# Patient Record
Sex: FEMALE | Race: WHITE | NOT HISPANIC OR LATINO | Employment: FULL TIME | URBAN - METROPOLITAN AREA
[De-identification: names, ages, dates, MRNs, and addresses within clinical notes are randomized per-mention and may not be internally consistent; named-entity substitution may affect disease eponyms.]

---

## 2017-05-23 ENCOUNTER — TELEPHONE (OUTPATIENT)
Dept: BARIATRICS | Facility: CLINIC | Age: 37
End: 2017-05-23

## 2017-05-23 NOTE — TELEPHONE ENCOUNTER
----- Message from Mago George sent at 5/22/2017 12:37 PM CDT -----  Contact: self   Patient wants to speak with a nurse regarding her insurance over lap band please call back at [phone]

## 2018-02-01 RX ORDER — DESOGESTREL AND ETHINYL ESTRADIOL 0.15-0.03
KIT ORAL
Refills: 8 | COMMUNITY
Start: 2018-01-15 | End: 2018-05-29

## 2018-02-01 RX ORDER — PHENTERMINE HYDROCHLORIDE 37.5 MG/1
CAPSULE ORAL
Refills: 0 | COMMUNITY
Start: 2018-01-16 | End: 2018-04-22

## 2018-02-07 RX ORDER — PHENTERMINE HYDROCHLORIDE 37.5 MG/1
37.5 CAPSULE ORAL EVERY MORNING
Qty: 30 CAPSULE | Refills: 0 | OUTPATIENT
Start: 2018-02-07 | End: 2019-02-07

## 2018-02-07 NOTE — TELEPHONE ENCOUNTER
----- Message from Kaylin Richards sent at 2/1/2018  8:41 AM CST -----  Contact: Mirlande Pike needs a refill of phentermine capsules sent to [facility] on [facility]. She is also requesting the results of her sleep study and the info on where to  the equipment.   Pt can be contacted at [phone]

## 2018-03-13 ENCOUNTER — TELEPHONE (OUTPATIENT)
Dept: INTERNAL MEDICINE | Facility: CLINIC | Age: 38
End: 2018-03-13

## 2018-04-22 ENCOUNTER — HOSPITAL ENCOUNTER (EMERGENCY)
Facility: HOSPITAL | Age: 38
Discharge: HOME | End: 2018-04-22
Attending: EMERGENCY MEDICINE
Payer: COMMERCIAL

## 2018-04-22 VITALS
HEIGHT: 63 IN | TEMPERATURE: 98 F | SYSTOLIC BLOOD PRESSURE: 144 MMHG | DIASTOLIC BLOOD PRESSURE: 85 MMHG | OXYGEN SATURATION: 91 % | WEIGHT: 293 LBS | HEART RATE: 71 BPM | BODY MASS INDEX: 51.91 KG/M2 | RESPIRATION RATE: 20 BRPM

## 2018-04-22 DIAGNOSIS — R51.9 HEADACHE: ICD-10-CM

## 2018-04-22 DIAGNOSIS — I10 ACCELERATED HYPERTENSION: Primary | ICD-10-CM

## 2018-04-22 LAB
ANION GAP SERPL CALC-SCNC: 9 MMOL/L
B-HCG UR QL: NEGATIVE
BASOPHILS # BLD AUTO: 0 K/UL
BASOPHILS NFR BLD: 0.1 %
BUN SERPL-MCNC: 10 MG/DL
CALCIUM SERPL-MCNC: 10 MG/DL
CHLORIDE SERPL-SCNC: 103 MMOL/L
CO2 SERPL-SCNC: 26 MMOL/L
CREAT SERPL-MCNC: 0.9 MG/DL
CTP QC/QA: YES
DIFFERENTIAL METHOD: ABNORMAL
EOSINOPHIL # BLD AUTO: 0.2 K/UL
EOSINOPHIL NFR BLD: 1.6 %
ERYTHROCYTE [DISTWIDTH] IN BLOOD BY AUTOMATED COUNT: 18.6 %
EST. GFR  (AFRICAN AMERICAN): >60 ML/MIN/1.73 M^2
EST. GFR  (NON AFRICAN AMERICAN): >60 ML/MIN/1.73 M^2
GLUCOSE SERPL-MCNC: 103 MG/DL
HCT VFR BLD AUTO: 36.3 %
HGB BLD-MCNC: 11.3 G/DL
LYMPHOCYTES # BLD AUTO: 2.7 K/UL
LYMPHOCYTES NFR BLD: 25 %
MCH RBC QN AUTO: 22.6 PG
MCHC RBC AUTO-ENTMCNC: 31.2 G/DL
MCV RBC AUTO: 72 FL
MONOCYTES # BLD AUTO: 0.7 K/UL
MONOCYTES NFR BLD: 6.7 %
NEUTROPHILS # BLD AUTO: 7.3 K/UL
NEUTROPHILS NFR BLD: 66.6 %
PLATELET # BLD AUTO: 366 K/UL
PMV BLD AUTO: 9.5 FL
POTASSIUM SERPL-SCNC: 4.1 MMOL/L
RBC # BLD AUTO: 5.02 M/UL
SODIUM SERPL-SCNC: 138 MMOL/L
WBC # BLD AUTO: 10.9 K/UL

## 2018-04-22 PROCEDURE — 85025 COMPLETE CBC W/AUTO DIFF WBC: CPT

## 2018-04-22 PROCEDURE — 63600175 PHARM REV CODE 636 W HCPCS: Performed by: EMERGENCY MEDICINE

## 2018-04-22 PROCEDURE — 36415 COLL VENOUS BLD VENIPUNCTURE: CPT

## 2018-04-22 PROCEDURE — 80048 BASIC METABOLIC PNL TOTAL CA: CPT

## 2018-04-22 PROCEDURE — 96374 THER/PROPH/DIAG INJ IV PUSH: CPT

## 2018-04-22 PROCEDURE — 96375 TX/PRO/DX INJ NEW DRUG ADDON: CPT

## 2018-04-22 PROCEDURE — 81025 URINE PREGNANCY TEST: CPT | Performed by: EMERGENCY MEDICINE

## 2018-04-22 PROCEDURE — 99284 EMERGENCY DEPT VISIT MOD MDM: CPT | Mod: 25

## 2018-04-22 PROCEDURE — 25000003 PHARM REV CODE 250: Performed by: EMERGENCY MEDICINE

## 2018-04-22 RX ORDER — KETOROLAC TROMETHAMINE 30 MG/ML
30 INJECTION, SOLUTION INTRAMUSCULAR; INTRAVENOUS
Status: COMPLETED | OUTPATIENT
Start: 2018-04-22 | End: 2018-04-22

## 2018-04-22 RX ORDER — ATENOLOL 25 MG/1
25 TABLET ORAL DAILY
Qty: 30 TABLET | Refills: 0 | Status: SHIPPED | OUTPATIENT
Start: 2018-04-22 | End: 2019-03-25

## 2018-04-22 RX ORDER — METOCLOPRAMIDE HYDROCHLORIDE 5 MG/ML
10 INJECTION INTRAMUSCULAR; INTRAVENOUS
Status: COMPLETED | OUTPATIENT
Start: 2018-04-22 | End: 2018-04-22

## 2018-04-22 RX ORDER — BUTALBITAL, ACETAMINOPHEN AND CAFFEINE 50; 325; 40 MG/1; MG/1; MG/1
1 TABLET ORAL EVERY 4 HOURS PRN
Qty: 15 TABLET | Refills: 0 | Status: SHIPPED | OUTPATIENT
Start: 2018-04-22 | End: 2018-05-22

## 2018-04-22 RX ORDER — LABETALOL HYDROCHLORIDE 5 MG/ML
20 INJECTION, SOLUTION INTRAVENOUS
Status: COMPLETED | OUTPATIENT
Start: 2018-04-22 | End: 2018-04-22

## 2018-04-22 RX ADMIN — KETOROLAC TROMETHAMINE 30 MG: 30 INJECTION, SOLUTION INTRAMUSCULAR at 06:04

## 2018-04-22 RX ADMIN — LABETALOL HYDROCHLORIDE 20 MG: 5 INJECTION, SOLUTION INTRAVENOUS at 06:04

## 2018-04-22 RX ADMIN — METOCLOPRAMIDE 10 MG: 5 INJECTION, SOLUTION INTRAMUSCULAR; INTRAVENOUS at 06:04

## 2018-04-22 NOTE — ED PROVIDER NOTES
Encounter Date: 2018    SCRIBE #1 NOTE: [place], am scribing for, and in the presence of, Dr. Saldaña.       History     Chief Complaint   Patient presents with    Headache     pt referred to ED from urgent care with headache since friday and HTN    Hypertension       2018 5:55 PM     Chief complaint: Headache      Mirlande Boogie is a 37 y.o. female with a past medical history of hypothyroidism, HTN, and anemia presenting to the ED with a gradual onset of an acute headache beginning 2 days ago. The pt reported current were similar to sinus congestion. She stated she initially went to urgent care for symptoms and was advised to come to ED for evaluation of kidney complications. Associated symptoms of nausea, photophobia, and eye pain. The pt denied fever, vomiting, and neck stiffness. The pt reported she has previously taken diet pills but has discontinued 1 month ago. The pt denied history of smoking.        The history is provided by the patient.     Review of patient's allergies indicates:   Allergen Reactions    Anesthesia s/i-40 [propofol]     Codeine      Past Medical History:   Diagnosis Date    Anemia     Hypothyroidism      Past Surgical History:   Procedure Laterality Date    BREAST SURGERY       SECTION       Family History   Problem Relation Age of Onset    Pulmonary fibrosis Mother      Social History   Substance Use Topics    Smoking status: Never Smoker    Smokeless tobacco: Never Used    Alcohol use No     Review of Systems   Constitutional: Negative for activity change, appetite change, chills, fatigue and fever.   Eyes: Positive for photophobia and pain. Negative for visual disturbance.   Respiratory: Negative for apnea and shortness of breath.    Cardiovascular: Negative for chest pain and palpitations.   Gastrointestinal: Positive for nausea. Negative for abdominal distention, abdominal pain and vomiting.   Genitourinary: Negative for difficulty urinating.    Musculoskeletal: Negative for neck stiffness.   Skin: Negative for pallor and rash.   Neurological: Positive for headaches.   Hematological: Does not bruise/bleed easily.   Psychiatric/Behavioral: Negative for agitation.       Physical Exam     Initial Vitals [04/22/18 1625]   BP Pulse Resp Temp SpO2   (!) 175/104 91 (!) 99 98.4 °F (36.9 °C) 98 %      MAP       127.67         Physical Exam    Nursing note and vitals reviewed.  Constitutional: She appears well-developed and well-nourished.   HENT:   Head: Normocephalic and atraumatic.   Eyes: Conjunctivae and EOM are normal. Pupils are equal, round, and reactive to light.   Neck: Normal range of motion. Neck supple.   Cardiovascular: Normal rate, regular rhythm and normal heart sounds. Exam reveals no gallop and no friction rub.    No murmur heard.  Pulmonary/Chest: Effort normal and breath sounds normal. No respiratory distress. She has no wheezes. She has no rhonchi. She has no rales.   Abdominal: Soft. She exhibits no distension. There is no tenderness.   Musculoskeletal: Normal range of motion.   Neurological: She is alert and oriented to person, place, and time. She has normal strength.   Skin: Skin is warm and dry. No erythema.   Psychiatric: She has a normal mood and affect.         ED Course   Procedures  Labs Reviewed - No data to display          Medical Decision Making:   History:   Old Medical Records: I decided to obtain old medical records.  Clinical Tests:   Lab Tests: Ordered and Reviewed  Radiological Study: Ordered and Reviewed  ED Management:  Mirlande Boogie is a 37 y.o. female who presents with  a global headache.  She denies any history of hypertension but is currently hypertensive.  She is given labetalol with resolution of her hypertension and headache.  Headache most likely is secondary to accelerated hypertension.  CT of the head is normal with no evidence of intracranial hemorrhage.    Imaging Results          CT Head Without Contrast (In  process)                         Scribe Attestation:   Scribe #1: I performed the above scribed service and the documentation accurately describes the services I performed. I attest to the accuracy of the note.    I, Dr. Amilcar Saldaña III, personally performed the services described in this documentation. All medical record entries made by the scribe were at my direction and in my presence.  I have reviewed the chart and agree that the record reflects my personal performance and is accurate and complete. Amilcar Saldaña III, MD.  7:32 PM 04/22/2018           Clinical Impression:   The primary encounter diagnosis was Accelerated hypertension. A diagnosis of Headache was also pertinent to this visit.                           Amilcar Saldaña III, MD  04/22/18 1933

## 2018-05-06 PROBLEM — F32.9 DEPRESSION, REACTIVE: Status: ACTIVE | Noted: 2018-05-06

## 2018-05-06 PROBLEM — E78.2 MIXED HYPERLIPIDEMIA: Status: ACTIVE | Noted: 2018-05-06

## 2018-05-06 PROBLEM — E66.01 MORBID OBESITY: Status: ACTIVE | Noted: 2018-05-06

## 2018-05-24 ENCOUNTER — TELEPHONE (OUTPATIENT)
Dept: INTERNAL MEDICINE | Facility: CLINIC | Age: 38
End: 2018-05-24

## 2018-05-24 NOTE — TELEPHONE ENCOUNTER
----- Message from Kaylin Richards sent at 5/24/2018  1:38 PM CDT -----  Contact: Mirlande Pike is trying to find out about a cpap machine. Please call her

## 2018-05-24 NOTE — TELEPHONE ENCOUNTER
SLEEP HOME STUDY  ASKING ABOUT A C-PAP IT IS IN MEDIA  ON 1/3/18 HAS AN APPT. WITH YOLANDA TO GO OVER SLEEP STUDY

## 2018-05-29 ENCOUNTER — OFFICE VISIT (OUTPATIENT)
Dept: INTERNAL MEDICINE | Facility: CLINIC | Age: 38
End: 2018-05-29
Payer: COMMERCIAL

## 2018-05-29 VITALS
WEIGHT: 293 LBS | BODY MASS INDEX: 51.91 KG/M2 | HEART RATE: 111 BPM | HEIGHT: 63 IN | RESPIRATION RATE: 19 BRPM | DIASTOLIC BLOOD PRESSURE: 96 MMHG | TEMPERATURE: 98 F | OXYGEN SATURATION: 99 % | SYSTOLIC BLOOD PRESSURE: 136 MMHG

## 2018-05-29 DIAGNOSIS — G47.33 OSA (OBSTRUCTIVE SLEEP APNEA): Primary | ICD-10-CM

## 2018-05-29 DIAGNOSIS — Z23 NEED FOR TDAP VACCINATION: ICD-10-CM

## 2018-05-29 DIAGNOSIS — E66.01 MORBID OBESITY: ICD-10-CM

## 2018-05-29 DIAGNOSIS — R03.0 ELEVATED BP WITHOUT DIAGNOSIS OF HYPERTENSION: ICD-10-CM

## 2018-05-29 PROCEDURE — 3080F DIAST BP >= 90 MM HG: CPT | Mod: ,,, | Performed by: NURSE PRACTITIONER

## 2018-05-29 PROCEDURE — 3075F SYST BP GE 130 - 139MM HG: CPT | Mod: ,,, | Performed by: NURSE PRACTITIONER

## 2018-05-29 PROCEDURE — 3008F BODY MASS INDEX DOCD: CPT | Mod: ,,, | Performed by: NURSE PRACTITIONER

## 2018-05-29 PROCEDURE — 99214 OFFICE O/P EST MOD 30 MIN: CPT | Mod: ,,, | Performed by: NURSE PRACTITIONER

## 2018-05-29 RX ORDER — AMLODIPINE BESYLATE 5 MG/1
5 TABLET ORAL DAILY
Qty: 30 TABLET | Refills: 5 | Status: SHIPPED | OUTPATIENT
Start: 2018-05-29 | End: 2019-04-10 | Stop reason: SDUPTHER

## 2018-05-29 RX ORDER — PHENTERMINE HYDROCHLORIDE 37.5 MG/1
37.5 CAPSULE ORAL EVERY MORNING
Qty: 30 CAPSULE | Refills: 0 | Status: CANCELLED | OUTPATIENT
Start: 2018-05-29 | End: 2018-06-28

## 2018-05-29 NOTE — PROGRESS NOTES
"    SUBJECTIVE:      Patient ID: Mirlande Boogie is a 37 y.o. female.    Chief Complaint: Follow-up (adipex 37.5 capsule was working well, has regained weight); Apnea (review sleep study); and Hypertension (has been prescribed atenolol 25, taking about 1xweek when BP elevated)    Last visit 11/2017, no labs on file since 4/2016, has had trouble making it in to see us, [facility] ED for uncontrolled HTN 4/22, states she was still feeling bad in May when she no-showed here    States since being off adipex has put "all the weight I lost back on", states she was down to 315 last year, feels her periods have been affecting her weight & BP, notices HA when her BP is elevated, admits to not taking atenolol regularly as it makes her sleepy, just started med after 4/22 ED visit, denies HO HTN, thinks she retains a lot more water than an average person during her periods, was told to take OTC diuretic for "swelling" which has helped some, BP elevated today - states she ate [employer] sandwich for lunch & was running late    C/o not hearing back re: sleep study, needs order for CPAP, diagnosed with ASAEL in January, wants to get her machine as she thinks not sleeping has contributed to her weight gain, discussed outstanding health maintenance & needed labs, talked about proper diet & exercise for weight loss         Hypertension   This is a new problem. The current episode started more than 1 month ago. The problem has been gradually improving since onset. Associated symptoms include headaches. Pertinent negatives include no anxiety, blurred vision, chest pain, malaise/fatigue, neck pain, orthopnea, palpitations, peripheral edema, PND, shortness of breath or sweats. There are no associated agents to hypertension. Risk factors for coronary artery disease include obesity, stress and sedentary lifestyle. Past treatments include beta blockers. The current treatment provides mild improvement. Compliance problems include exercise, " diet and medication side effects.  Identifiable causes of hypertension include sleep apnea.       Past Surgical History:   Procedure Laterality Date    BREAST SURGERY       SECTION      WISDOM TOOTH EXTRACTION      all 4 removed     Family History   Problem Relation Age of Onset    Pulmonary fibrosis Mother     Early death Father 64      Social History     Social History    Marital status: Single     Spouse name: N/A    Number of children: N/A    Years of education: N/A     Social History Main Topics    Smoking status: Never Smoker    Smokeless tobacco: Never Used    Alcohol use No    Drug use: No    Sexual activity: No     Other Topics Concern    None     Social History Narrative    None     Current Outpatient Prescriptions   Medication Sig Dispense Refill    atenolol (TENORMIN) 25 MG tablet Take 1 tablet (25 mg total) by mouth once daily. 30 tablet 0    pamabrom 50 mg Tab Take 1 tablet by mouth daily as needed.      amLODIPine (NORVASC) 5 MG tablet Take 1 tablet (5 mg total) by mouth once daily. 30 tablet 5     No current facility-administered medications for this visit.      Review of patient's allergies indicates:   Allergen Reactions    Anesthesia s/i-40 [propofol]     Codeine       Past Medical History:   Diagnosis Date    Anemia     Back pain     CHO intolerance     Excessive daytime sleepiness     Hypothyroidism     Hypothyroidism     Ventral hernia      Past Surgical History:   Procedure Laterality Date    BREAST SURGERY       SECTION      WISDOM TOOTH EXTRACTION      all 4 removed       Review of Systems   Constitutional: Negative for activity change, appetite change, fatigue, malaise/fatigue and unexpected weight change.   HENT: Negative for congestion, ear pain, hearing loss, postnasal drip, sinus pain, sinus pressure, sneezing and sore throat.    Eyes: Negative for blurred vision, photophobia and pain.   Respiratory: Negative for cough, chest tightness,  "shortness of breath and wheezing.    Cardiovascular: Negative for chest pain, palpitations, orthopnea, leg swelling and PND.   Gastrointestinal: Positive for abdominal distention ("bloating") and nausea. Negative for abdominal pain, blood in stool, constipation, diarrhea and vomiting.   Endocrine: Negative for cold intolerance, heat intolerance, polydipsia and polyuria.   Genitourinary: Negative for difficulty urinating, dysuria, flank pain, frequency, hematuria, pelvic pain and urgency.   Musculoskeletal: Negative for arthralgias, back pain, joint swelling, myalgias and neck pain.   Skin: Negative for pallor and rash.   Allergic/Immunologic: Negative for environmental allergies and food allergies.   Neurological: Positive for headaches. Negative for dizziness, weakness, light-headedness and numbness.   Hematological: Does not bruise/bleed easily.   Psychiatric/Behavioral: Negative for agitation, confusion, decreased concentration and sleep disturbance. The patient is not nervous/anxious.       OBJECTIVE:      Vitals:    05/29/18 1359 05/29/18 1407 05/29/18 1457   BP: (!) 146/98 (!) 140/96 (!) 136/96   Pulse: (!) 111     Resp: 19     Temp: 98.2 °F (36.8 °C)     SpO2: 99%     Weight: (!) 160.8 kg (354 lb 9.6 oz)     Height: 5' 3" (1.6 m)       Physical Exam   Constitutional: She is oriented to person, place, and time. She appears well-developed and well-nourished. No distress.   Morbidly obese   HENT:   Head: Normocephalic and atraumatic.   Right Ear: Hearing normal.   Left Ear: Hearing normal.   Nose: Nose normal. No rhinorrhea.   Mouth/Throat: Mucous membranes are normal.   Eyes: Conjunctivae and lids are normal. Pupils are equal, round, and reactive to light. Right eye exhibits no discharge. Left eye exhibits no discharge. Right conjunctiva is not injected. Left conjunctiva is not injected. Right pupil is round and reactive. Left pupil is round and reactive. Pupils are equal.   Neck: Trachea normal and normal " range of motion. Neck supple. No JVD present. No tracheal deviation present. No thyromegaly present.   Cardiovascular: Normal rate, regular rhythm, normal heart sounds and intact distal pulses.  Exam reveals no gallop and no friction rub.    No murmur heard.  Pulses:       Radial pulses are 2+ on the right side, and 2+ on the left side.   Pulmonary/Chest: Effort normal and breath sounds normal. No stridor. No respiratory distress. She has no decreased breath sounds. She has no wheezes. She has no rhonchi. She has no rales.   Abdominal: Soft. Bowel sounds are normal. She exhibits no distension. There is no tenderness. There is no rigidity and no guarding.   Musculoskeletal: Normal range of motion. She exhibits no edema.   Lymphadenopathy:     She has no cervical adenopathy.   Neurological: She is alert and oriented to person, place, and time. She has normal strength. She displays no atrophy. She displays a negative Romberg sign. Coordination and gait normal.   Skin: Skin is warm and dry. Capillary refill takes less than 2 seconds. No lesion and no rash noted. No cyanosis. No pallor.   Psychiatric: She has a normal mood and affect. Her speech is normal and behavior is normal. Judgment and thought content normal. Cognition and memory are normal. She is attentive.   Nursing note and vitals reviewed.     Assessment:       1. ASAEL (obstructive sleep apnea)    2. Elevated BP without diagnosis of hypertension    3. Morbid obesity    4. Need for Tdap vaccination        Plan:       ASAEL (obstructive sleep apnea)  -     CPAP titration (Must have diagnosis of ASAEL from previous sleep study.); Future    Elevated BP without diagnosis of hypertension  -     amLODIPine (NORVASC) 5 MG tablet; Take 1 tablet (5 mg total) by mouth once daily.  Dispense: 30 tablet; Refill: 5  -     Lipid panel; Future; Expected date: 05/29/2018    Morbid obesity   -The patient is asked to make an attempt to improve diet and exercise patterns to aid in  medical management of this problem.  -     phentermine 37.5 MG capsule; Take 1 capsule (37.5 mg total) by mouth every morning.  Dispense: 30 capsule; Refill: 0 (prescribed by Dr. Rainey)    Need for Tdap vaccination  -     DIPH,PERTUSS,ACEL,,TET VAC,PF, ADULT (ADACEL) 2 Lf-(2.5-5-3-5 mcg)-5Lf/0.5 mL Syrg; Inject 0.5 mLs into the muscle once.  Dispense: 0.5 mL; Refill: 0      Follow-up in about 4 weeks (around 6/26/2018) for weight & BP check.      5/31/2018 VICKIE Bruno, FNP-C

## 2018-05-29 NOTE — PATIENT INSTRUCTIONS
"  Understanding Body Mass Index (BMI)  Body mass index (BMI) is a method of screening for a weight category using the ratio of your height to your weight. The BMI is a measure of overweight that is corrected for height. Knowing your BMI is a way to tell if you are at a healthy weight. The higher your BMI, the greater your risk for weight-related health problems.  What BMI means  · BMI below 18.5: Underweight  · BMI 18.5 to 24.9: Healthy weight or "ideal body weight"   · BMI 25 to 29.9: Overweight  · BMI 30 and over: Obese  · BMI 40 and over: Severe obesity   Online BMI Calculators  Find your BMI with an online BMI calculator tool, such as these from the CDC:  · BMI calculator for adults  · BMI calculator for children and teens   Using the BMI chart  To figure out your BMI, find your height and weight (or the numbers closest to them) on the table below. Follow each column of numbers to where your height and weight meet on the table. That is your BMI.    Date Last Reviewed: 7/1/2016 © 2000-2017 [employer]. [address] [place] [address]. All rights reserved. This information is not intended as a substitute for professional medical care. Always follow your healthcare professional's instructions.      ++++Almased weight loss powder - can use as meal replacement breakfast & lunch (follow directions on can). Garcinia cambogia 95% pure 1 capsule in the AM. Mother's vinegar ([employer] [place]) 1 tsp in 8 oz warm water every AM.++++    "

## 2018-05-31 ENCOUNTER — TELEPHONE (OUTPATIENT)
Dept: INTERNAL MEDICINE | Facility: CLINIC | Age: 38
End: 2018-05-31

## 2018-05-31 DIAGNOSIS — R63.5 WEIGHT GAIN: Primary | ICD-10-CM

## 2018-05-31 NOTE — TELEPHONE ENCOUNTER
Patient stated she is waiting for a doctor to sign off on her phentemine 37.5mg I told the patient is would send to the pcp and let her know.

## 2018-06-04 DIAGNOSIS — E66.01 MORBID OBESITY: Primary | ICD-10-CM

## 2018-06-04 RX ORDER — PHENTERMINE HYDROCHLORIDE 37.5 MG/1
37.5 CAPSULE ORAL EVERY MORNING
Qty: 30 CAPSULE | Refills: 0 | Status: SHIPPED | OUTPATIENT
Start: 2018-06-04 | End: 2018-07-04

## 2018-06-04 RX ORDER — PHENTERMINE HYDROCHLORIDE 37.5 MG/1
37.5 CAPSULE ORAL EVERY MORNING
Qty: 30 CAPSULE | Refills: 0 | Status: CANCELLED | OUTPATIENT
Start: 2018-06-04 | End: 2018-07-04

## 2019-03-25 ENCOUNTER — OFFICE VISIT (OUTPATIENT)
Dept: FAMILY MEDICINE | Facility: CLINIC | Age: 39
End: 2019-03-25
Payer: COMMERCIAL

## 2019-03-25 VITALS
WEIGHT: 293 LBS | OXYGEN SATURATION: 98 % | BODY MASS INDEX: 51.91 KG/M2 | SYSTOLIC BLOOD PRESSURE: 138 MMHG | HEART RATE: 92 BPM | DIASTOLIC BLOOD PRESSURE: 98 MMHG | TEMPERATURE: 99 F | HEIGHT: 63 IN

## 2019-03-25 DIAGNOSIS — G47.33 OBSTRUCTIVE SLEEP APNEA: ICD-10-CM

## 2019-03-25 DIAGNOSIS — E66.01 MORBID OBESITY: ICD-10-CM

## 2019-03-25 DIAGNOSIS — R03.0 ELEVATED BP WITHOUT DIAGNOSIS OF HYPERTENSION: Primary | ICD-10-CM

## 2019-03-25 DIAGNOSIS — E78.2 MIXED HYPERLIPIDEMIA: ICD-10-CM

## 2019-03-25 PROCEDURE — 99214 PR OFFICE/OUTPT VISIT, EST, LEVL IV, 30-39 MIN: ICD-10-PCS | Mod: ,,, | Performed by: NURSE PRACTITIONER

## 2019-03-25 PROCEDURE — 3075F SYST BP GE 130 - 139MM HG: CPT | Mod: ,,, | Performed by: NURSE PRACTITIONER

## 2019-03-25 PROCEDURE — 3080F DIAST BP >= 90 MM HG: CPT | Mod: ,,, | Performed by: NURSE PRACTITIONER

## 2019-03-25 PROCEDURE — 3008F BODY MASS INDEX DOCD: CPT | Mod: ,,, | Performed by: NURSE PRACTITIONER

## 2019-03-25 PROCEDURE — 3075F PR MOST RECENT SYSTOLIC BLOOD PRESS GE 130-139MM HG: ICD-10-PCS | Mod: ,,, | Performed by: NURSE PRACTITIONER

## 2019-03-25 PROCEDURE — 99214 OFFICE O/P EST MOD 30 MIN: CPT | Mod: ,,, | Performed by: NURSE PRACTITIONER

## 2019-03-25 PROCEDURE — 3080F PR MOST RECENT DIASTOLIC BLOOD PRESSURE >= 90 MM HG: ICD-10-PCS | Mod: ,,, | Performed by: NURSE PRACTITIONER

## 2019-03-25 PROCEDURE — 3008F PR BODY MASS INDEX (BMI) DOCUMENTED: ICD-10-PCS | Mod: ,,, | Performed by: NURSE PRACTITIONER

## 2019-03-25 RX ORDER — PHENTERMINE HYDROCHLORIDE 37.5 MG/1
37.5 CAPSULE ORAL DAILY
Refills: 0 | COMMUNITY
Start: 2018-12-21 | End: 2019-04-10 | Stop reason: SDUPTHER

## 2019-03-25 NOTE — PATIENT INSTRUCTIONS
4 Steps for Eating Healthier  Changing the way you eat can improve your health. It can lower your cholesterol and blood pressure, and help you stay at a healthy weight. Your diet doesnt have to be bland and boring to be healthy. Just watch your calories and follow these steps:    1. Eat fewer unhealthy fats  · Choose more fish and lean meats instead of fatty cuts of meat.  · Skip butter and lard, and use less margarine.  · Pass on foods that have palm, coconut, or hydrogenated oils.  · Eat fewer high-fat dairy foods like cheese, ice cream, and whole milk.  · Get a heart-healthy cookbook and try some low-fat recipes.  2. Go light on salt  · Keep the saltshaker off the table.  · Limit high-salt ingredients, such as soy sauce, bouillon, and garlic salt.  · Instead of adding salt when cooking, season your food with herbs and flavorings. Try lemon, garlic, and onion.  · Limit convenience foods, such as boxed or canned foods and restaurant food.  · Read food labels and choose lower-sodium options.  3. Limit sugar  · Pause before you add sugars to pancakes, cereal, coffee, or tea. This includes white and brown table sugar, syrup, honey, and molasses. Cut your usual amount by half.  · Use non-sugar sweeteners. Stevia, aspartame, and sucralose can satisfy a sweet tooth without adding calories.  · Swap out sugar-filled soda and other drinks. Buy sugar-free or low-calorie beverages. Remember water is always the best choice.  · Read labels and choose foods with less added sugar. Keep in mind that dairy foods and foods with fruit will have some natural sugar.  · Cut the sugar in recipes by 1/3 to 1/2. Boost the flavor with extracts like almond, vanilla, or orange. Or add spices such as cinnamon or nutmeg.  4. Eat more fiber  · Eat fresh fruits and vegetables every day.  · Boost your diet with whole grains. Go for oats, whole-grain rice, and bran.  · Add beans and lentils to your meals.  · Drink more water to match your fiber  increase. This is to help prevent constipation.  Date Last Reviewed: 5/11/2015  © 6856-9387 [employer]. [address] [place] [address], [place] [address]. All rights reserved. This information is not intended as a substitute for professional medical care. Always follow your healthcare professional's instructions.

## 2019-03-25 NOTE — PROGRESS NOTES
"    SUBJECTIVE:      Patient ID: Mirlande Boogie is a 38 y.o. female.    Chief Complaint: No chief complaint on file.    Last seen in this office 2018 - didn't have labs done from that visit, here for phentermine refills, states she is seeing Dr. Nathan (bariatric surgery), was told she would have difficulty getting insurance to approve lap band or balloon as she doesn't have any "diagnosed" health issues, doesn't want to be cut on for sleeve or bypass, asking for pictures to be taken of her abd area for insurance approval, told her that request should be discussed with surgeon, she also states she had EGD last Thursday, states her ASAEL is caused by her tongue relaxing to the back of the throat, was unable to afford CPAP machine but is interested in getting it now although she states she is not happy about having a mask on her face  Hypertension   This is a chronic problem. The current episode started more than 1 month ago. The problem is unchanged. Pertinent negatives include no chest pain, neck pain, palpitations or shortness of breath. Agents associated with hypertension include amphetamines. Risk factors for coronary artery disease include dyslipidemia, obesity and sedentary lifestyle. Past treatments include calcium channel blockers. Compliance problems include diet and exercise.  Identifiable causes of hypertension include sleep apnea.       Past Surgical History:   Procedure Laterality Date    BREAST SURGERY       SECTION      WISDOM TOOTH EXTRACTION      all 4 removed     Family History   Problem Relation Age of Onset    Pulmonary fibrosis Mother     Early death Father 64      Social History     Socioeconomic History    Marital status:      Spouse name: None    Number of children: None    Years of education: None    Highest education level: None   Occupational History    None   Social Needs    Financial resource strain: None    Food insecurity:     Worry: None     Inability: None "    Transportation needs:     Medical: None     Non-medical: None   Tobacco Use    Smoking status: Never Smoker    Smokeless tobacco: Never Used   Substance and Sexual Activity    Alcohol use: No    Drug use: No    Sexual activity: Never   Lifestyle    Physical activity:     Days per week: None     Minutes per session: None    Stress: None   Relationships    Social connections:     Talks on phone: None     Gets together: None     Attends [employer] service: None     Active member of club or organization: None     Attends meetings of clubs or organizations: None     Relationship status: None    Intimate partner violence:     Fear of current or ex partner: None     Emotionally abused: None     Physically abused: None     Forced sexual activity: None   Other Topics Concern    None   Social History Narrative    None     Current Outpatient Medications   Medication Sig Dispense Refill    amLODIPine (NORVASC) 5 MG tablet Take 1 tablet (5 mg total) by mouth once daily. 30 tablet 5    phentermine 37.5 MG capsule Take 37.5 mg by mouth once daily.  0     No current facility-administered medications for this visit.      Review of patient's allergies indicates:   Allergen Reactions    Anesthesia s/i-40 [propofol]     Codeine       Past Medical History:   Diagnosis Date    Anemia     Back pain     CHO intolerance     Excessive daytime sleepiness     Hypothyroidism     Hypothyroidism     Obstructive sleep apnea 2019    Ventral hernia      Past Surgical History:   Procedure Laterality Date    BREAST SURGERY       SECTION      WISDOM TOOTH EXTRACTION      all 4 removed       Review of Systems   Constitutional: Negative for activity change, appetite change, fatigue and unexpected weight change.   HENT: Negative for congestion, ear pain, hearing loss, postnasal drip, sinus pressure, sinus pain, sneezing and sore throat.    Eyes: Negative for photophobia and pain.   Respiratory: Negative for cough,  "chest tightness, shortness of breath and wheezing.    Cardiovascular: Negative for chest pain, palpitations and leg swelling.   Gastrointestinal: Negative for abdominal distention, abdominal pain, blood in stool, constipation, diarrhea, nausea and vomiting.   Endocrine: Negative for cold intolerance, heat intolerance, polydipsia and polyuria.   Genitourinary: Negative for difficulty urinating, dysuria, flank pain, frequency, hematuria, pelvic pain and urgency.   Musculoskeletal: Negative for arthralgias, back pain, joint swelling, myalgias and neck pain.   Skin: Negative for pallor.   Allergic/Immunologic: Negative for environmental allergies and food allergies.   Neurological: Negative for dizziness, weakness, light-headedness and numbness.   Hematological: Does not bruise/bleed easily.   Psychiatric/Behavioral: Negative for agitation, confusion, decreased concentration and sleep disturbance. The patient is not nervous/anxious.       OBJECTIVE:      Vitals:    03/25/19 0822 03/25/19 0828   BP: (!) 140/104 (!) 138/98   Pulse: 92    Temp: 98.6 °F (37 °C)    SpO2: 98%    Weight: (!) 163.4 kg (360 lb 3.2 oz)    Height: 5' 3" (1.6 m)      Physical Exam   Constitutional: She is oriented to person, place, and time. She appears well-developed and well-nourished. No distress.   Morbidly obese   HENT:   Head: Normocephalic and atraumatic.   Right Ear: Hearing normal.   Left Ear: Hearing normal.   Nose: Nose normal. No rhinorrhea.   Mouth/Throat: Mucous membranes are normal.   Eyes: Pupils are equal, round, and reactive to light. Conjunctivae and lids are normal. Right eye exhibits no discharge. Left eye exhibits no discharge. Right conjunctiva is not injected. Left conjunctiva is not injected. Right pupil is round and reactive. Left pupil is round and reactive. Pupils are equal.   Neck: Trachea normal and normal range of motion. Neck supple. No JVD present. No tracheal deviation present. No thyromegaly present. "   Cardiovascular: Normal rate, regular rhythm, normal heart sounds and intact distal pulses. Exam reveals no gallop and no friction rub.   No murmur heard.  Pulses:       Radial pulses are 2+ on the right side, and 2+ on the left side.   Pulmonary/Chest: Effort normal and breath sounds normal. No stridor. No respiratory distress. She has no decreased breath sounds. She has no wheezes. She has no rhonchi. She has no rales.   Abdominal: Soft. Bowel sounds are normal. She exhibits no distension. There is no tenderness. There is no rigidity and no guarding.   Musculoskeletal: Normal range of motion. She exhibits no edema.   Lymphadenopathy:     She has no cervical adenopathy.   Neurological: She is alert and oriented to person, place, and time. She has normal strength. She displays no atrophy. She displays a negative Romberg sign. Coordination and gait normal.   Skin: Skin is warm and dry. Capillary refill takes less than 2 seconds. No lesion and no rash noted. No cyanosis. No pallor.   Psychiatric: She has a normal mood and affect. Her speech is normal and behavior is normal. Judgment and thought content normal. Cognition and memory are normal. She is attentive.   Nursing note and vitals reviewed.     Assessment:       1. Elevated BP without diagnosis of hypertension    2. Mixed hyperlipidemia    3. Morbid obesity    4. Obstructive sleep apnea        Plan:       Mixed hyperlipidemia  -     Lipid panel; Future; Expected date: 03/25/2019    Elevated BP without diagnosis of hypertension  -     CBC auto differential; Future; Expected date: 03/25/2019  -     Comprehensive metabolic panel; Future; Expected date: 03/25/2019    Morbid obesity   - The patient is asked to make an attempt to improve diet and exercise patterns to aid in medical management of this problem.    Obstructive sleep apnea   - interested in getting a CPAP now        Follow up in about 2 weeks (around 4/8/2019) for lab review & weight loss refills with   Redd.      3/25/2019 VICKIE Bruno, FNP-C

## 2019-03-29 ENCOUNTER — TELEPHONE (OUTPATIENT)
Dept: FAMILY MEDICINE | Facility: CLINIC | Age: 39
End: 2019-03-29

## 2019-03-29 DIAGNOSIS — G47.33 OBSTRUCTIVE SLEEP APNEA: Primary | ICD-10-CM

## 2019-03-29 NOTE — TELEPHONE ENCOUNTER
Leia with home sleep study delivery: pt ready to do sleep study, current order over a year old. Needs new order & progress note.

## 2019-04-01 NOTE — TELEPHONE ENCOUNTER
Paperwork given to CHANDA Wynn LPN for [facility]. Will be faxed with last progress note & demographics page. Please notify patient of same.

## 2019-04-04 LAB
ALBUMIN SERPL-MCNC: 3.7 G/DL (ref 3.6–5.1)
ALBUMIN/GLOB SERPL: 1 (CALC) (ref 1–2.5)
ALP SERPL-CCNC: 88 U/L (ref 33–115)
ALT SERPL-CCNC: 9 U/L (ref 6–29)
AST SERPL-CCNC: 16 U/L (ref 10–30)
BASOPHILS # BLD AUTO: 74 CELLS/UL (ref 0–200)
BASOPHILS NFR BLD AUTO: 0.7 %
BILIRUB SERPL-MCNC: 0.5 MG/DL (ref 0.2–1.2)
BUN SERPL-MCNC: 11 MG/DL (ref 7–25)
BUN/CREAT SERPL: NORMAL (CALC) (ref 6–22)
CALCIUM SERPL-MCNC: 9 MG/DL (ref 8.6–10.2)
CHLORIDE SERPL-SCNC: 102 MMOL/L (ref 98–110)
CHOLEST SERPL-MCNC: 198 MG/DL
CHOLEST/HDLC SERPL: 4.7 (CALC)
CO2 SERPL-SCNC: 30 MMOL/L (ref 20–32)
CREAT SERPL-MCNC: 0.76 MG/DL (ref 0.5–1.1)
EOSINOPHIL # BLD AUTO: 223 CELLS/UL (ref 15–500)
EOSINOPHIL NFR BLD AUTO: 2.1 %
ERYTHROCYTE [DISTWIDTH] IN BLOOD BY AUTOMATED COUNT: 20.3 % (ref 11–15)
GFRSERPLBLD MDRD-ARVRAT: 100 ML/MIN/1.73M2
GLOBULIN SER CALC-MCNC: 3.6 G/DL (CALC) (ref 1.9–3.7)
GLUCOSE SERPL-MCNC: 93 MG/DL (ref 65–99)
HCT VFR BLD AUTO: 35.2 % (ref 35–45)
HDLC SERPL-MCNC: 42 MG/DL
HGB BLD-MCNC: 10.1 G/DL (ref 11.7–15.5)
LDLC SERPL CALC-MCNC: 132 MG/DL (CALC)
LYMPHOCYTES # BLD AUTO: 3678 CELLS/UL (ref 850–3900)
LYMPHOCYTES NFR BLD AUTO: 34.7 %
MCH RBC QN AUTO: 20 PG (ref 27–33)
MCHC RBC AUTO-ENTMCNC: 28.7 G/DL (ref 32–36)
MCV RBC AUTO: 69.8 FL (ref 80–100)
MONOCYTES # BLD AUTO: 763 CELLS/UL (ref 200–950)
MONOCYTES NFR BLD AUTO: 7.2 %
MORPHOLOGY BLD-IMP: NORMAL
NEUTROPHILS # BLD AUTO: 5862 CELLS/UL (ref 1500–7800)
NEUTROPHILS NFR BLD AUTO: 55.3 %
NONHDLC SERPL-MCNC: 156 MG/DL (CALC)
PLATELET # BLD AUTO: 379 THOUSAND/UL (ref 140–400)
PMV BLD REES-ECKER: 10.5 FL (ref 7.5–12.5)
POTASSIUM SERPL-SCNC: 4.9 MMOL/L (ref 3.5–5.3)
PROT SERPL-MCNC: 7.3 G/DL (ref 6.1–8.1)
RBC # BLD AUTO: 5.04 MILLION/UL (ref 3.8–5.1)
SODIUM SERPL-SCNC: 138 MMOL/L (ref 135–146)
TRIGL SERPL-MCNC: 129 MG/DL
WBC # BLD AUTO: 10.6 THOUSAND/UL (ref 3.8–10.8)

## 2019-04-10 ENCOUNTER — OFFICE VISIT (OUTPATIENT)
Dept: FAMILY MEDICINE | Facility: CLINIC | Age: 39
End: 2019-04-10
Payer: COMMERCIAL

## 2019-04-10 VITALS
OXYGEN SATURATION: 98 % | DIASTOLIC BLOOD PRESSURE: 76 MMHG | HEART RATE: 92 BPM | SYSTOLIC BLOOD PRESSURE: 128 MMHG | WEIGHT: 293 LBS | HEIGHT: 63 IN | TEMPERATURE: 99 F | BODY MASS INDEX: 51.91 KG/M2 | RESPIRATION RATE: 16 BRPM

## 2019-04-10 DIAGNOSIS — R03.0 ELEVATED BLOOD-PRESSURE READING, WITHOUT DIAGNOSIS OF HYPERTENSION: ICD-10-CM

## 2019-04-10 DIAGNOSIS — R63.5 WEIGHT GAIN: ICD-10-CM

## 2019-04-10 DIAGNOSIS — I10 ESSENTIAL HYPERTENSION: Primary | ICD-10-CM

## 2019-04-10 DIAGNOSIS — E11.9 CONTROLLED TYPE 2 DIABETES MELLITUS WITHOUT COMPLICATION, WITHOUT LONG-TERM CURRENT USE OF INSULIN: ICD-10-CM

## 2019-04-10 DIAGNOSIS — E65 ABDOMINAL PANNUS: ICD-10-CM

## 2019-04-10 DIAGNOSIS — G47.33 OBSTRUCTIVE SLEEP APNEA: ICD-10-CM

## 2019-04-10 LAB — HBA1C MFR BLD: 6.5 %

## 2019-04-10 PROCEDURE — 3078F DIAST BP <80 MM HG: CPT | Mod: ,,, | Performed by: INTERNAL MEDICINE

## 2019-04-10 PROCEDURE — 3078F PR MOST RECENT DIASTOLIC BLOOD PRESSURE < 80 MM HG: ICD-10-PCS | Mod: ,,, | Performed by: INTERNAL MEDICINE

## 2019-04-10 PROCEDURE — 3008F PR BODY MASS INDEX (BMI) DOCUMENTED: ICD-10-PCS | Mod: ,,, | Performed by: INTERNAL MEDICINE

## 2019-04-10 PROCEDURE — 83036 HEMOGLOBIN GLYCOSYLATED A1C: CPT | Mod: QW,,, | Performed by: INTERNAL MEDICINE

## 2019-04-10 PROCEDURE — 83036 POCT HEMOGLOBIN A1C: ICD-10-PCS | Mod: QW,,, | Performed by: INTERNAL MEDICINE

## 2019-04-10 PROCEDURE — 99214 OFFICE O/P EST MOD 30 MIN: CPT | Mod: ,,, | Performed by: INTERNAL MEDICINE

## 2019-04-10 PROCEDURE — 3008F BODY MASS INDEX DOCD: CPT | Mod: ,,, | Performed by: INTERNAL MEDICINE

## 2019-04-10 PROCEDURE — 3044F HG A1C LEVEL LT 7.0%: CPT | Mod: ,,, | Performed by: INTERNAL MEDICINE

## 2019-04-10 PROCEDURE — 3074F SYST BP LT 130 MM HG: CPT | Mod: ,,, | Performed by: INTERNAL MEDICINE

## 2019-04-10 PROCEDURE — 3044F PR MOST RECENT HEMOGLOBIN A1C LEVEL <7.0%: ICD-10-PCS | Mod: ,,, | Performed by: INTERNAL MEDICINE

## 2019-04-10 PROCEDURE — 99214 PR OFFICE/OUTPT VISIT, EST, LEVL IV, 30-39 MIN: ICD-10-PCS | Mod: ,,, | Performed by: INTERNAL MEDICINE

## 2019-04-10 PROCEDURE — 3074F PR MOST RECENT SYSTOLIC BLOOD PRESSURE < 130 MM HG: ICD-10-PCS | Mod: ,,, | Performed by: INTERNAL MEDICINE

## 2019-04-10 RX ORDER — PHENTERMINE HYDROCHLORIDE 37.5 MG/1
37.5 CAPSULE ORAL DAILY
Qty: 30 CAPSULE | Refills: 0 | Status: SHIPPED | OUTPATIENT
Start: 2019-04-10 | End: 2019-06-27 | Stop reason: SDUPTHER

## 2019-04-10 RX ORDER — TOPIRAMATE 25 MG/1
25 TABLET ORAL 2 TIMES DAILY
Qty: 120 TABLET | Refills: 2 | Status: SHIPPED | OUTPATIENT
Start: 2019-04-10 | End: 2019-04-24 | Stop reason: SDUPTHER

## 2019-04-10 RX ORDER — AMLODIPINE BESYLATE 5 MG/1
5 TABLET ORAL DAILY
Qty: 30 TABLET | Refills: 5 | Status: SHIPPED | OUTPATIENT
Start: 2019-04-10 | End: 2020-07-15

## 2019-04-10 NOTE — PROGRESS NOTES
"  SUBJECTIVE:    Patient ID: Mirlande Boogie is a 38 y.o. female.    Chief Complaint: Results (labs) and Follow-up    HPI     Patient comes in with history of    HTN-controlled except with phentermine    Reactive depression-resolved-- all were situational-now in her own place-she had ASAEL but has to do it again because of the length of time passed-    Mixed hyperlipidemia    Morbid obesity-has not taken phentermine in a while and has gained 6 pounds    Type 2 diabetes without long-term current use of insulin-insulin resistance-  A1C 6.5    Patient is seeing nurse at weight loss center-    Last 3 sets of Vitals    Vitals - 1 value per visit 2018 3/25/2019 4/10/2019   SYSTOLIC 136 138 128   DIASTOLIC 96 98 76   PULSE 111 92 92   TEMPERATURE 98.2 98.6 98.9   RESPIRATIONS 19 - 16   SPO2 99 98 98   Weight (lb) 354.6 360.2 368   Weight (kg) 160.846 163.386 166.924   HEIGHT 5' 3" 5' 3" 5' 3"   BODY MASS INDEX 62.81 63.81 65.19   VISIT REPORT - - -   Pain Score  0 0 2       Office Visit on 04/10/2019   Component Date Value Ref Range Status    Hemoglobin A1C 04/10/2019 6.5  % Final       Past Medical History:   Diagnosis Date    Anemia     Back pain     CHO intolerance     Elevated BP without diagnosis of hypertension 2018    Excessive daytime sleepiness     Hypothyroidism     Hypothyroidism     Obstructive sleep apnea 2019    Ventral hernia      Past Surgical History:   Procedure Laterality Date    BREAST SURGERY       SECTION      WISDOM TOOTH EXTRACTION      all 4 removed     Family History   Problem Relation Age of Onset    Pulmonary fibrosis Mother     Early death Father 64       Marital Status:   Alcohol History:  reports that she does not drink alcohol.  Tobacco History:  reports that she has never smoked. She has never used smokeless tobacco.  Drug History:  reports that she does not use drugs.    Review of patient's allergies indicates:   Allergen Reactions    Anesthesia " s/i-40 [propofol]     Codeine        Current Outpatient Medications:     amLODIPine (NORVASC) 5 MG tablet, Take 1 tablet (5 mg total) by mouth once daily., Disp: 30 tablet, Rfl: 5    phentermine 37.5 MG capsule, Take 1 capsule (37.5 mg total) by mouth once daily., Disp: 30 capsule, Rfl: 0    topiramate (TOPAMAX) 25 MG tablet, Take 1 tablet (25 mg total) by mouth 2 (two) times daily. One twice a day for two week then 2 twice a day, Disp: 120 tablet, Rfl: 2    Review of Systems   Constitutional: Negative for appetite change, chills, diaphoresis, fatigue (post RTI), fever and unexpected weight change.   HENT: Negative for congestion, ear pain, hearing loss, nosebleeds, postnasal drip, sinus pressure, sinus pain, sneezing, sore throat, tinnitus, trouble swallowing and voice change.    Eyes: Negative for photophobia, pain, itching and visual disturbance.   Respiratory: Negative for apnea, cough, chest tightness, shortness of breath, wheezing and stridor.    Cardiovascular: Negative for chest pain, palpitations and leg swelling.   Gastrointestinal: Negative for abdominal distention, abdominal pain, blood in stool, constipation, diarrhea, nausea and vomiting.   Endocrine: Negative for cold intolerance, heat intolerance, polydipsia and polyuria.   Genitourinary: Negative for difficulty urinating, dyspareunia, dysuria, flank pain, frequency (takes diuretic), hematuria, menstrual problem, pelvic pain, urgency, vaginal discharge and vaginal pain.   Musculoskeletal: Negative for arthralgias (knees), back pain, joint swelling, myalgias, neck pain and neck stiffness.   Skin: Negative for pallor.        Pannus and under same feels irritation   Allergic/Immunologic: Negative for environmental allergies and food allergies.   Neurological: Negative for dizziness, tremors, speech difficulty, weakness, light-headedness and numbness.   Hematological: Does not bruise/bleed easily.   Psychiatric/Behavioral: Negative for agitation,  "confusion, decreased concentration, sleep disturbance and suicidal ideas. The patient is not nervous/anxious.           Objective:      Vitals:    04/10/19 1540   BP: 128/76   Pulse: 92   Resp: 16   Temp: 98.9 °F (37.2 °C)   SpO2: 98%   Weight: (!) 166.9 kg (368 lb)   Height: 5' 3" (1.6 m)     Physical Exam   Constitutional: She is oriented to person, place, and time. She appears well-developed and well-nourished. She is cooperative. No distress.   Severely increased BMI   HENT:   Head: Atraumatic.   Right Ear: Tympanic membrane normal.   Left Ear: Tympanic membrane normal.   Mouth/Throat: Uvula is midline and mucous membranes are normal.   Eyes: Pupils are equal, round, and reactive to light. Conjunctivae and EOM are normal. Right eye exhibits no discharge. Left eye exhibits no discharge. No scleral icterus. Right pupil is round and reactive. Left pupil is round and reactive.   Neck: Trachea normal and normal range of motion. Neck supple. No JVD present. Carotid bruit is not present. No thyromegaly present.   Cardiovascular: Normal rate, regular rhythm and intact distal pulses. Exam reveals no gallop and no friction rub.   No murmur heard.  Pulmonary/Chest: Effort normal and breath sounds normal. No respiratory distress. She has no wheezes. She has no rales.   Abdominal: Soft. Bowel sounds are normal. She exhibits no distension and no mass. There is no tenderness. There is no guarding. No hernia.   extreme obesity of the abdomen with lower abdominal pannus and an area of lower abdomen in the midline- second degree triangle shaped lesion about 1.5 inches witout any bleeding   Musculoskeletal: Normal range of motion. She exhibits no edema.   Neurological: She is alert and oriented to person, place, and time. She has normal strength.   Skin: Skin is warm and dry. No lesion and no rash noted. No cyanosis. Nails show no clubbing.   See abdomen   Psychiatric: She has a normal mood and affect. Her speech is normal and " behavior is normal. Judgment and thought content normal.   Nursing note and vitals reviewed.    Protective Sensation (w/ 10 gram monofilament):  Right: Intact  Left: Intact    Visual Inspection:  Normal -  Bilateral    Pedal Pulses:   Right: Present  Left: Present    Posterior tibialis:   Right:Present  Left: Present      Assessment:       1. Elevated BP without diagnosis of hypertension    2. Controlled type 2 diabetes mellitus without complication, without long-term current use of insulin    3. Obstructive sleep apnea    4. Elevated blood-pressure reading, without diagnosis of hypertension    5. Weight gain    6. BMI 60.0-69.9, adult         Plan:       Elevated BP without diagnosis of hypertension  -     amLODIPine (NORVASC) 5 MG tablet; Take 1 tablet (5 mg total) by mouth once daily.  Dispense: 30 tablet; Refill: 5    Controlled type 2 diabetes mellitus without complication, without long-term current use of insulin  -     Hemoglobin A1C, POCT  -     POCT microalbumin    Obstructive sleep apnea        -     Repeat sleep study    Elevated blood-pressure reading, without diagnosis of hypertension    Weight gain  -     phentermine 37.5 MG capsule; Take 1 capsule (37.5 mg total) by mouth once daily.  Dispense: 30 capsule; Refill: 0  -     topiramate (TOPAMAX) 25 MG tablet; Take 1 tablet (25 mg total) by mouth 2 (two) times daily. One twice a day for two week then 2 twice a day  Dispense: 120 tablet; Refill: 2    BMI 60.0-69.9, adult      No follow-ups on file.        4/10/2019 Radha Rainey M.D.

## 2019-04-12 ENCOUNTER — TELEPHONE (OUTPATIENT)
Dept: FAMILY MEDICINE | Facility: CLINIC | Age: 39
End: 2019-04-12

## 2019-04-12 NOTE — TELEPHONE ENCOUNTER
The following medication needs a prior authorization:     Medication Name: torpiamate    Dosage: 25 mg    Frequency: daily     Directions for use: BID  Diagnosis:     Is the request for a reauthorization? No     Is the patient currently stable on therapy? New therapy     Please list all therapeutic alternatives previously used with start/end dates and outcome:    Phentermine 37.5 mg 12/21/2018- 3/25/2019 no weight loss

## 2019-04-18 ENCOUNTER — TELEPHONE (OUTPATIENT)
Dept: FAMILY MEDICINE | Facility: CLINIC | Age: 39
End: 2019-04-18

## 2019-04-18 NOTE — TELEPHONE ENCOUNTER
----- Message from Rebecca Dyer LPN sent at 4/18/2019 12:34 PM CDT -----  Regarding: PA      ----- Message -----  From: Monica Suarez  Sent: 4/12/2019  11:51 AM  To: Redd Greco    PRIOR AUTHORIZATION, FORM, 4/12/19

## 2019-04-24 DIAGNOSIS — R63.5 WEIGHT GAIN: ICD-10-CM

## 2019-04-24 NOTE — TELEPHONE ENCOUNTER
Please resend Order for Topramax. Diagnosis given was for Weight gain, it is for Migraine Headaches.

## 2019-04-25 RX ORDER — TOPIRAMATE 25 MG/1
25 TABLET ORAL 2 TIMES DAILY
Qty: 120 TABLET | Refills: 2 | Status: SHIPPED | OUTPATIENT
Start: 2019-04-25 | End: 2019-06-27

## 2019-05-01 ENCOUNTER — TELEPHONE (OUTPATIENT)
Dept: FAMILY MEDICINE | Facility: CLINIC | Age: 39
End: 2019-05-01

## 2019-05-01 DIAGNOSIS — E78.2 MIXED HYPERLIPIDEMIA: ICD-10-CM

## 2019-05-01 DIAGNOSIS — D64.9 ANEMIA, UNSPECIFIED TYPE: Primary | ICD-10-CM

## 2019-05-01 RX ORDER — ATORVASTATIN CALCIUM 10 MG/1
10 TABLET, FILM COATED ORAL DAILY
Qty: 90 TABLET | Refills: 0 | Status: SHIPPED | OUTPATIENT
Start: 2019-05-01 | End: 2019-06-27 | Stop reason: SDUPTHER

## 2019-05-01 NOTE — TELEPHONE ENCOUNTER
----- Message from Radha Rainey MD sent at 4/28/2019  3:43 PM CDT -----  Patient needs to be on lipitor for elevated LDL and her diabetes and we need to recheck her lipids and hep function in 6 months-also we need to pend those studies-also, her blood count is a little low so we need to check her iron and iron binding capacity and we will recheck CBC in three months and give iron if indicated  ----- Message -----  From: Sourav Govea MA  Sent: 4/23/2019   8:38 AM  To: Radha Rainey MD

## 2019-06-27 ENCOUNTER — OFFICE VISIT (OUTPATIENT)
Dept: FAMILY MEDICINE | Facility: CLINIC | Age: 39
End: 2019-06-27
Payer: COMMERCIAL

## 2019-06-27 VITALS
RESPIRATION RATE: 16 BRPM | HEART RATE: 96 BPM | WEIGHT: 293 LBS | BODY MASS INDEX: 51.91 KG/M2 | HEIGHT: 63 IN | DIASTOLIC BLOOD PRESSURE: 84 MMHG | TEMPERATURE: 98 F | OXYGEN SATURATION: 98 % | SYSTOLIC BLOOD PRESSURE: 136 MMHG

## 2019-06-27 DIAGNOSIS — D50.9 IRON DEFICIENCY ANEMIA, UNSPECIFIED IRON DEFICIENCY ANEMIA TYPE: ICD-10-CM

## 2019-06-27 DIAGNOSIS — R63.5 WEIGHT GAIN: ICD-10-CM

## 2019-06-27 DIAGNOSIS — E78.2 MIXED HYPERLIPIDEMIA: ICD-10-CM

## 2019-06-27 DIAGNOSIS — E78.00 PURE HYPERCHOLESTEROLEMIA: Primary | ICD-10-CM

## 2019-06-27 PROCEDURE — 3008F PR BODY MASS INDEX (BMI) DOCUMENTED: ICD-10-PCS | Mod: ,,, | Performed by: INTERNAL MEDICINE

## 2019-06-27 PROCEDURE — 3079F DIAST BP 80-89 MM HG: CPT | Mod: ,,, | Performed by: INTERNAL MEDICINE

## 2019-06-27 PROCEDURE — 3075F PR MOST RECENT SYSTOLIC BLOOD PRESS GE 130-139MM HG: ICD-10-PCS | Mod: ,,, | Performed by: INTERNAL MEDICINE

## 2019-06-27 PROCEDURE — 3079F PR MOST RECENT DIASTOLIC BLOOD PRESSURE 80-89 MM HG: ICD-10-PCS | Mod: ,,, | Performed by: INTERNAL MEDICINE

## 2019-06-27 PROCEDURE — 99213 OFFICE O/P EST LOW 20 MIN: CPT | Mod: ,,, | Performed by: INTERNAL MEDICINE

## 2019-06-27 PROCEDURE — 99213 PR OFFICE/OUTPT VISIT, EST, LEVL III, 20-29 MIN: ICD-10-PCS | Mod: ,,, | Performed by: INTERNAL MEDICINE

## 2019-06-27 PROCEDURE — 3008F BODY MASS INDEX DOCD: CPT | Mod: ,,, | Performed by: INTERNAL MEDICINE

## 2019-06-27 PROCEDURE — 3075F SYST BP GE 130 - 139MM HG: CPT | Mod: ,,, | Performed by: INTERNAL MEDICINE

## 2019-06-27 RX ORDER — TOPIRAMATE 50 MG/1
1 TABLET, FILM COATED ORAL 2 TIMES DAILY
Refills: 2 | COMMUNITY
Start: 2019-04-24 | End: 2020-11-25 | Stop reason: SDUPTHER

## 2019-06-27 RX ORDER — PHENTERMINE HYDROCHLORIDE 37.5 MG/1
37.5 CAPSULE ORAL DAILY
Qty: 30 CAPSULE | Refills: 2 | Status: SHIPPED | OUTPATIENT
Start: 2019-06-27 | End: 2019-12-18

## 2019-06-27 RX ORDER — ATORVASTATIN CALCIUM 10 MG/1
10 TABLET, FILM COATED ORAL DAILY
Qty: 90 TABLET | Refills: 0 | Status: SHIPPED | OUTPATIENT
Start: 2019-06-27 | End: 2020-04-16

## 2019-06-27 NOTE — PROGRESS NOTES
SUBJECTIVE:    Patient ID: Mirlande Boogie is a 39 y.o. female.    Chief Complaint: Hypertension and Follow-up    HPI     In for recheck for HTN and WEIGHT (prior weight 368 pounds)-now she has lost 357-she ran out of phentermine and gained 10 pounds back-she is also being followed by GI surgery team who wants her to have the gastric sleeve    Labs- H/H 10.1/35.2--CHOL 198 with  AND WE RECOMMENDED LIPITOR 10 MG      Office Visit on 04/10/2019   Component Date Value Ref Range Status    Hemoglobin A1C 04/10/2019 6.5  % Final   Office Visit on 03/25/2019   Component Date Value Ref Range Status    WBC 04/03/2019 10.6  3.8 - 10.8 Thousand/uL Final    RBC 04/03/2019 5.04  3.80 - 5.10 Million/uL Final    Hemoglobin 04/03/2019 10.1* 11.7 - 15.5 g/dL Final    Hematocrit 04/03/2019 35.2  35.0 - 45.0 % Final    Mean Corpuscular Volume 04/03/2019 69.8* 80.0 - 100.0 fL Final    Mean Corpuscular Hemoglobin 04/03/2019 20.0* 27.0 - 33.0 pg Final    Mean Corpuscular Hemoglobin Conc 04/03/2019 28.7* 32.0 - 36.0 g/dL Final    RDW 04/03/2019 20.3* 11.0 - 15.0 % Final    Platelets 04/03/2019 379  140 - 400 Thousand/uL Final    MPV 04/03/2019 10.5  7.5 - 12.5 fL Final    Neutrophils Absolute 04/03/2019 5,862  1,500 - 7,800 cells/uL Final    Lymph # 04/03/2019 3,678  850 - 3,900 cells/uL Final    Mono # 04/03/2019 763  200 - 950 cells/uL Final    Eos # 04/03/2019 223  15 - 500 cells/uL Final    Baso # 04/03/2019 74  0 - 200 cells/uL Final    Neutrophils Relative 04/03/2019 55.3  % Final    Lymph% 04/03/2019 34.7  % Final    Mono% 04/03/2019 7.2  % Final    Eosinophil% 04/03/2019 2.1  % Final    Basophil% 04/03/2019 0.7  % Final    Glucose 04/03/2019 93  65 - 99 mg/dL Final    BUN, Bld 04/03/2019 11  7 - 25 mg/dL Final    Creatinine 04/03/2019 0.76  0.50 - 1.10 mg/dL Final    eGFR if non African American 04/03/2019 100  > OR = 60 mL/min/1.73m2 Final    eGFR if African American 04/03/2019 115  > OR = 60  mL/min/1.73m2 Final    BUN/Creatinine Ratio 2019 NOT APPLICABLE  6 - 22 (calc) Final    Sodium 2019 138  135 - 146 mmol/L Final    Potassium 2019 4.9  3.5 - 5.3 mmol/L Final    Chloride 2019 102  98 - 110 mmol/L Final    CO2 2019 30  20 - 32 mmol/L Final    Calcium 2019 9.0  8.6 - 10.2 mg/dL Final    Total Protein 2019 7.3  6.1 - 8.1 g/dL Final    Albumin 2019 3.7  3.6 - 5.1 g/dL Final    Globulin, Total 2019 3.6  1.9 - 3.7 g/dL (calc) Final    Albumin/Globulin Ratio 2019 1.0  1.0 - 2.5 (calc) Final    Total Bilirubin 2019 0.5  0.2 - 1.2 mg/dL Final    Alkaline Phosphatase 2019 88  33 - 115 U/L Final    AST 2019 16  10 - 30 U/L Final    ALT 2019 9  6 - 29 U/L Final    Cholesterol 2019 198  <200 mg/dL Final    HDL 2019 42* >50 mg/dL Final    Triglycerides 2019 129  <150 mg/dL Final    LDL Cholesterol 2019 132* mg/dL (calc) Final    Hdl/Cholesterol Ratio 2019 4.7  <5.0 (calc) Final    Non HDL Chol. (LDL+VLDL) 2019 156* <130 mg/dL (calc) Final    CBC Morphology 2019   NORMAL Final       Past Medical History:   Diagnosis Date    Anemia     Back pain     CHO intolerance     Elevated BP without diagnosis of hypertension 2018    Excessive daytime sleepiness     Hypothyroidism     Hypothyroidism     Obstructive sleep apnea 2019    Ventral hernia      Past Surgical History:   Procedure Laterality Date    BREAST SURGERY       SECTION      WISDOM TOOTH EXTRACTION      all 4 removed     Family History   Problem Relation Age of Onset    Pulmonary fibrosis Mother     Early death Father 64       Marital Status:   Alcohol History:  reports that she does not drink alcohol.  Tobacco History:  reports that she has never smoked. She has never used smokeless tobacco.  Drug History:  reports that she does not use drugs.    Review of patient's allergies  "indicates:   Allergen Reactions    Anesthesia s/i-40 [propofol]     Codeine        Current Outpatient Medications:     amLODIPine (NORVASC) 5 MG tablet, Take 1 tablet (5 mg total) by mouth once daily., Disp: 30 tablet, Rfl: 5    atorvastatin (LIPITOR) 10 MG tablet, Take 1 tablet (10 mg total) by mouth once daily., Disp: 90 tablet, Rfl: 0    topiramate (TOPAMAX) 50 MG tablet, Take 1 tablet by mouth 2 (two) times daily. INCREASE  MG (TWO  50 MG) IN AM AND CONTINUE 50 MG IN PM AND IN ONE MONTH TAKE 100 MG TWICE DAILY, Disp: , Rfl: 2    phentermine 37.5 MG capsule, Take 1 capsule (37.5 mg total) by mouth once daily., Disp: 30 capsule, Rfl: 0    Review of Systems   All other systems reviewed and are negative.         Objective:      Vitals:    06/27/19 1716   BP: 136/84   Pulse: 96   Resp: 16   Temp: 98.4 °F (36.9 °C)   SpO2: 98%   Weight: (!) 162.8 kg (359 lb)   Height: 5' 3" (1.6 m)     Physical Exam   Constitutional: She is oriented to person, place, and time. She appears well-developed and well-nourished. She is cooperative.   Increased BMI   HENT:   Right Ear: Tympanic membrane normal.   Left Ear: Tympanic membrane normal.   Eyes: Conjunctivae, EOM and lids are normal. Right pupil is round and reactive. Left pupil is round and reactive.   Neck: Trachea normal and normal range of motion. Neck supple. No JVD present. Carotid bruit is not present. No thyromegaly present.   Cardiovascular: Normal rate, regular rhythm, S1 normal, S2 normal, normal heart sounds and intact distal pulses. Exam reveals no gallop and no friction rub.   No murmur heard.  Pulmonary/Chest: Breath sounds normal. No respiratory distress. She has no wheezes. She has no rales.   Abdominal: Soft. Bowel sounds are normal. She exhibits no mass. There is no tenderness. There is no rigidity and no guarding.   Large phlegmon lowe abdomen-soft-non tender   Musculoskeletal: She exhibits no edema.   Neurological: She is alert and oriented to " person, place, and time.   Skin: Skin is warm and dry. Capillary refill takes less than 2 seconds. No lesion and no rash noted. Nails show no clubbing.   Psychiatric: She has a normal mood and affect. Her behavior is normal. Judgment and thought content normal.   Nursing note and vitals reviewed.        Assessment:       1. Pure hypercholesterolemia    2. Weight gain    3. Iron deficiency anemia, unspecified iron deficiency anemia type    4. BMI 60.0-69.9, adult         Plan:       Pure hypercholesterolemia        -     LIPITOR 10 MG    Weight gain        -     CONTINUE DIET-INCREASE TOPAMAX  BID          Iron deficiency anemia, unspecified iron deficiency anemia type    BMI 60.0-69.9, adult      Follow up in about 8 weeks (around 8/22/2019).        6/27/2019 Radha Rainey M.D.

## 2019-07-22 ENCOUNTER — PATIENT MESSAGE (OUTPATIENT)
Dept: FAMILY MEDICINE | Facility: CLINIC | Age: 39
End: 2019-07-22

## 2019-08-20 ENCOUNTER — PATIENT MESSAGE (OUTPATIENT)
Dept: FAMILY MEDICINE | Facility: CLINIC | Age: 39
End: 2019-08-20

## 2019-09-30 PROBLEM — E11.9 CONTROLLED TYPE 2 DIABETES MELLITUS WITHOUT COMPLICATION, WITHOUT LONG-TERM CURRENT USE OF INSULIN: Status: ACTIVE | Noted: 2018-05-06

## 2019-10-03 ENCOUNTER — TELEPHONE (OUTPATIENT)
Dept: BARIATRICS | Facility: CLINIC | Age: 39
End: 2019-10-03

## 2019-10-03 NOTE — TELEPHONE ENCOUNTER
Returned call and inquired about what Kimber might have called her for and what she was interested in.  She stated that she had gone on the [facility] site and entered in her contact information for a return call. She is very interested in the [facility]a procedure. She stated that her PCP said she would qualify and that she went to Dr Nathan and he said that she would have to lose 100 pounds for her to get the [facility]bera and that he wanted her to have bariatric surgery.  She is reaching out to us to see if she can have it done here.  Informed patient that the [facility] is usually used on patients with a BMI 30 - 40 but that I would have Kimber confirm and give her a call tomorrow.

## 2019-10-03 NOTE — TELEPHONE ENCOUNTER
----- Message from Opal Curiel sent at 10/3/2019  9:59 AM CDT -----  Contact: self [phone]  Pt states she is retunign a call from Ms. Leyva from yesterday after noon please call back to discuss.

## 2019-12-18 ENCOUNTER — OFFICE VISIT (OUTPATIENT)
Dept: FAMILY MEDICINE | Facility: CLINIC | Age: 39
End: 2019-12-18
Payer: COMMERCIAL

## 2019-12-18 VITALS
SYSTOLIC BLOOD PRESSURE: 128 MMHG | OXYGEN SATURATION: 97 % | WEIGHT: 293 LBS | HEART RATE: 110 BPM | RESPIRATION RATE: 18 BRPM | TEMPERATURE: 100 F | BODY MASS INDEX: 51.91 KG/M2 | DIASTOLIC BLOOD PRESSURE: 90 MMHG | HEIGHT: 63 IN

## 2019-12-18 DIAGNOSIS — J10.1 INFLUENZA B: Primary | ICD-10-CM

## 2019-12-18 DIAGNOSIS — J06.9 URI WITH COUGH AND CONGESTION: ICD-10-CM

## 2019-12-18 DIAGNOSIS — R63.5 WEIGHT GAIN: ICD-10-CM

## 2019-12-18 DIAGNOSIS — J02.9 SORE THROAT: ICD-10-CM

## 2019-12-18 DIAGNOSIS — R59.0 ANTERIOR CERVICAL LYMPHADENOPATHY: ICD-10-CM

## 2019-12-18 PROCEDURE — 3008F PR BODY MASS INDEX (BMI) DOCUMENTED: ICD-10-PCS | Mod: S$GLB,,, | Performed by: NURSE PRACTITIONER

## 2019-12-18 PROCEDURE — 3080F DIAST BP >= 90 MM HG: CPT | Mod: S$GLB,,, | Performed by: NURSE PRACTITIONER

## 2019-12-18 PROCEDURE — 3080F PR MOST RECENT DIASTOLIC BLOOD PRESSURE >= 90 MM HG: ICD-10-PCS | Mod: S$GLB,,, | Performed by: NURSE PRACTITIONER

## 2019-12-18 PROCEDURE — 99213 PR OFFICE/OUTPT VISIT, EST, LEVL III, 20-29 MIN: ICD-10-PCS | Mod: S$GLB,,, | Performed by: NURSE PRACTITIONER

## 2019-12-18 PROCEDURE — 3074F PR MOST RECENT SYSTOLIC BLOOD PRESSURE < 130 MM HG: ICD-10-PCS | Mod: S$GLB,,, | Performed by: NURSE PRACTITIONER

## 2019-12-18 PROCEDURE — 99213 OFFICE O/P EST LOW 20 MIN: CPT | Mod: S$GLB,,, | Performed by: NURSE PRACTITIONER

## 2019-12-18 PROCEDURE — 3074F SYST BP LT 130 MM HG: CPT | Mod: S$GLB,,, | Performed by: NURSE PRACTITIONER

## 2019-12-18 PROCEDURE — 3008F BODY MASS INDEX DOCD: CPT | Mod: S$GLB,,, | Performed by: NURSE PRACTITIONER

## 2019-12-18 RX ORDER — PROMETHAZINE HYDROCHLORIDE AND DEXTROMETHORPHAN HYDROBROMIDE 6.25; 15 MG/5ML; MG/5ML
5 SYRUP ORAL EVERY 6 HOURS PRN
Qty: 100 ML | Refills: 0 | Status: SHIPPED | OUTPATIENT
Start: 2019-12-18 | End: 2019-12-23

## 2019-12-18 RX ORDER — PHENTERMINE HYDROCHLORIDE 37.5 MG/1
37.5 CAPSULE ORAL DAILY
Qty: 30 CAPSULE | Refills: 2 | OUTPATIENT
Start: 2019-12-18

## 2019-12-18 RX ORDER — PHENTERMINE HYDROCHLORIDE 37.5 MG/1
37.5 TABLET ORAL
Qty: 30 TABLET | Refills: 0 | Status: SHIPPED | OUTPATIENT
Start: 2019-12-18 | End: 2020-01-17

## 2019-12-18 RX ORDER — OSELTAMIVIR PHOSPHATE 75 MG/1
75 CAPSULE ORAL 2 TIMES DAILY
Qty: 10 CAPSULE | Refills: 0 | Status: SHIPPED | OUTPATIENT
Start: 2019-12-18 | End: 2019-12-23

## 2019-12-18 RX ORDER — PHENTERMINE HYDROCHLORIDE 37.5 MG/1
37.5 CAPSULE ORAL DAILY
Qty: 30 CAPSULE | Refills: 2 | Status: CANCELLED | OUTPATIENT
Start: 2019-12-18

## 2019-12-18 RX ORDER — GUAIFENESIN 1200 MG/1
1 TABLET, EXTENDED RELEASE ORAL 2 TIMES DAILY
Qty: 14 TABLET | Refills: 0 | Status: SHIPPED | OUTPATIENT
Start: 2019-12-18 | End: 2019-12-25

## 2019-12-18 NOTE — PATIENT INSTRUCTIONS
Self-Care for Sore Throats    Sore throats happen for many reasons, such as colds, allergies, and infections caused by viruses or bacteria. In any case, your throat becomes red and sore. Your goal for self-care is to reduce your discomfort while giving your throat a chance to heal.  Moisten and soothe your throat  Tips include the following:  · Try a sip of water first thing after waking up.  · Keep your throat moist by drinking 6 or more glasses of clear liquids every day.  · Run a cool-air humidifier in your room overnight.  · Avoid cigarette smoke.   · Suck on throat lozenges, cough drops, hard candy, ice chips, or frozen fruit-juice bars. Use the sugar-free versions if your diet or medical condition requires them.  Gargle to ease irritation  Gargling every hour or 2 can ease irritation. Try gargling with 1 of these solutions:  · 1/4 teaspoon of salt in 1/2 cup of warm water  · An over-the-counter anesthetic gargle  Use medicine for more relief  Over-the-counter medicine can reduce sore throat symptoms. Ask your pharmacist if you have questions about which medicine to use:  · Ease pain with anesthetic sprays. Aspirin or an aspirin substitute also helps. Remember, never give aspirin to anyone 18 or younger, or if you are already taking blood thinners.   · For sore throats caused by allergies, try antihistamines to block the allergic reaction.  · Remember: unless a sore throat is caused by a bacterial infection, antibiotics wont help you.  Prevent future sore throats  Prevention tips include the following:  · Stop smoking or reduce contact with secondhand smoke. Smoke irritates the tender throat lining.  · Limit contact with pets and with allergy-causing substances, such as pollen and mold.  · When youre around someone with a sore throat or cold, wash your hands often to keep viruses or bacteria from spreading.  · Dont strain your vocal cords.  Call your healthcare provider  Contact your healthcare provider if  you have:  · A temperature over 101°F (38.3°C)  · White spots on the throat  · Great difficulty swallowing  · Trouble breathing  · A skin rash  · Recent exposure to someone else with strep bacteria  · Severe hoarseness and swollen glands in the neck or jaw   Date Last Reviewed: 8/1/2016  © 4403-7584 [employer]. [address] [place] [address], [place] [address]. All rights reserved. This information is not intended as a substitute for professional medical care. Always follow your healthcare professional's instructions.        Adult Self-Care for Colds  Colds are caused by viruses. They can't be cured with antibiotics. However, you can ease symptoms and support your body's efforts to heal itself.  No matter which symptoms you have, be sure to:  · Drink plenty of fluids (water or clear soup)  · Stop smoking and drinking alcohol  · Get plenty of rest    Understand a fever  · Take your temperature several times a day. If your fever is 100.4°F (38.0°C) for more than a day, call your healthcare provider.  · Relax, lie down. Go to bed if you want. Just get off your feet and rest. Also, drink plenty of fluids to avoid dehydration.  · Take acetaminophen or a nonsteroidal anti-inflammatory agent (NSAID), such as ibuprofen.  Treat a troubled nose kindly  · Breathe steam or heated humidified air to open blocked nasal passages.  a hot shower or use a vaporizer. Be careful not to get burned by the steam.  · Saline nasal sprays and decongestant tablets help open a stuffy nose. Antihistamines can also help, but they can cause side effects such as drowsiness and drying of the eyes, nose, and mouth.  Soothe a sore throat and cough  · Gargle every 2 hours with 1/4 teaspoon of salt dissolved in 1/2 cup of warm water. Suck on throat lozenges and cough drops to moisten your throat.  · Cough medicines are available but it is unclear how well they actually work.  · Take acetaminophen or an NSAID, such as ibuprofen, to ease  throat pain  Ease digestive problems  · Put fluids back into your body. Take frequent sips of clear liquids such as water or broth. Avoid drinks that have a lot of sugar in them, such as juices and sodas. These can make diarrhea worse. Older children and adults can drink sports drinks.  · As your appetite returns, you can resume your normal diet. Ask your healthcare provider if there are any foods you should avoid.  When to seek medical care  When you first notice symptoms, ask your healthcare provider if antiviral medicines are appropriate. Antibiotics should not be taken for colds or flu. Also, call your healthcare provider if you have any of the following symptoms or if you aren't feeling better after 7 days:  · Shortness of breath  · Pain or pressure in the chest or belly (abdomen)  · Worsening symptoms, especially after a period of improvement  · Fever of 100.4°F  (38.0°C) or higher, or fever that doesn't go down with medicine  · Sudden dizziness or confusion  · Severe or continued vomiting  · Signs of dehydration, including extreme thirst, dark urine, infrequent urination, dry mouth  · Spotted, red, or very sore throat   Date Last Reviewed: 12/1/2016  © 4165-0845 [employer]. [address] [place] [address]. All rights reserved. This information is not intended as a substitute for professional medical care. Always follow your healthcare professional's instructions.        The Flu (Influenza)     The virus that causes the flu spreads through the air in droplets when someone who has the flu coughs, sneezes, laughs, or talks.   The flu (influenza) is an infection that affects your respiratory tract. This tract is made up of your mouth, nose, and lungs, and the passages between them. Unlike a cold, the flu can make you very ill. And it can lead to pneumonia, a serious lung infection. The flu can have serious complications and even cause death.  Who is at risk for the flu?  Anyone can get  the flu. But you are more likely to become infected if you:  · Have a weakened immune system  · Work in a healthcare setting where you may be exposed to flu germs  · Live or work with someone who has the flu  · Havent had an annual flu shot  How does the flu spread?  The flu is caused by a virus. The virus spreads through the air in droplets when someone who has the flu coughs, sneezes, laughs, or talks. You can become infected when you inhale these viruses directly. You can also become infected when you touch a surface on which the droplets have landed and then transfer the germs to your eyes, nose, or mouth. Touching used tissues, or sharing utensils, drinking glasses, or a toothbrush from an infected person can expose you to flu viruses, too.  What are the symptoms of the flu?  Flu symptoms tend to come on quickly and may last a few days to a few weeks. They include:  · Fever usually higher than 100.4°F  (38°C) and chills  · Sore throat and headache  · Dry cough  · Runny nose  · Tiredness and weakness  · Muscle aches  Who is at risk for flu complications?  For some people, the flu can be very serious. The risk for complications is greater for:  · Children younger than age 5  · Adults ages 65 and older  · People with a chronic illness such as diabetes or heart, kidney, or lung disease  · People who live in a nursing home or long-term care facility   How is the flu treated?  The flu usually gets better after 7 days or so. In some cases, your healthcare provider may prescribe an antiviral medicine. This may help you get well a little sooner. For the medicine to help, you need to take it as soon as possible (ideally within 48 hours) after your symptoms start. If you develop pneumonia or other serious illness, you may need to stay in the hospital.  Easing flu symptoms  · Drink lots of fluids such as water, juice, and warm soup. A good rule is to drink enough so that you urinate your normal amount.  · Get plenty of  rest.  · Ask your healthcare provider what to take for fever and pain.  · Call your provider if your fever is 100.4°F (38°C) or higher, or you become dizzy, lightheaded, or short of breath.  Taking steps to protect others  · Wash your hands often, especially after coughing or sneezing. Or clean your hands with an alcohol-based hand  containing at least 60% alcohol.  · Cough or sneeze into a tissue. Then throw the tissue away and wash your hands. If you dont have a tissue, cough and sneeze into your elbow.  · Stay home until at least 24 hours after you no longer have a fever or chills. Be sure the fever isnt being hidden by fever-reducing medicine.  · Dont share food, utensils, drinking glasses, or a toothbrush with others.  · Ask your healthcare provider if others in your household should get antiviral medicine to help them avoid infection.  How can the flu be prevented?  · One of the best ways to avoid the flu is to get a flu vaccine each year. The virus that causes the flu changes from year to year. For that reason, healthcare providers recommend getting the flu vaccine each year, as soon as it's available in your area. The vaccine is given as a shot. Your healthcare provider can tell you which vaccine is right for you. A nasal spray is also available but is not recommended for the 6566-8462 flu season. The CDC says this is because the nasal spray did not seem to protect against the flu over the last several flu seasons. In the past, it was meant for people ages 2 to 49.  · Wash your hands often. Frequent handwashing is a proven way to help prevent infection.  · Carry an alcohol-based hand gel containing at least 60% alcohol. Use it when you can't use soap and water. Then wash your hands as soon as you can.  · Avoid touching your eyes, nose, and mouth.  · At home and work, clean phones, computer keyboards, and toys often with disinfectant wipes.  · If possible, avoid close contact with others who have  the flu or symptoms of the flu.  Handwashing tips  Handwashing is one of the best ways to prevent many common infections. If you are caring for or visiting someone with the flu, wash your hands each time you enter and leave the room. Follow these steps:  · Use warm water and plenty of soap. Rub your hands together well.  · Clean the whole hand, including under your nails, between your fingers, and up the wrists.  · Wash for at least 15 seconds.  · Rinse, letting the water run down your fingers, not up your wrists.  · Dry your hands well. Use a paper towel to turn off the faucet and open the door.  Using alcohol-based hand   Alcohol-based hand  are also a good choice. Use them when you can't use soap and water. Follow these steps:  · Squeeze about a tablespoon of gel into the palm of one hand.  · Rub your hands together briskly, cleaning the backs of your hands, the palms, between your fingers, and up the wrists.  · Rub until the gel is gone and your hands are completely dry.  Preventing the flu in healthcare settings  The flu is a special concern for people in hospitals and long-term care facilities. To help prevent the spread of flu, many hospitals and nursing homes take these steps:  · Healthcare providers wash their hands or use an alcohol-based hand  before and after treating each patient.  · People with the flu have private rooms and bathrooms or share a room with someone with the same infection.  · People who are at high risk for the flu but don't have it are encouraged to get the flu and pneumonia vaccines.  · All healthcare workers are encouraged or required to get flu shots.   Date Last Reviewed: 12/1/2016  © 4709-9228 [employer]. [address] [place] [address], [place] [address]. All rights reserved. This information is not intended as a substitute for professional medical care. Always follow your healthcare professional's instructions.

## 2019-12-18 NOTE — PROGRESS NOTES
"    SUBJECTIVE:      Patient ID: Mirlande Boogie is a 39 y.o. female.    Chief Complaint: Cough    Pt of Dr. Rainey presents for sick visit    Cough   This is a new problem. The current episode started in the past 7 days. The problem has been unchanged. The cough is productive of sputum. Associated symptoms include chest pain ("soreness"), ear congestion, a fever, headaches, nasal congestion, postnasal drip, rhinorrhea, a sore throat and shortness of breath. Pertinent negatives include no ear pain, myalgias or wheezing. She has tried rest (tessalon, nyquil, allegra) for the symptoms. The treatment provided mild relief. There is no history of asthma, bronchitis, environmental allergies or pneumonia.       Past Surgical History:   Procedure Laterality Date    BREAST SURGERY       SECTION      WISDOM TOOTH EXTRACTION      all 4 removed     Family History   Problem Relation Age of Onset    Pulmonary fibrosis Mother     Early death Father 64      Social History     Socioeconomic History    Marital status:      Spouse name: Not on file    Number of children: Not on file    Years of education: Not on file    Highest education level: Not on file   Occupational History    Not on file   Social Needs    Financial resource strain: Not on file    Food insecurity:     Worry: Not on file     Inability: Not on file    Transportation needs:     Medical: Not on file     Non-medical: Not on file   Tobacco Use    Smoking status: Never Smoker    Smokeless tobacco: Never Used   Substance and Sexual Activity    Alcohol use: No    Drug use: No    Sexual activity: Never   Lifestyle    Physical activity:     Days per week: Not on file     Minutes per session: Not on file    Stress: Not on file   Relationships    Social connections:     Talks on phone: Not on file     Gets together: Not on file     Attends [employer] service: Not on file     Active member of club or organization: Not on file     Attends " meetings of clubs or organizations: Not on file     Relationship status: Not on file   Other Topics Concern    Not on file   Social History Narrative    Not on file     Current Outpatient Medications   Medication Sig Dispense Refill    amLODIPine (NORVASC) 5 MG tablet Take 1 tablet (5 mg total) by mouth once daily. 30 tablet 5    atorvastatin (LIPITOR) 10 MG tablet Take 1 tablet (10 mg total) by mouth once daily. 90 tablet 0    phentermine 37.5 MG capsule Take 1 capsule (37.5 mg total) by mouth once daily. 30 capsule 2    topiramate (TOPAMAX) 50 MG tablet Take 1 tablet by mouth 2 (two) times daily. INCREASE  MG (TWO  50 MG) IN AM AND CONTINUE 50 MG IN PM AND IN ONE MONTH TAKE 100 MG TWICE DAILY  2    guaiFENesin 1,200 mg Ta12 Take 1 tablet by mouth 2 (two) times daily. for 7 days 14 tablet 0    oseltamivir (TAMIFLU) 75 MG capsule Take 1 capsule (75 mg total) by mouth 2 (two) times daily. for 5 days 10 capsule 0    promethazine-dextromethorphan (PROMETHAZINE-DM) 6.25-15 mg/5 mL Syrp Take 5 mLs by mouth every 6 (six) hours as needed. 100 mL 0     No current facility-administered medications for this visit.      Review of patient's allergies indicates:   Allergen Reactions    Anesthesia s/i-40 [propofol]     Codeine       Past Medical History:   Diagnosis Date    Anemia     Back pain     CHO intolerance     Elevated BP without diagnosis of hypertension 2018    Excessive daytime sleepiness     Hypothyroidism     Hypothyroidism     Obstructive sleep apnea 2019    Ventral hernia      Past Surgical History:   Procedure Laterality Date    BREAST SURGERY       SECTION      WISDOM TOOTH EXTRACTION      all 4 removed       Review of Systems   Constitutional: Positive for fever. Negative for activity change, appetite change, fatigue and unexpected weight change.   HENT: Positive for congestion, postnasal drip, rhinorrhea, sinus pressure, sinus pain and sore throat. Negative for ear  "pain, hearing loss and sneezing.    Eyes: Negative for photophobia and pain.   Respiratory: Positive for cough and shortness of breath. Negative for chest tightness and wheezing.    Cardiovascular: Positive for chest pain ("soreness"). Negative for palpitations and leg swelling.   Gastrointestinal: Negative for abdominal distention, abdominal pain, blood in stool, constipation, diarrhea, nausea and vomiting.   Endocrine: Negative for cold intolerance, heat intolerance, polydipsia and polyuria.   Genitourinary: Negative for difficulty urinating, dysuria, flank pain, frequency, hematuria, pelvic pain and urgency.   Musculoskeletal: Negative for arthralgias, back pain, joint swelling, myalgias and neck pain.   Skin: Negative for pallor.   Allergic/Immunologic: Negative for environmental allergies and food allergies.   Neurological: Positive for headaches. Negative for dizziness, weakness, light-headedness and numbness.   Hematological: Does not bruise/bleed easily.   Psychiatric/Behavioral: Negative for agitation, confusion, decreased concentration and sleep disturbance. The patient is not nervous/anxious.       OBJECTIVE:      Vitals:    12/18/19 0820   BP: (!) 128/90   Pulse: 110   Resp: 18   Temp: 100.1 °F (37.8 °C)   SpO2: 97%   Weight: (!) 156.2 kg (344 lb 6.4 oz)   Height: 5' 3" (1.6 m)     Physical Exam   Constitutional: She is oriented to person, place, and time. She appears well-developed and well-nourished. She has a sickly appearance. No distress.   Morbidly obese - 25# down since June visit   HENT:   Head: Normocephalic and atraumatic.   Right Ear: Hearing normal. A middle ear effusion (serous) is present.   Left Ear: Hearing normal. A middle ear effusion (serous) is present.   Nose: Mucosal edema present. No rhinorrhea.   Mouth/Throat: Uvula is midline and mucous membranes are normal. Posterior oropharyngeal erythema present.   Eyes: Pupils are equal, round, and reactive to light. Conjunctivae and lids are " normal. Right eye exhibits no discharge. Left eye exhibits no discharge. Right conjunctiva is not injected. Left conjunctiva is not injected. Right pupil is round and reactive. Left pupil is round and reactive. Pupils are equal.   Neck: Trachea normal and normal range of motion. Neck supple. No JVD present. No tracheal deviation present. No thyromegaly present.   Cardiovascular: Normal rate, regular rhythm, normal heart sounds and intact distal pulses. Exam reveals no gallop and no friction rub.   No murmur heard.  Pulses:       Radial pulses are 2+ on the right side, and 2+ on the left side.   Pulmonary/Chest: Effort normal and breath sounds normal. No stridor. No respiratory distress. She has no decreased breath sounds. She has no wheezes. She has no rhonchi. She has no rales.   Abdominal: Soft. Bowel sounds are normal. She exhibits no distension. There is no tenderness. There is no rigidity and no guarding.   Musculoskeletal: Normal range of motion. She exhibits no edema.   Lymphadenopathy:        Head (right side): Submandibular adenopathy present.        Head (left side): Submandibular adenopathy present.     She has cervical adenopathy.        Right cervical: Superficial cervical adenopathy present.        Left cervical: Superficial cervical adenopathy present.   Neurological: She is alert and oriented to person, place, and time. She has normal strength. She displays no atrophy. She displays a negative Romberg sign. Coordination and gait normal.   Skin: Skin is warm and dry. Capillary refill takes less than 2 seconds. No lesion and no rash noted. No cyanosis. No pallor.   Psychiatric: She has a normal mood and affect. Her speech is normal and behavior is normal. Judgment and thought content normal. Cognition and memory are normal. She is attentive.   Nursing note and vitals reviewed.     Assessment:       1. Influenza B    2. URI with cough and congestion    3. Anterior cervical lymphadenopathy    4. Sore  throat    5. Weight gain        Plan:       Influenza B  -     oseltamivir (TAMIFLU) 75 MG capsule; Take 1 capsule (75 mg total) by mouth 2 (two) times daily. for 5 days  Dispense: 10 capsule; Refill: 0    URI with cough and congestion  -     POCT Influenza A/B  -     promethazine-dextromethorphan (PROMETHAZINE-DM) 6.25-15 mg/5 mL Syrp; Take 5 mLs by mouth every 6 (six) hours as needed.  Dispense: 100 mL; Refill: 0  -     guaiFENesin 1,200 mg Ta12; Take 1 tablet by mouth 2 (two) times daily. for 7 days  Dispense: 14 tablet; Refill: 0    Anterior cervical lymphadenopathy  -     POCT rapid strep A    Sore throat  -     POCT Influenza A/B  -     POCT rapid strep A    Weight gain        - phentermine refill requested through Dr. Rainey d/t scope of practice limitations        Follow up if symptoms worsen or fail to improve.      12/18/2019 VICKIE Bruno, FNP-C

## 2020-02-04 DIAGNOSIS — E66.01 MORBID OBESITY: Primary | ICD-10-CM

## 2020-02-04 RX ORDER — PHENTERMINE HYDROCHLORIDE 37.5 MG/1
37.5 TABLET ORAL
Qty: 30 TABLET | Refills: 0 | Status: SHIPPED | OUTPATIENT
Start: 2020-02-04 | End: 2020-03-05

## 2020-02-04 RX ORDER — PHENTERMINE HYDROCHLORIDE 37.5 MG/1
37.5 CAPSULE ORAL EVERY MORNING
Qty: 30 CAPSULE | Refills: 0 | Status: SHIPPED | OUTPATIENT
Start: 2020-02-04 | End: 2020-03-05

## 2020-03-28 DIAGNOSIS — E66.01 MORBID OBESITY: ICD-10-CM

## 2020-03-28 RX ORDER — PHENTERMINE HYDROCHLORIDE 37.5 MG/1
CAPSULE ORAL
Qty: 30 CAPSULE | OUTPATIENT
Start: 2020-03-28

## 2020-04-13 ENCOUNTER — TELEPHONE (OUTPATIENT)
Dept: FAMILY MEDICINE | Facility: CLINIC | Age: 40
End: 2020-04-13

## 2020-04-13 DIAGNOSIS — E66.01 MORBID OBESITY: Primary | ICD-10-CM

## 2020-04-13 RX ORDER — PHENTERMINE HYDROCHLORIDE 37.5 MG/1
37.5 CAPSULE ORAL EVERY MORNING
Qty: 30 CAPSULE | Refills: 0 | Status: CANCELLED | OUTPATIENT
Start: 2020-04-13 | End: 2020-05-13

## 2020-04-13 NOTE — TELEPHONE ENCOUNTER
----- Message from Keya Wascom sent at 4/13/2020  9:25 AM CDT -----  Patient stopped by the office stating she needs a refill on her phentermine 37.5 MG capsule and to make sure its CAPSULE and not TABLET form.

## 2020-04-15 RX ORDER — PHENTERMINE HYDROCHLORIDE 37.5 MG/1
37.5 CAPSULE ORAL EVERY MORNING
COMMUNITY
End: 2020-04-15 | Stop reason: SDUPTHER

## 2020-04-16 ENCOUNTER — OFFICE VISIT (OUTPATIENT)
Dept: FAMILY MEDICINE | Facility: CLINIC | Age: 40
End: 2020-04-16
Payer: COMMERCIAL

## 2020-04-16 DIAGNOSIS — E66.01 MORBID OBESITY: ICD-10-CM

## 2020-04-16 DIAGNOSIS — E78.2 MIXED HYPERLIPIDEMIA: ICD-10-CM

## 2020-04-16 DIAGNOSIS — E11.9 CONTROLLED TYPE 2 DIABETES MELLITUS WITHOUT COMPLICATION, WITHOUT LONG-TERM CURRENT USE OF INSULIN: Primary | ICD-10-CM

## 2020-04-16 DIAGNOSIS — D50.9 IRON DEFICIENCY ANEMIA, UNSPECIFIED IRON DEFICIENCY ANEMIA TYPE: ICD-10-CM

## 2020-04-16 DIAGNOSIS — R03.0 ELEVATED BLOOD-PRESSURE READING WITHOUT DIAGNOSIS OF HYPERTENSION: ICD-10-CM

## 2020-04-16 PROCEDURE — 99213 OFFICE O/P EST LOW 20 MIN: CPT | Mod: 95,,, | Performed by: INTERNAL MEDICINE

## 2020-04-16 PROCEDURE — 99213 PR OFFICE/OUTPT VISIT, EST, LEVL III, 20-29 MIN: ICD-10-PCS | Mod: 95,,, | Performed by: INTERNAL MEDICINE

## 2020-04-16 RX ORDER — PHENTERMINE HYDROCHLORIDE 37.5 MG/1
37.5 CAPSULE ORAL EVERY MORNING
Qty: 90 CAPSULE | Refills: 0 | Status: SHIPPED | OUTPATIENT
Start: 2020-04-16 | End: 2021-02-25

## 2020-04-16 NOTE — PROGRESS NOTES
Subjective:        The chief complaint leading to consultation is: WEIGHT-MEDS  The patient location is:  Home  Visit type: Virtual visit with synchronous audio/video or audio only  This was a video visit in lieu of in-person visit due to the coronavirus emergency. Patient acknowledged and consented to the video visit encounter.     HPI     Recheck--weight now 299 from 359--terrific-working with  and dietician-she stopped topamax for a while on cycles--then takes the rest of the time--energy is great    BP-no issues    Acucheks-due for recheck      No visits with results within 6 Month(s) from this visit.   Latest known visit with results is:   Office Visit on 04/10/2019   Component Date Value Ref Range Status    Hemoglobin A1C 04/10/2019 6.5  % Final       Past Surgical History:   Procedure Laterality Date    BREAST SURGERY       SECTION      WISDOM TOOTH EXTRACTION      all 4 removed     Past Medical History:   Diagnosis Date    Anemia     Back pain     CHO intolerance     Elevated BP without diagnosis of hypertension 2018    Excessive daytime sleepiness     Hypothyroidism     Hypothyroidism     Morbid obesity 2018    Obstructive sleep apnea 2019    Ventral hernia      Family History   Problem Relation Age of Onset    Pulmonary fibrosis Mother     Early death Father 64        Social History:   Marital Status:   Alcohol History:  reports that she does not drink alcohol.  Tobacco History:  reports that she has never smoked. She has never used smokeless tobacco.  Drug History:  reports that she does not use drugs.    Review of patient's allergies indicates:   Allergen Reactions    Anesthesia s/i-40 [propofol]     Codeine        Current Outpatient Medications   Medication Sig Dispense Refill    phentermine 37.5 MG capsule Take 37.5 mg by mouth every morning.      amLODIPine (NORVASC) 5 MG tablet Take 1 tablet (5 mg total) by mouth once daily. 30 tablet 5     topiramate (TOPAMAX) 50 MG tablet Take 1 tablet by mouth 2 (two) times daily. INCREASE  MG (TWO  50 MG) IN AM AND CONTINUE 50 MG IN PM AND IN ONE MONTH TAKE 100 MG TWICE DAILY  2     No current facility-administered medications for this visit.        Review of Systems   Constitutional: Negative for chills, fatigue, fever and unexpected weight change.   HENT: Negative for congestion, ear pain, hearing loss, sinus pain and sore throat.    Eyes: Negative for pain and visual disturbance.   Respiratory: Negative for cough, shortness of breath and wheezing.    Cardiovascular: Negative for chest pain, palpitations and leg swelling.   Gastrointestinal: Negative for abdominal pain, blood in stool, constipation, diarrhea, nausea and vomiting.   Endocrine: Negative for cold intolerance and heat intolerance.   Genitourinary: Negative for difficulty urinating, dysuria, frequency, pelvic pain and urgency.   Musculoskeletal: Negative for back pain, joint swelling and neck pain.   Skin: Negative for pallor and rash.   Neurological: Negative for dizziness, tremors, weakness, numbness and headaches.   Hematological: Does not bruise/bleed easily.   Psychiatric/Behavioral: Negative for agitation, sleep disturbance and suicidal ideas.         Objective:      Physical Exam         Assessment:       1. Controlled type 2 diabetes mellitus without complication, without long-term current use of insulin    2. Mixed hyperlipidemia    3. Morbid obesity      Plan:   Controlled type 2 diabetes mellitus without complication, without long-term current use of insulin            -     Await studies     Mixed hyperlipidemia        -     Await studies     Morbid obesity        -     Continue weight loss program      No follow-ups on file.    Total time spent with patient: 12 minutes    Each patient to whom he or she provides medical services by telemedicine is:  (1) informed of the relationship between the physician and patient and the respective  role of any other health care provider with respect to management of the patient; and (2) notified that he or she may decline to receive medical services by telemedicine and may withdraw from such care at any time.    This note was created using [employer] voice recognition software that occasionally misinterprets phrases or words.

## 2020-07-12 ENCOUNTER — HOSPITAL ENCOUNTER (EMERGENCY)
Facility: HOSPITAL | Age: 40
Discharge: HOME | End: 2020-07-12
Attending: EMERGENCY MEDICINE
Payer: COMMERCIAL

## 2020-07-12 VITALS
DIASTOLIC BLOOD PRESSURE: 87 MMHG | TEMPERATURE: 98 F | OXYGEN SATURATION: 99 % | HEART RATE: 98 BPM | RESPIRATION RATE: 18 BRPM | SYSTOLIC BLOOD PRESSURE: 168 MMHG | WEIGHT: 286.63 LBS | BODY MASS INDEX: 50.77 KG/M2

## 2020-07-12 DIAGNOSIS — G43.909 MIGRAINE WITHOUT STATUS MIGRAINOSUS, NOT INTRACTABLE, UNSPECIFIED MIGRAINE TYPE: Primary | ICD-10-CM

## 2020-07-12 LAB
B-HCG UR QL: NEGATIVE
CTP QC/QA: YES

## 2020-07-12 PROCEDURE — 96361 HYDRATE IV INFUSION ADD-ON: CPT

## 2020-07-12 PROCEDURE — 96375 TX/PRO/DX INJ NEW DRUG ADDON: CPT

## 2020-07-12 PROCEDURE — 99284 EMERGENCY DEPT VISIT MOD MDM: CPT | Mod: 25

## 2020-07-12 PROCEDURE — 63600175 PHARM REV CODE 636 W HCPCS: Performed by: EMERGENCY MEDICINE

## 2020-07-12 PROCEDURE — 81025 URINE PREGNANCY TEST: CPT | Performed by: EMERGENCY MEDICINE

## 2020-07-12 PROCEDURE — 25000003 PHARM REV CODE 250: Performed by: EMERGENCY MEDICINE

## 2020-07-12 PROCEDURE — 96374 THER/PROPH/DIAG INJ IV PUSH: CPT

## 2020-07-12 RX ORDER — PROCHLORPERAZINE EDISYLATE 5 MG/ML
10 INJECTION INTRAMUSCULAR; INTRAVENOUS
Status: COMPLETED | OUTPATIENT
Start: 2020-07-12 | End: 2020-07-12

## 2020-07-12 RX ORDER — DIPHENHYDRAMINE HYDROCHLORIDE 50 MG/ML
25 INJECTION INTRAMUSCULAR; INTRAVENOUS
Status: COMPLETED | OUTPATIENT
Start: 2020-07-12 | End: 2020-07-12

## 2020-07-12 RX ORDER — KETOROLAC TROMETHAMINE 30 MG/ML
10 INJECTION, SOLUTION INTRAMUSCULAR; INTRAVENOUS
Status: COMPLETED | OUTPATIENT
Start: 2020-07-12 | End: 2020-07-12

## 2020-07-12 RX ORDER — BUTALBITAL, ACETAMINOPHEN AND CAFFEINE 50; 325; 40 MG/1; MG/1; MG/1
1 TABLET ORAL EVERY 6 HOURS PRN
Qty: 12 TABLET | Refills: 0 | Status: SHIPPED | OUTPATIENT
Start: 2020-07-12 | End: 2020-08-11

## 2020-07-12 RX ADMIN — DIPHENHYDRAMINE HYDROCHLORIDE 25 MG: 50 INJECTION INTRAMUSCULAR; INTRAVENOUS at 03:07

## 2020-07-12 RX ADMIN — KETOROLAC TROMETHAMINE 10 MG: 30 INJECTION, SOLUTION INTRAMUSCULAR at 03:07

## 2020-07-12 RX ADMIN — SODIUM CHLORIDE 1000 ML: 0.9 INJECTION, SOLUTION INTRAVENOUS at 03:07

## 2020-07-12 RX ADMIN — PROCHLORPERAZINE EDISYLATE 10 MG: 5 INJECTION INTRAMUSCULAR; INTRAVENOUS at 03:07

## 2020-07-12 NOTE — ED PROVIDER NOTES
Encounter Date: 2020    SCRIBE #1 NOTE: IEsther, am scribing for, and in the presence of, Dr. Marie.       History     Chief Complaint   Patient presents with    Headache    Emesis       Time seen by provider: 3:14 PM on 2020    Mirlande Boogie is a 40 y.o. female with a PMHx of hypothyroidism, anemia, elevated BP without diagnosis of HTN and obesity who presents to the ED with an onset of HA. Patient states that the HA started approximately 5 hours ago.  She notes that she usually gets migraines around her menstrual period which is due to start in 3 days.  Patient also notes that the pain is exacerbated with movement.  She confirms vomiting with a dry heave.  The patient denies visual disturbances, cough, congestion, fever, numbness or any other symptoms at this time.  No pertinent PSHx.      The history is provided by the patient.     Review of patient's allergies indicates:   Allergen Reactions    Anesthesia s/i-40 [propofol]     Codeine      Past Medical History:   Diagnosis Date    Anemia     Back pain     CHO intolerance     Elevated BP without diagnosis of hypertension 2018    Excessive daytime sleepiness     Hypothyroidism     Hypothyroidism     Morbid obesity 2018    Obstructive sleep apnea 2019    Ventral hernia      Past Surgical History:   Procedure Laterality Date    BREAST SURGERY       SECTION      WISDOM TOOTH EXTRACTION      all 4 removed     Family History   Problem Relation Age of Onset    Pulmonary fibrosis Mother     Early death Father 64     Social History     Tobacco Use    Smoking status: Never Smoker    Smokeless tobacco: Never Used   Substance Use Topics    Alcohol use: No    Drug use: No     Review of Systems   Constitutional: Negative for fever.   HENT: Negative for congestion and sore throat.    Eyes: Negative for visual disturbance.   Respiratory: Negative for cough and shortness of breath.    Cardiovascular: Negative for  chest pain.   Gastrointestinal: Positive for vomiting. Negative for nausea.   Genitourinary: Negative for dysuria.   Musculoskeletal: Negative for back pain.   Skin: Negative for rash.   Neurological: Positive for headaches. Negative for weakness and numbness.   Hematological: Does not bruise/bleed easily.       Physical Exam     Initial Vitals [07/12/20 1437]   BP Pulse Resp Temp SpO2   (!) 188/135 89 18 98.3 °F (36.8 °C) 100 %      MAP       --         Physical Exam    Nursing note and vitals reviewed.  Constitutional: She appears well-developed and well-nourished. She is not diaphoretic. No distress.   Appears uncomfortable.  Photophobic.   HENT:   Head: Normocephalic and atraumatic.   Eyes: EOM are normal. Pupils are equal, round, and reactive to light.   Neck: Normal range of motion. Neck supple.   Cardiovascular: Normal rate, regular rhythm, normal heart sounds and intact distal pulses. Exam reveals no gallop and no friction rub.    No murmur heard.  Pulmonary/Chest: Breath sounds normal. No respiratory distress. She has no wheezes. She has no rhonchi. She has no rales.   Abdominal: Soft. Bowel sounds are normal. There is no abdominal tenderness. There is no rebound and no guarding.   Musculoskeletal: Normal range of motion.   Neurological: She is alert and oriented to person, place, and time.   Skin: Skin is warm and dry.   Psychiatric: She has a normal mood and affect. Her behavior is normal. Judgment and thought content normal.         ED Course   Procedures  Labs Reviewed   POCT URINE PREGNANCY          Imaging Results    None          Medical Decision Making:   History:   Old Medical Records: I decided to obtain old medical records.  Initial Assessment:   40-year-old female presented with a headache.  Differential Diagnosis:   Initial differential diagnosis included but not limited to migraine headache, tension headache, and cluster headache.  Clinical Tests:   Lab Tests: Ordered and Reviewed  ED  Management:  Patient was emergently evaluated in the emergency department, her evaluation was significant for middle-age female with a normal neurologic exam.  The patient was aggressively treated with IV Compazine, IV Toradol, IV Benadryl, and IV fluids.  On repeat assessment, the patient reports her headache is much improved.  The etiology of her headache is likely a non intractable migraine.  She is stable for discharge to home.  She will be discharged home with p.o. Fioricet and she is to follow up with her PCP for further care.            Scribe Attestation:   Scribe #1: I performed the above scribed service and the documentation accurately describes the services I performed. I attest to the accuracy of the note.               I, Dr. Robson Marie, personally performed the services described in this documentation. All medical record entries made by the scribe were at my direction and in my presence.  I have reviewed the chart and agree that the record reflects my personal performance and is accurate and complete. Robson Marie MD.  4:48 PM 07/12/2020             Clinical Impression:       ICD-10-CM ICD-9-CM   1. Migraine without status migrainosus, not intractable, unspecified migraine type  G43.909 346.90         Disposition:   Disposition: Discharged  Condition: Stable     ED Disposition Condition    Discharge Stable        ED Prescriptions     Medication Sig Dispense Start Date End Date Auth. Provider    butalbital-acetaminophen-caffeine -40 mg (FIORICET, ESGIC) -40 mg per tablet Take 1 tablet by mouth every 6 (six) hours as needed for Headaches. 12 tablet 7/12/2020 8/11/2020 Robson Marie MD        Follow-up Information     Follow up With Specialties Details Why Contact Info    Radha Raniey MD Internal Medicine   [address] [facility] [address]  [phone]                                       Robson Marie MD  07/12/20 9548

## 2020-07-12 NOTE — ED NOTES
Patient states that she feels much better than when she arrived.   Upon discharge, patient is AAOx4, no cardiac or respiratory complications. Follow up care and  Medications have been reviewed with patient and has been instructed to return to the ER if needed. Patient verbalized understanding and ambulated to the lobby without difficulty. DENISE ALAS.

## 2020-07-12 NOTE — ED NOTES
"Patient reports that she was sleeping and then she woke up feeling heavy. "But, my headache is gone. How long will I be here." Patient is aware that she should complete her fluids. Patient verbalized understanding.   "

## 2020-07-12 NOTE — ED NOTES
"Mirlande Boogie presents to the ED with c/o headache that feels "Like pressure behind my eyes." Patient reports that her headache started at 10 am this morning. Associated complaints are dry heaving, nausea and photophobia. Patient denies any emesis. Patient reports a history of headaches, but they have "Never been this bad." Bilateral hand  are strong and equal, + sensation to upper and lower extremities, strong and equal plantar and dorsi flexion. Patient is AAOx3. Moves all extremities without difficulty. Patient denies taking any daily BP meds, "I am supposed to take the amlodipine when she takes her adipex. Mucous membranes are pink and moist. Skin is warm, dry and intact. Lungs are clear bilaterally, respirations are regular and unlabored. Denies SOB, cough, congestion or rhinorrhea. BS active x4, no tenderness with palpation, abd is soft and not distended. Denies any appetite or activity change. S1S2, capillary refill is < 2 seconds. Denies dysuria, difficulty urinating, frequency, numbness, tingling or weakness. BISHOP OJEDAS    "

## 2020-07-13 ENCOUNTER — PES CALL (OUTPATIENT)
Dept: ADMINISTRATIVE | Facility: CLINIC | Age: 40
End: 2020-07-13

## 2020-07-14 ENCOUNTER — OFFICE VISIT (OUTPATIENT)
Dept: FAMILY MEDICINE | Facility: CLINIC | Age: 40
End: 2020-07-14
Payer: COMMERCIAL

## 2020-07-14 DIAGNOSIS — I10 POORLY-CONTROLLED HYPERTENSION: Primary | ICD-10-CM

## 2020-07-14 PROCEDURE — 99213 OFFICE O/P EST LOW 20 MIN: CPT | Mod: 95,,, | Performed by: INTERNAL MEDICINE

## 2020-07-14 PROCEDURE — 99213 PR OFFICE/OUTPT VISIT, EST, LEVL III, 20-29 MIN: ICD-10-PCS | Mod: 95,,, | Performed by: INTERNAL MEDICINE

## 2020-07-14 NOTE — PROGRESS NOTES
Subjective:        The chief complaint leading to consultation is: migraine headache  The patient location is:  Home  Visit type: Virtual visit with synchronous audio/video or audio only  This was a video visit in lieu of in-person visit due to the coronavirus emergency. Patient acknowledged and consented to the video visit encounter.     HPI     This patient is a 40-year-old black female who has been seen several times in the clinic and who has had measured high blood pressure, who has insisted that she has no history of any health issues yet her blood pressure has been elevated on several occasions and she recent was placed on Norvasc 5 mg.  She also has a history of migraines and was seen in the emergency room for migraine yesterday and treated for migraine but her blood pressure was 185/135 and absolutely no attention was given to the blood pressure she was discharged with the diagnosis of migraine and no diagnosis of hypertension was given.  Looking back on her records her blood pressure has been listed is elevated blood pressure without history of hypertension.  There is no indication of any laboratory studies that have been done.  She is asking for return to work.     Patient says she gets migraine pre-cycle monthly-    Today she feels better but she feels drowsy-her HA left her last night on butalbutal-she does not have other migraines- only the pre-period migraine-usually advil takes them away-she has no more nausea-yesterday she had some issue with vision only when she turned her head-vision was slightly blurrey and she would close her eyes due to same-she was very dizzy to the point of dry heaves.this headache was much more severe-said she had some mexican food the night before and a alona-    Patient's employment involves therapist at [employer] -    Weight-has been decreasing -she did not weigh in with [employer] this past weekend-the last weight was 324    Admission on 07/12/2020,  Discharged on 2020   Component Date Value Ref Range Status    POC Preg Test, Ur 2020 Negative  Negative Final     Acceptable 2020 Yes   Final       Past Surgical History:   Procedure Laterality Date    BREAST SURGERY       SECTION      WISDOM TOOTH EXTRACTION      all 4 removed     Past Medical History:   Diagnosis Date    Anemia     Back pain     CHO intolerance     Elevated BP without diagnosis of hypertension 2018    Excessive daytime sleepiness     Hypothyroidism     Hypothyroidism     Morbid obesity 2018    Obstructive sleep apnea 2019    Ventral hernia      Family History   Problem Relation Age of Onset    Pulmonary fibrosis Mother     Early death Father 64        Social History:   Marital Status:   Alcohol History:  reports no history of alcohol use.  Tobacco History:  reports that she has never smoked. She has never used smokeless tobacco.  Drug History:  reports no history of drug use.    Review of patient's allergies indicates:   Allergen Reactions    Anesthesia s/i-40 [propofol]     Codeine        Current Outpatient Medications   Medication Sig Dispense Refill    amLODIPine (NORVASC) 5 MG tablet Take 1 tablet (5 mg total) by mouth once daily. 30 tablet 5    butalbital-acetaminophen-caffeine -40 mg (FIORICET, ESGIC) -40 mg per tablet Take 1 tablet by mouth every 6 (six) hours as needed for Headaches. 12 tablet 0    phentermine 37.5 MG capsule Take 1 capsule (37.5 mg total) by mouth every morning. 90 capsule 0    topiramate (TOPAMAX) 50 MG tablet Take 1 tablet by mouth 2 (two) times daily. INCREASE  MG (TWO  50 MG) IN AM AND CONTINUE 50 MG IN PM AND IN ONE MONTH TAKE 100 MG TWICE DAILY  2     No current facility-administered medications for this visit.        Review of Systems   Constitutional: Negative for activity change, appetite change, chills, diaphoresis, fatigue, fever and unexpected weight change.    HENT: Negative for congestion, ear pain, hearing loss, nosebleeds, postnasal drip, rhinorrhea, sinus pressure, sinus pain, sneezing, sore throat, tinnitus, trouble swallowing and voice change.    Eyes: Positive for visual disturbance (HPI). Negative for photophobia, pain, discharge and itching.   Respiratory: Negative for apnea, cough, chest tightness, shortness of breath, wheezing and stridor.    Cardiovascular: Negative for chest pain, palpitations and leg swelling.   Gastrointestinal: Positive for diarrhea (minimal yesterday ? related to IV-one episode only after going home from ER) and vomiting. Negative for abdominal distention, abdominal pain, blood in stool, constipation and nausea.   Endocrine: Negative for cold intolerance, heat intolerance, polydipsia and polyuria.   Genitourinary: Negative for difficulty urinating, dyspareunia, dysuria, flank pain, frequency, hematuria, menstrual problem, pelvic pain, urgency, vaginal discharge and vaginal pain.   Musculoskeletal: Negative for arthralgias, back pain, joint swelling, myalgias, neck pain and neck stiffness.   Skin: Negative for pallor.   Allergic/Immunologic: Negative for environmental allergies and food allergies.   Neurological: Positive for headaches (HPI). Negative for dizziness, tremors, speech difficulty, weakness, light-headedness and numbness.   Hematological: Does not bruise/bleed easily.   Psychiatric/Behavioral: Negative for agitation, confusion, decreased concentration, dysphoric mood, sleep disturbance and suicidal ideas. The patient is not nervous/anxious.          Objective:       Physical Exam         Assessment:       No diagnosis found.  Plan:   There are no diagnoses linked to this encounter.  No follow-ups on file.    Total time spent with patient: 16 minutes    Each patient to whom he or she provides medical services by telemedicine is:  (1) informed of the relationship between the physician and patient and the respective role of any  other health care provider with respect to management of the patient; and (2) notified that he or she may decline to receive medical services by telemedicine and may withdraw from such care at any time.    This note was created using [employer] voice recognition software that occasionally misinterprets phrases or words.

## 2020-07-15 ENCOUNTER — OFFICE VISIT (OUTPATIENT)
Dept: FAMILY MEDICINE | Facility: CLINIC | Age: 40
End: 2020-07-15
Payer: COMMERCIAL

## 2020-07-15 VITALS
DIASTOLIC BLOOD PRESSURE: 98 MMHG | BODY MASS INDEX: 51.91 KG/M2 | SYSTOLIC BLOOD PRESSURE: 154 MMHG | HEIGHT: 63 IN | RESPIRATION RATE: 20 BRPM | TEMPERATURE: 99 F | OXYGEN SATURATION: 97 % | WEIGHT: 293 LBS | HEART RATE: 104 BPM

## 2020-07-15 DIAGNOSIS — I10 POORLY-CONTROLLED HYPERTENSION: Primary | ICD-10-CM

## 2020-07-15 PROCEDURE — 99213 OFFICE O/P EST LOW 20 MIN: CPT | Mod: S$GLB,,, | Performed by: INTERNAL MEDICINE

## 2020-07-15 PROCEDURE — 3008F PR BODY MASS INDEX (BMI) DOCUMENTED: ICD-10-PCS | Mod: S$GLB,,, | Performed by: INTERNAL MEDICINE

## 2020-07-15 PROCEDURE — 3080F PR MOST RECENT DIASTOLIC BLOOD PRESSURE >= 90 MM HG: ICD-10-PCS | Mod: S$GLB,,, | Performed by: INTERNAL MEDICINE

## 2020-07-15 PROCEDURE — 99213 PR OFFICE/OUTPT VISIT, EST, LEVL III, 20-29 MIN: ICD-10-PCS | Mod: S$GLB,,, | Performed by: INTERNAL MEDICINE

## 2020-07-15 PROCEDURE — 3077F SYST BP >= 140 MM HG: CPT | Mod: S$GLB,,, | Performed by: INTERNAL MEDICINE

## 2020-07-15 PROCEDURE — 3080F DIAST BP >= 90 MM HG: CPT | Mod: S$GLB,,, | Performed by: INTERNAL MEDICINE

## 2020-07-15 PROCEDURE — 3077F PR MOST RECENT SYSTOLIC BLOOD PRESSURE >= 140 MM HG: ICD-10-PCS | Mod: S$GLB,,, | Performed by: INTERNAL MEDICINE

## 2020-07-15 PROCEDURE — 3008F BODY MASS INDEX DOCD: CPT | Mod: S$GLB,,, | Performed by: INTERNAL MEDICINE

## 2020-07-15 RX ORDER — DILTIAZEM HYDROCHLORIDE EXTENDED-RELEASE TABLETS 180 MG/1
180 TABLET, EXTENDED RELEASE ORAL DAILY
Qty: 30 TABLET | Refills: 11 | Status: SHIPPED | OUTPATIENT
Start: 2020-07-15 | End: 2020-10-12

## 2020-07-15 NOTE — PROGRESS NOTES
"  SUBJECTIVE:    Patient ID: Mirlande Boogie is a 40 y.o. female.    Chief Complaint: Hypertension and Follow-up    HPIt was seen in the ER for migraine headache with BP noted to be 185/135, and no follow-upof BP was recommended nor was BP rx given on ER discharge. Pt has a hx of cyclical migraine lupe this one was more severe.She had some dizziness and avoided turning her eyes to the side and keeping them open.She denies edema-and she also denies chest pain or palpitations-no shortness of breath except when working out      Last 3 sets of Vitals    Vitals - 1 value per visit 2019 2020 7/15/2020   SYSTOLIC 128 168 154   DIASTOLIC 90 87 98   PULSE 110 98 104   TEMPERATURE 100.1 98.3 99   RESPIRATIONS 18 18 20   SPO2 97 99 97   Weight (lb) 344.4 286.6 349   Weight (kg) 156.219 130 158.305   HEIGHT 5' 3" - 5' 3"   BODY MASS INDEX 61.01 50.77 61.82   VISIT REPORT - - -   Pain Score  10 - 0     Admission on 2020, Discharged on 2020   Component Date Value Ref Range Status    POC Preg Test, Ur 2020 Negative  Negative Final     Acceptable 2020 Yes   Final       Past Medical History:   Diagnosis Date    Anemia     Back pain     CHO intolerance     Elevated BP without diagnosis of hypertension 2018    Excessive daytime sleepiness     Hypothyroidism     Hypothyroidism     Morbid obesity 2018    Obstructive sleep apnea 2019    Ventral hernia      Past Surgical History:   Procedure Laterality Date    BREAST SURGERY       SECTION      WISDOM TOOTH EXTRACTION      all 4 removed     Family History   Problem Relation Age of Onset    Pulmonary fibrosis Mother     Early death Father 64       Marital Status:   Alcohol History:  reports no history of alcohol use.  Tobacco History:  reports that she has never smoked. She has never used smokeless tobacco.  Drug History:  reports no history of drug use.    Review of patient's allergies indicates: "   Allergen Reactions    Anesthesia s/i-40 [propofol]     Codeine        Current Outpatient Medications:     butalbital-acetaminophen-caffeine -40 mg (FIORICET, ESGIC) -40 mg per tablet, Take 1 tablet by mouth every 6 (six) hours as needed for Headaches., Disp: 12 tablet, Rfl: 0    diltiaZEM (CARDIZEM LA) 180 mg 24 hr tablet, Take 1 tablet (180 mg total) by mouth once daily., Disp: 30 tablet, Rfl: 11    phentermine 37.5 MG capsule, Take 1 capsule (37.5 mg total) by mouth every morning. (Patient not taking: Reported on 7/15/2020), Disp: 90 capsule, Rfl: 0    topiramate (TOPAMAX) 50 MG tablet, Take 1 tablet by mouth 2 (two) times daily. INCREASE  MG (TWO  50 MG) IN AM AND CONTINUE 50 MG IN PM AND IN ONE MONTH TAKE 100 MG TWICE DAILY, Disp: , Rfl: 2    Review of Systems   Constitutional: Negative for activity change, appetite change, chills, diaphoresis, fatigue, fever and unexpected weight change.   HENT: Negative for congestion, ear pain, hearing loss, nosebleeds, postnasal drip, rhinorrhea, sinus pressure, sinus pain, sneezing, sore throat, tinnitus, trouble swallowing and voice change.    Eyes: Negative for photophobia, pain, discharge, itching and visual disturbance.   Respiratory: Negative for apnea, cough, chest tightness, shortness of breath, wheezing and stridor.    Cardiovascular: Negative for chest pain, palpitations and leg swelling.   Gastrointestinal: Negative for abdominal distention, abdominal pain, blood in stool, constipation, diarrhea, nausea and vomiting.   Endocrine: Negative for cold intolerance, heat intolerance, polydipsia and polyuria.   Genitourinary: Negative for difficulty urinating, dyspareunia, dysuria, flank pain, frequency, hematuria, menstrual problem, pelvic pain, urgency, vaginal discharge and vaginal pain.   Musculoskeletal: Negative for arthralgias, back pain, joint swelling, myalgias, neck pain and neck stiffness.   Skin: Negative for pallor.  "  Allergic/Immunologic: Negative for environmental allergies and food allergies.   Neurological: Positive for headaches (HPI). Negative for dizziness, tremors, speech difficulty, weakness, light-headedness and numbness.   Hematological: Does not bruise/bleed easily.   Psychiatric/Behavioral: Negative for agitation, confusion, decreased concentration, dysphoric mood, sleep disturbance and suicidal ideas. The patient is not nervous/anxious.           Objective:      Vitals:    07/15/20 1108   BP: (!) 154/98   Pulse: 104   Resp: 20   Temp: 99 °F (37.2 °C)   SpO2: 97%   Weight: (!) 158.3 kg (349 lb)   Height: 5' 3" (1.6 m)     Physical Exam  Vitals signs and nursing note reviewed.   Constitutional:       General: She is not in acute distress.     Appearance: Normal appearance. She is well-developed and normal weight. She is not ill-appearing or diaphoretic.      Comments: Increased BMI   HENT:      Head: Normocephalic and atraumatic.      Right Ear: Tympanic membrane, ear canal and external ear normal. There is no impacted cerumen.      Left Ear: Tympanic membrane, ear canal and external ear normal. There is no impacted cerumen.      Nose: Nose normal.      Mouth/Throat:      Pharynx: Uvula midline.   Eyes:      General: No scleral icterus.        Right eye: No discharge.         Left eye: No discharge.      Extraocular Movements: Extraocular movements intact.      Conjunctiva/sclera: Conjunctivae normal.      Pupils: Pupils are equal, round, and reactive to light.      Right eye: Pupil is round and reactive.      Left eye: Pupil is round and reactive.   Neck:      Musculoskeletal: Normal range of motion and neck supple.      Thyroid: No thyromegaly.      Vascular: No carotid bruit or JVD.      Trachea: Trachea normal.   Cardiovascular:      Rate and Rhythm: Normal rate. Rhythm irregular.      Pulses: Normal pulses.      Heart sounds: Normal heart sounds. No murmur. No friction rub. No gallop.    Pulmonary:      Effort: " Pulmonary effort is normal. No respiratory distress.      Breath sounds: Normal breath sounds. No wheezing or rales.   Abdominal:      General: There is no distension.      Palpations: Abdomen is soft. There is no mass.      Tenderness: There is no abdominal tenderness. There is no right CVA tenderness, left CVA tenderness, guarding or rebound.      Hernia: No hernia is present.   Musculoskeletal: Normal range of motion.         General: No swelling, tenderness, deformity or signs of injury.      Right lower leg: No edema.      Left lower leg: No edema.   Skin:     General: Skin is warm and dry.      Capillary Refill: Capillary refill takes less than 2 seconds.      Coloration: Skin is not pale.      Findings: No bruising, erythema, lesion or rash.      Nails: There is no clubbing.     Neurological:      General: No focal deficit present.      Mental Status: She is alert and oriented to person, place, and time.      Cranial Nerves: No cranial nerve deficit.      Sensory: No sensory deficit.      Motor: No weakness.      Coordination: Coordination normal.      Gait: Gait normal.      Deep Tendon Reflexes: Reflexes normal.   Psychiatric:         Mood and Affect: Mood normal.         Speech: Speech normal.         Behavior: Behavior normal. Behavior is cooperative.         Thought Content: Thought content normal.         Judgment: Judgment normal.           Assessment:       1. Poorly-controlled hypertension    2. BMI 60.0-69.9, adult         Plan:       Poorly-controlled hypertension  -     diltiaZEM (CARDIZEM LA) 180 mg 24 hr tablet; Take 1 tablet (180 mg total) by mouth once daily.  Dispense: 30 tablet; Refill: 11  -     Aldosterone/Renin Activity Ratio; Future; Expected date: 07/15/2020    BMI 60.0-69.9, adult      Follow up in about 4 weeks (around 8/12/2020) for FOLLOW UP MEDICATIONS, FOLLOW-UP STATUS.        7/19/2020 Radha Rainey M.D.

## 2020-07-15 NOTE — LETTER
July 15, 2020      [facility]  [address] [address]  Phone: [phone]  Fax: [phone]       Patient: Mirlande Boogie   YOB: 1980  Date of Visit: 07/15/2020    To Whom It May Concern:    Nataliia Boogie  was at [facility] on 07/15/2020. She may return to work/school on Monday, July 20, 2020 with no restrictions. If you have any questions or concerns, or if I can be of further assistance, please do not hesitate to contact me.    Sincerely,          Radha Rainey MD

## 2020-07-15 NOTE — PATIENT INSTRUCTIONS
The patient is asked to make an attempt to improve diet and exercise patterns to aid in medical management of this problem.  Diltiazem tablets  What is this medicine?  DILTIAZEM (dil NATASHA a zem) is a calcium-channel blocker. It affects the amount of calcium found in your heart and muscle cells. This relaxes your blood vessels, which can reduce the amount of work the heart has to do. This medicine is used to treat chest pain caused by angina.  How should I use this medicine?  Take this medicine by mouth with a glass of water. Follow the directions on the prescription label. Do not cut, crush or chew this medicine. This medicine is usually taken before meals and at bedtime. Take your doses at regular intervals. Do not take your medicine more often then directed. Do not stop taking except on the advice of your doctor or health care professional.  Talk to your pediatrician regarding the use of this medicine in children. Special care may be needed.  What side effects may I notice from receiving this medicine?  Side effects that you should report to your doctor or health care professional as soon as possible:  · allergic reactions like skin rash, itching or hives, swelling of the face, lips, or tongue  · confusion, mental depression  · feeling faint or lightheaded, falls  · pinpoint red spots on the skin  · redness, blistering, peeling or loosening of the skin, including inside the mouth  · slow, irregular heartbeat  · swelling of the ankles, feet  · unusual bleeding or bruising  Side effects that usually do not require medical attention (report to your doctor or health care professional if they continue or are bothersome):  · change in sex drive or performance  · constipation or diarrhea  · flushing of the face  · headache  · nausea, vomiting  · tired or weak  · trouble sleeping  What may interact with this medicine?  Do not take this medicine with any of the  following:  · cisapride  · hawthorn  · pimozide  · ranolazine  · red yeast rice  This medicine may also interact with the following medications:  · buspirone  · carbamazepine  · cimetidine  · cyclosporine  · digoxin  · local anesthetics or general anesthetics  · lovastatin  · medicines for anxiety or difficulty sleeping like midazolam and triazolam  · medicines for high blood pressure or heart problems  · quinidine  · rifampin, rifabutin, or rifapentine  What if I miss a dose?  If you miss a dose, take it as soon as you can. If it is almost time for your next dose, take only that dose. Do not take double or extra doses.  Where should I keep my medicine?  Keep out of the reach of children.  Store at room temperature between 20 and 25 degrees C (68 and 77 degrees F). Protect from light. Keep container tightly closed. Throw away any unused medicine after the expiration date.  What should I tell my health care provider before I take this medicine?  They need to know if you have any of these conditions:  · heart problems, low blood pressure, irregular heartbeat  · liver disease  · previous heart attack  · an unusual or allergic reaction to diltiazem, other medicines, foods, dyes, or preservatives  · pregnant or trying to get pregnant  · breast-feeding  What should I watch for while using this medicine?  Check your blood pressure and pulse rate regularly. Ask your doctor or health care professional what your blood pressure and pulse rate should be and when you should contact him or her.  You may feel dizzy or lightheaded. Do not drive, use machinery, or do anything that needs mental alertness until you know how this medicine affects you. To reduce the risk of dizzy or fainting spells, do not sit or stand up quickly, especially if you are an older patient. Alcohol can make you more dizzy or increase flushing and rapid heartbeats. Avoid alcoholic drinks.  NOTE:This sheet is a summary. It may not cover all possible  information. If you have questions about this medicine, talk to your doctor, pharmacist, or health care provider. Copyright© 2017 [employer]        Discharge Instructions: Taking Calcium Channel Blockers  Your healthcare provider prescribed a medicine called a calcium channel blocker for you. This type of medicine can treat high blood pressure, correct abnormal heart rhythms, and relieve a type of chest pain called angina.     The name of your calcium channel blocker is  _____________________      Home care  · Follow the fact sheet that came with your medicine. It tells you when and how to take your medicine. Ask for a sheet if you didnt get one.  · Take this medicine exactly as directed, even if you feel fine.  · If you miss a dose of this medicine, take it as soon as you remember--unless its almost time for your next dose. In that case, just wait and take your next dose at the normal time. Dont take a double dose. If you aren't sure what to do, call your healthcare provider or your pharmacist.  · Dont drive until you know how you will react to this medicine.  · Tell your healthcare provider about any other medicines or herbal remedies you are using.  · Be sure to give this medicine time to work. It may take several weeks to lower blood pressure.  · Learn to take your own pulse. Keep a record of your results. Ask your provider which pulse rates mean that you need medical attention.  · Dont eat grapefruit or drink grapefruit juice. These may interact with calcium channel blockers.  · Ask your healthcare provider how much exercise and activity is safe.  · See your provider regularly while taking this medicine.  Possible side effects  Tell your healthcare provider if you have any of these side effects. Dont stop taking the medicine unless your doctor tells you to. Mild side effects include:  · Sore, bleeding gums  · Mild headache  · Dizziness or lightheadedness  · Flushing  · Nausea  · Leg swelling  When to  call your healthcare provider  Call your healthcare provider right away if you have any of the following:  · Severe headache  · Slow, weak pulse  · Breathing problems, coughing, or wheezing  · Irregular, fast, or pounding heartbeat  · Skin rash  · Swollen ankles, feet, or lower legs  · Constipation   Date Last Reviewed: 6/1/2016  © 1080-4503 [employer]. [address] [place] [address]. All rights reserved. This information is not intended as a substitute for professional medical care. Always follow your healthcare professional's instructions.        Controlling High Blood Pressure  High blood pressure (hypertension) is often called the silent killer. This is because many people who have it dont know it. High blood pressure is defined as 140/90 mm Hg or higher. Know your blood pressure and remember to check it regularly. Doing so can save your life. Here are some things you can do to help control your blood pressure.    Choose heart-healthy foods  · Select low-salt, low-fat foods. Limit sodium intake to 2,400 mg per day or the amount suggested by your healthcare provider.  · Limit canned, dried, cured, packaged, and fast foods. These can contain a lot of salt.  · Eat 8 to 10 servings of fruits and vegetables every day.  · Choose lean meats, fish, or chicken.  · Eat whole-grain pasta, brown rice, and beans.  · Eat 2 to 3 servings of low-fat or fat-free dairy products.  · Ask your doctor about the DASH eating plan. This plan helps reduce blood pressure.  · When you go to a restaurant, ask that your meal be prepared with no added salt.  Maintain a healthy weight  · Ask your healthcare provider how many calories to eat a day. Then stick to that number.  · Ask your healthcare provider what weight range is healthiest for you. If you are overweight, a weight loss of only 3% to 5% of your body weight can help lower blood pressure. Generally, a good weight loss goal is to lose 10% of your body weight  in a year.  · Limit snacks and sweets.  · Get regular exercise.  Get up and get active  · Choose activities you enjoy. Find ones you can do with friends or family. This includes bicycling, dancing, walking, and jogging.  · Park farther away from building entrances.  · Use stairs instead of the elevator.  · When you can, walk or bike instead of driving.  · [place] leaves, garden, or do household repairs.  · Be active at a moderate to vigorous level of physical activity for at least 40 minutes for a minimum of 3 to 4 days a week.   Manage stress  · Make time to relax and enjoy life. Find time to laugh.  · Communicate your concerns with your loved ones and your healthcare provider.  · Visit with family and friends, and keep up with hobbies.  Limit alcohol and quit smoking  · Men should have no more than 2 drinks per day.  · Women should have no more than 1 drink per day.  · Talk with your healthcare provider about quitting smoking. Smoking significantly increases your risk for heart disease and stroke. Ask your healthcare provider about community smoking cessation programs and other options.  Medicines  If lifestyle changes arent enough, your healthcare provider may prescribe high blood pressure medicine. Take all medicines as prescribed. If you have any questions about your medicines, ask your healthcare provider before stopping or changing them.   Date Last Reviewed: 4/27/2016  © 3672-6684 [employer]. [address] [place] [address], [place] [address]. All rights reserved. This information is not intended as a substitute for professional medical care. Always follow your healthcare professional's instructions.

## 2020-09-28 ENCOUNTER — TELEPHONE (OUTPATIENT)
Dept: FAMILY MEDICINE | Facility: CLINIC | Age: 40
End: 2020-09-28
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
## 2020-10-12 ENCOUNTER — OFFICE VISIT (OUTPATIENT)
Dept: FAMILY MEDICINE | Facility: CLINIC | Age: 40
End: 2020-10-12
Payer: COMMERCIAL

## 2020-10-12 DIAGNOSIS — I10 POORLY-CONTROLLED HYPERTENSION: Primary | ICD-10-CM

## 2020-10-12 DIAGNOSIS — Z12.4 CERVICAL CANCER SCREENING: ICD-10-CM

## 2020-10-12 DIAGNOSIS — Z12.31 ENCOUNTER FOR SCREENING MAMMOGRAM FOR BREAST CANCER: ICD-10-CM

## 2020-10-12 PROBLEM — R03.0 ELEVATED BLOOD-PRESSURE READING, WITHOUT DIAGNOSIS OF HYPERTENSION: Status: RESOLVED | Noted: 2019-04-10 | Resolved: 2020-10-12

## 2020-10-12 PROCEDURE — 99214 OFFICE O/P EST MOD 30 MIN: CPT | Mod: S$GLB,,, | Performed by: INTERNAL MEDICINE

## 2020-10-12 PROCEDURE — 3080F PR MOST RECENT DIASTOLIC BLOOD PRESSURE >= 90 MM HG: ICD-10-PCS | Mod: S$GLB,,, | Performed by: INTERNAL MEDICINE

## 2020-10-12 PROCEDURE — 3077F SYST BP >= 140 MM HG: CPT | Mod: S$GLB,,, | Performed by: INTERNAL MEDICINE

## 2020-10-12 PROCEDURE — 3077F PR MOST RECENT SYSTOLIC BLOOD PRESSURE >= 140 MM HG: ICD-10-PCS | Mod: S$GLB,,, | Performed by: INTERNAL MEDICINE

## 2020-10-12 PROCEDURE — 99214 PR OFFICE/OUTPT VISIT, EST, LEVL IV, 30-39 MIN: ICD-10-PCS | Mod: S$GLB,,, | Performed by: INTERNAL MEDICINE

## 2020-10-12 PROCEDURE — 3008F BODY MASS INDEX DOCD: CPT | Mod: S$GLB,,, | Performed by: INTERNAL MEDICINE

## 2020-10-12 PROCEDURE — 3008F PR BODY MASS INDEX (BMI) DOCUMENTED: ICD-10-PCS | Mod: S$GLB,,, | Performed by: INTERNAL MEDICINE

## 2020-10-12 PROCEDURE — 3080F DIAST BP >= 90 MM HG: CPT | Mod: S$GLB,,, | Performed by: INTERNAL MEDICINE

## 2020-10-12 RX ORDER — DILTIAZEM HYDROCHLORIDE 240 MG/1
240 CAPSULE, COATED, EXTENDED RELEASE ORAL DAILY
Qty: 30 CAPSULE | Refills: 2 | Status: SHIPPED | OUTPATIENT
Start: 2020-10-12 | End: 2020-11-25 | Stop reason: SDUPTHER

## 2020-10-13 LAB
ALBUMIN SERPL-MCNC: 3.9 G/DL (ref 3.6–5.1)
ALBUMIN/GLOB SERPL: 1.1 (CALC) (ref 1–2.5)
ALP SERPL-CCNC: 79 U/L (ref 31–125)
ALT SERPL-CCNC: 9 U/L (ref 6–29)
AST SERPL-CCNC: 13 U/L (ref 10–30)
BASOPHILS # BLD AUTO: 70 CELLS/UL (ref 0–200)
BASOPHILS NFR BLD AUTO: 0.9 %
BILIRUB SERPL-MCNC: 0.7 MG/DL (ref 0.2–1.2)
BUN SERPL-MCNC: 12 MG/DL (ref 7–25)
BUN/CREAT SERPL: NORMAL (CALC) (ref 6–22)
CALCIUM SERPL-MCNC: 9.1 MG/DL (ref 8.6–10.2)
CHLORIDE SERPL-SCNC: 103 MMOL/L (ref 98–110)
CHOLEST SERPL-MCNC: 169 MG/DL
CHOLEST/HDLC SERPL: 4.7 (CALC)
CO2 SERPL-SCNC: 30 MMOL/L (ref 20–32)
CREAT SERPL-MCNC: 0.93 MG/DL (ref 0.5–1.1)
EOSINOPHIL # BLD AUTO: 211 CELLS/UL (ref 15–500)
EOSINOPHIL NFR BLD AUTO: 2.7 %
ERYTHROCYTE [DISTWIDTH] IN BLOOD BY AUTOMATED COUNT: 21.3 % (ref 11–15)
FERRITIN SERPL-MCNC: 6 NG/ML (ref 16–154)
GFRSERPLBLD MDRD-ARVRAT: 77 ML/MIN/1.73M2
GLOBULIN SER CALC-MCNC: 3.6 G/DL (CALC) (ref 1.9–3.7)
GLUCOSE SERPL-MCNC: 79 MG/DL (ref 65–99)
HBA1C MFR BLD: 6.1 % OF TOTAL HGB
HCT VFR BLD AUTO: 32.6 % (ref 35–45)
HDLC SERPL-MCNC: 36 MG/DL
HGB BLD-MCNC: 8.9 G/DL (ref 11.7–15.5)
IRON SATN MFR SERPL: 4 % (CALC) (ref 16–45)
IRON SERPL-MCNC: 20 MCG/DL (ref 40–190)
LDLC SERPL CALC-MCNC: 107 MG/DL (CALC)
LYMPHOCYTES # BLD AUTO: 2660 CELLS/UL (ref 850–3900)
LYMPHOCYTES NFR BLD AUTO: 34.1 %
MCH RBC QN AUTO: 18.1 PG (ref 27–33)
MCHC RBC AUTO-ENTMCNC: 27.3 G/DL (ref 32–36)
MCV RBC AUTO: 66.1 FL (ref 80–100)
MONOCYTES # BLD AUTO: 647 CELLS/UL (ref 200–950)
MONOCYTES NFR BLD AUTO: 8.3 %
NEUTROPHILS # BLD AUTO: 4212 CELLS/UL (ref 1500–7800)
NEUTROPHILS NFR BLD AUTO: 54 %
NONHDLC SERPL-MCNC: 133 MG/DL (CALC)
PLATELET # BLD AUTO: 447 THOUSAND/UL (ref 140–400)
PMV BLD REES-ECKER: ABNORMAL FL (ref 7.5–12.5)
POTASSIUM SERPL-SCNC: 4.7 MMOL/L (ref 3.5–5.3)
PROT SERPL-MCNC: 7.5 G/DL (ref 6.1–8.1)
RBC # BLD AUTO: 4.93 MILLION/UL (ref 3.8–5.1)
SERVICE CMNT-IMP: ABNORMAL
SODIUM SERPL-SCNC: 139 MMOL/L (ref 135–146)
TIBC SERPL-MCNC: 448 MCG/DL (CALC) (ref 250–450)
TRIGL SERPL-MCNC: 143 MG/DL
WBC # BLD AUTO: 7.8 THOUSAND/UL (ref 3.8–10.8)

## 2020-10-14 VITALS
HEIGHT: 63 IN | SYSTOLIC BLOOD PRESSURE: 150 MMHG | OXYGEN SATURATION: 99 % | BODY MASS INDEX: 51.91 KG/M2 | WEIGHT: 293 LBS | TEMPERATURE: 98 F | DIASTOLIC BLOOD PRESSURE: 92 MMHG | HEART RATE: 92 BPM | RESPIRATION RATE: 18 BRPM
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
## 2020-10-19 ENCOUNTER — OFFICE VISIT (OUTPATIENT)
Dept: FAMILY MEDICINE | Facility: CLINIC | Age: 40
End: 2020-10-19
Payer: COMMERCIAL

## 2020-10-19 VITALS
RESPIRATION RATE: 20 BRPM | DIASTOLIC BLOOD PRESSURE: 90 MMHG | WEIGHT: 293 LBS | SYSTOLIC BLOOD PRESSURE: 142 MMHG | HEIGHT: 63 IN | OXYGEN SATURATION: 99 % | HEART RATE: 94 BPM | BODY MASS INDEX: 51.91 KG/M2 | TEMPERATURE: 99 F

## 2020-10-19 DIAGNOSIS — E11.9 CONTROLLED TYPE 2 DIABETES MELLITUS WITHOUT COMPLICATION, WITHOUT LONG-TERM CURRENT USE OF INSULIN: ICD-10-CM

## 2020-10-19 DIAGNOSIS — D50.0 IRON DEFICIENCY ANEMIA DUE TO CHRONIC BLOOD LOSS: Primary | ICD-10-CM

## 2020-10-19 DIAGNOSIS — I10 POORLY-CONTROLLED HYPERTENSION: ICD-10-CM

## 2020-10-19 PROCEDURE — 3077F SYST BP >= 140 MM HG: CPT | Mod: S$GLB,,, | Performed by: INTERNAL MEDICINE

## 2020-10-19 PROCEDURE — 3077F PR MOST RECENT SYSTOLIC BLOOD PRESSURE >= 140 MM HG: ICD-10-PCS | Mod: S$GLB,,, | Performed by: INTERNAL MEDICINE

## 2020-10-19 PROCEDURE — 3080F PR MOST RECENT DIASTOLIC BLOOD PRESSURE >= 90 MM HG: ICD-10-PCS | Mod: S$GLB,,, | Performed by: INTERNAL MEDICINE

## 2020-10-19 PROCEDURE — 3008F BODY MASS INDEX DOCD: CPT | Mod: S$GLB,,, | Performed by: INTERNAL MEDICINE

## 2020-10-19 PROCEDURE — 3044F HG A1C LEVEL LT 7.0%: CPT | Mod: S$GLB,,, | Performed by: INTERNAL MEDICINE

## 2020-10-19 PROCEDURE — 3044F PR MOST RECENT HEMOGLOBIN A1C LEVEL <7.0%: ICD-10-PCS | Mod: S$GLB,,, | Performed by: INTERNAL MEDICINE

## 2020-10-19 PROCEDURE — 3008F PR BODY MASS INDEX (BMI) DOCUMENTED: ICD-10-PCS | Mod: S$GLB,,, | Performed by: INTERNAL MEDICINE

## 2020-10-19 PROCEDURE — 99213 OFFICE O/P EST LOW 20 MIN: CPT | Mod: S$GLB,,, | Performed by: INTERNAL MEDICINE

## 2020-10-19 PROCEDURE — 99213 PR OFFICE/OUTPT VISIT, EST, LEVL III, 20-29 MIN: ICD-10-PCS | Mod: S$GLB,,, | Performed by: INTERNAL MEDICINE

## 2020-10-19 PROCEDURE — 3080F DIAST BP >= 90 MM HG: CPT | Mod: S$GLB,,, | Performed by: INTERNAL MEDICINE

## 2020-10-19 RX ORDER — SEMAGLUTIDE 1.34 MG/ML
0.25 INJECTION, SOLUTION SUBCUTANEOUS
Qty: 1 ML | Refills: 0 | Status: SHIPPED | OUTPATIENT
Start: 2020-10-19 | End: 2020-11-25 | Stop reason: SDUPTHER

## 2020-10-19 RX ORDER — TOPIRAMATE SPINKLE 25 MG/1
CAPSULE ORAL
Qty: 120 CAPSULE | Refills: 5 | Status: SHIPPED | OUTPATIENT
Start: 2020-10-19 | End: 2021-04-14

## 2020-11-22 NOTE — PROGRESS NOTES
SUBJECTIVE:    Patient ID: Mirlande Boogie is a 40 y.o. female.    Chief Complaint: Hypertension, Weight Check, Follow-up, and Diabetic Eye Photo    HPI     Diabetes-Current symptoms include: none. Patient denies foot ulcerations, increased appetite, nausea, paresthesia of the feet, polydipsia, polyuria, visual disturbances, vomiting and weight loss. Evaluation to date has included: fasting blood sugar , fasting lipid panel, hemoglobin A1C and microalbuminuria. Home sugars: patient does not check sugars. Current treatments: Continued Ozempic which has been effective.       Hypertension- Home blood pressure readings: high. Salt intake and diet: salt not added to cooking. Usual weight: 358. Associated signs and symptoms: none. Patient denies: blurred vision, chest pain, dyspnea, headache, neck aches, orthopnea, palpitations, paroxysmal nocturnal dyspnea, peripheral edema, pulsating in the ears and tiredness/fatigue. Use of agents associated with hypertension: none. Medication compliance: taking as prescribed.    /86    Admission on 2020, Discharged on 2020   Component Date Value Ref Range Status    POC Preg Test, Ur 2020 Negative  Negative Final     Acceptable 2020 Yes   Final       Past Medical History:   Diagnosis Date    Anemia     Back pain     CHO intolerance     Elevated BP without diagnosis of hypertension 2018    Excessive daytime sleepiness     Hypothyroidism     Hypothyroidism     Morbid obesity 2018    Obstructive sleep apnea 2019    Ventral hernia      Past Surgical History:   Procedure Laterality Date    BREAST SURGERY       SECTION      WISDOM TOOTH EXTRACTION      all 4 removed     Family History   Problem Relation Age of Onset    Pulmonary fibrosis Mother     Early death Father 64       Marital Status:   Alcohol History:  reports no history of alcohol use.  Tobacco History:  reports that she has never smoked. She  "has never used smokeless tobacco.  Drug History:  reports no history of drug use.    Review of patient's allergies indicates:   Allergen Reactions    Anesthesia s/i-40 [propofol]     Codeine        Current Outpatient Medications:     diltiaZEM (CARDIZEM CD) 240 MG 24 hr capsule, Take 1 capsule (240 mg total) by mouth once daily., Disp: 30 capsule, Rfl: 2    pen needle, diabetic (PEN NEEDLE) 31 gauge x 5/16" Ndle, 1 each by Misc.(Non-Drug; Combo Route) route once a week. To be used with ozempic, Disp: 30 each, Rfl: 1    semaglutide (OZEMPIC) 0.25 mg or 0.5 mg(2 mg/1.5 mL) PnIj, Inject 0.25 mg into the skin every 7 days., Disp: 3 mL, Rfl: 0    topiramate 25 mg capsule, Take 4 caps daily-2 bid or 4 hs, Disp: 120 capsule, Rfl: 5    atorvastatin (LIPITOR) 40 MG tablet, Take 1 tablet (40 mg total) by mouth once daily., Disp: 90 tablet, Rfl: 1    lisinopriL (PRINIVIL,ZESTRIL) 2.5 MG tablet, Take 1 tablet (2.5 mg total) by mouth once daily., Disp: 90 tablet, Rfl: 3    phentermine 37.5 MG capsule, Take 1 capsule (37.5 mg total) by mouth every morning. (Patient not taking: Reported on 7/15/2020), Disp: 90 capsule, Rfl: 0    Review of Systems   Constitutional: Negative for activity change, appetite change, chills, fatigue, fever and unexpected weight change.   HENT: Negative for congestion, ear pain, hearing loss, postnasal drip, sinus pain, sneezing, sore throat, tinnitus and trouble swallowing.    Eyes: Negative for pain, discharge and visual disturbance.   Respiratory: Negative for cough, choking, shortness of breath and wheezing.    Cardiovascular: Negative for chest pain, palpitations and leg swelling.   Gastrointestinal: Negative for abdominal distention, abdominal pain, blood in stool, constipation, diarrhea, nausea and vomiting.   Endocrine: Negative for cold intolerance and heat intolerance.   Genitourinary: Negative for difficulty urinating, dysuria, frequency, pelvic pain and urgency.   Musculoskeletal: " "Negative for back pain, joint swelling and neck pain.   Skin: Negative for pallor and rash.        Panniculectomy scheduled for early 2021   Allergic/Immunologic: Negative for environmental allergies and food allergies.   Neurological: Negative for dizziness, tremors, weakness, numbness and headaches.   Hematological: Does not bruise/bleed easily.   Psychiatric/Behavioral: Negative for agitation, confusion, dysphoric mood, sleep disturbance and suicidal ideas. The patient is not nervous/anxious.           Objective:      Vitals:    11/25/20 0850 11/25/20 0912   BP: 136/88 (!) 150/86   Pulse: 90    Resp: 18    Temp: 97.5 °F (36.4 °C)    SpO2: 98%    Weight: (!) 162.4 kg (358 lb)    Height: 5' 3" (1.6 m)      Physical Exam  Constitutional:       General: She is not in acute distress.     Appearance: She is obese. She is not ill-appearing.   HENT:      Head: Normocephalic and atraumatic.   Eyes:      Extraocular Movements: Extraocular movements intact.      Conjunctiva/sclera: Conjunctivae normal.      Pupils: Pupils are equal, round, and reactive to light.   Neck:      Musculoskeletal: Normal range of motion and neck supple.   Cardiovascular:      Rate and Rhythm: Normal rate and regular rhythm.      Pulses: Normal pulses.      Heart sounds: Normal heart sounds.   Pulmonary:      Effort: Pulmonary effort is normal.      Breath sounds: Normal breath sounds.   Abdominal:      General: Bowel sounds are normal.      Palpations: Abdomen is soft.      Comments: abd rotund   Musculoskeletal: Normal range of motion.   Skin:     General: Skin is warm and dry.   Neurological:      General: No focal deficit present.      Mental Status: She is alert and oriented to person, place, and time.   Psychiatric:         Mood and Affect: Mood normal.         Behavior: Behavior normal.         Thought Content: Thought content normal.         Judgment: Judgment normal.           Assessment:       1. Controlled type 2 diabetes mellitus " "without complication, without long-term current use of insulin    2. Poorly-controlled hypertension    3. Need for hepatitis C screening test         Plan:       Controlled type 2 diabetes mellitus without complication, without long-term current use of insulin  -     Basic Metabolic Panel; Future; Expected date: 11/25/2020  -     Hemoglobin A1C; Future; Expected date: 01/12/2021  -     Microalbumin/creatinine urine ratio; Future; Expected date: 01/25/2021  -     semaglutide (OZEMPIC) 0.25 mg or 0.5 mg(2 mg/1.5 mL) PnIj; Inject 0.25 mg into the skin every 7 days.  Dispense: 3 mL; Refill: 0  -     pen needle, diabetic (PEN NEEDLE) 31 gauge x 5/16" Ndle; 1 each by Misc.(Non-Drug; Combo Route) route once a week. To be used with ozempic  Dispense: 30 each; Refill: 1  -     Diabetic Eye Screening Photo; Future  -     atorvastatin (LIPITOR) 40 MG tablet; Take 1 tablet (40 mg total) by mouth once daily.  Dispense: 90 tablet; Refill: 1  -     lisinopriL (PRINIVIL,ZESTRIL) 2.5 MG tablet; Take 1 tablet (2.5 mg total) by mouth once daily.  Dispense: 90 tablet; Refill: 3    Poorly-controlled hypertension  -     Basic Metabolic Panel; Future; Expected date: 11/25/2020  -     diltiaZEM (CARDIZEM CD) 240 MG 24 hr capsule; Take 1 capsule (240 mg total) by mouth once daily.  Dispense: 30 capsule; Refill: 2    Need for hepatitis C screening test  -     Hepatitis C Antibody; Future; Expected date: 11/25/2020      Follow up in about 3 months (around 2/25/2021) for weight-DM.        11/25/2020 Radha Rainey M.D.      "

## 2020-11-25 ENCOUNTER — CLINICAL SUPPORT (OUTPATIENT)
Dept: FAMILY MEDICINE | Facility: CLINIC | Age: 40
End: 2020-11-25
Attending: INTERNAL MEDICINE
Payer: COMMERCIAL

## 2020-11-25 ENCOUNTER — OFFICE VISIT (OUTPATIENT)
Dept: FAMILY MEDICINE | Facility: CLINIC | Age: 40
End: 2020-11-25
Payer: COMMERCIAL

## 2020-11-25 VITALS
OXYGEN SATURATION: 98 % | BODY MASS INDEX: 51.91 KG/M2 | HEART RATE: 90 BPM | WEIGHT: 293 LBS | SYSTOLIC BLOOD PRESSURE: 150 MMHG | TEMPERATURE: 98 F | DIASTOLIC BLOOD PRESSURE: 86 MMHG | HEIGHT: 63 IN | RESPIRATION RATE: 18 BRPM

## 2020-11-25 DIAGNOSIS — I10 POORLY-CONTROLLED HYPERTENSION: ICD-10-CM

## 2020-11-25 DIAGNOSIS — E11.9 CONTROLLED TYPE 2 DIABETES MELLITUS WITHOUT COMPLICATION, WITHOUT LONG-TERM CURRENT USE OF INSULIN: Primary | ICD-10-CM

## 2020-11-25 DIAGNOSIS — E11.9 CONTROLLED TYPE 2 DIABETES MELLITUS WITHOUT COMPLICATION, WITHOUT LONG-TERM CURRENT USE OF INSULIN: ICD-10-CM

## 2020-11-25 DIAGNOSIS — Z11.59 NEED FOR HEPATITIS C SCREENING TEST: ICD-10-CM

## 2020-11-25 PROCEDURE — 99214 PR OFFICE/OUTPT VISIT, EST, LEVL IV, 30-39 MIN: ICD-10-PCS | Mod: S$GLB,,, | Performed by: INTERNAL MEDICINE

## 2020-11-25 PROCEDURE — 3079F PR MOST RECENT DIASTOLIC BLOOD PRESSURE 80-89 MM HG: ICD-10-PCS | Mod: S$GLB,,, | Performed by: INTERNAL MEDICINE

## 2020-11-25 PROCEDURE — 3074F SYST BP LT 130 MM HG: CPT | Mod: S$GLB,,, | Performed by: INTERNAL MEDICINE

## 2020-11-25 PROCEDURE — 99214 OFFICE O/P EST MOD 30 MIN: CPT | Mod: S$GLB,,, | Performed by: INTERNAL MEDICINE

## 2020-11-25 PROCEDURE — 1126F AMNT PAIN NOTED NONE PRSNT: CPT | Mod: S$GLB,,, | Performed by: INTERNAL MEDICINE

## 2020-11-25 PROCEDURE — 92250 DIABETIC EYE SCREENING PHOTO: ICD-10-PCS | Mod: TC,,, | Performed by: INTERNAL MEDICINE

## 2020-11-25 PROCEDURE — 3044F HG A1C LEVEL LT 7.0%: CPT | Mod: S$GLB,,, | Performed by: INTERNAL MEDICINE

## 2020-11-25 PROCEDURE — 3044F PR MOST RECENT HEMOGLOBIN A1C LEVEL <7.0%: ICD-10-PCS | Mod: S$GLB,,, | Performed by: INTERNAL MEDICINE

## 2020-11-25 PROCEDURE — 3008F PR BODY MASS INDEX (BMI) DOCUMENTED: ICD-10-PCS | Mod: S$GLB,,, | Performed by: INTERNAL MEDICINE

## 2020-11-25 PROCEDURE — 3079F DIAST BP 80-89 MM HG: CPT | Mod: S$GLB,,, | Performed by: INTERNAL MEDICINE

## 2020-11-25 PROCEDURE — 1126F PR PAIN SEVERITY QUANTIFIED, NO PAIN PRESENT: ICD-10-PCS | Mod: S$GLB,,, | Performed by: INTERNAL MEDICINE

## 2020-11-25 PROCEDURE — 3074F PR MOST RECENT SYSTOLIC BLOOD PRESSURE < 130 MM HG: ICD-10-PCS | Mod: S$GLB,,, | Performed by: INTERNAL MEDICINE

## 2020-11-25 PROCEDURE — 3008F BODY MASS INDEX DOCD: CPT | Mod: S$GLB,,, | Performed by: INTERNAL MEDICINE

## 2020-11-25 PROCEDURE — 92250 FUNDUS PHOTOGRAPHY W/I&R: CPT | Mod: TC,,, | Performed by: INTERNAL MEDICINE

## 2020-11-25 RX ORDER — LISINOPRIL 2.5 MG/1
2.5 TABLET ORAL DAILY
Qty: 90 TABLET | Refills: 3 | Status: SHIPPED | OUTPATIENT
Start: 2020-11-25 | End: 2021-02-25

## 2020-11-25 RX ORDER — DILTIAZEM HYDROCHLORIDE 240 MG/1
240 CAPSULE, COATED, EXTENDED RELEASE ORAL DAILY
Qty: 30 CAPSULE | Refills: 2 | Status: SHIPPED | OUTPATIENT
Start: 2020-11-25 | End: 2021-07-19 | Stop reason: SDUPTHER

## 2020-11-25 RX ORDER — PEN NEEDLE, DIABETIC 30 GX3/16"
1 NEEDLE, DISPOSABLE MISCELLANEOUS WEEKLY
Qty: 30 EACH | Refills: 1 | Status: SHIPPED | OUTPATIENT
Start: 2020-11-25 | End: 2021-02-25

## 2020-11-25 RX ORDER — ATORVASTATIN CALCIUM 40 MG/1
40 TABLET, FILM COATED ORAL DAILY
Qty: 90 TABLET | Refills: 1 | Status: SHIPPED | OUTPATIENT
Start: 2020-11-25 | End: 2021-05-31 | Stop reason: CLARIF

## 2020-11-25 RX ORDER — SEMAGLUTIDE 1.34 MG/ML
0.25 INJECTION, SOLUTION SUBCUTANEOUS
Qty: 3 ML | Refills: 0 | Status: SHIPPED | OUTPATIENT
Start: 2020-11-25 | End: 2020-12-23 | Stop reason: SDUPTHER

## 2020-11-25 RX ORDER — PEN NEEDLE, DIABETIC 30 GX3/16"
1 NEEDLE, DISPOSABLE MISCELLANEOUS WEEKLY
COMMUNITY
End: 2020-11-25 | Stop reason: SDUPTHER

## 2020-11-25 NOTE — PROGRESS NOTES
Mirlande Boogie is a 40 y.o. female here for a diabetic eye screening with non-dilated fundus photos.    Patient cooperative?: Yes  Small pupils?: Yes  Last eye exam:     For exam results, see Encounter Report.

## 2020-11-25 NOTE — PATIENT INSTRUCTIONS
CO Q 10 400 mg  Vit E 400 iu    The patient is asked to make an attempt to improve diet and exercise patterns to aid in medical management of this problem.

## 2020-12-23 DIAGNOSIS — E11.9 CONTROLLED TYPE 2 DIABETES MELLITUS WITHOUT COMPLICATION, WITHOUT LONG-TERM CURRENT USE OF INSULIN: ICD-10-CM

## 2020-12-23 RX ORDER — SEMAGLUTIDE 1.34 MG/ML
0.25 INJECTION, SOLUTION SUBCUTANEOUS
Qty: 1.5 ML | Refills: 0 | Status: SHIPPED | OUTPATIENT
Start: 2020-12-23 | End: 2021-02-25

## 2020-12-29 ENCOUNTER — PATIENT MESSAGE (OUTPATIENT)
Dept: FAMILY MEDICINE | Facility: CLINIC | Age: 40
End: 2020-12-29

## 2020-12-31 RX ORDER — AMOXICILLIN AND CLAVULANATE POTASSIUM 875; 125 MG/1; MG/1
1 TABLET, FILM COATED ORAL 2 TIMES DAILY
Qty: 20 TABLET | Refills: 0 | Status: SHIPPED | OUTPATIENT
Start: 2020-12-31 | End: 2021-02-25

## 2021-02-25 ENCOUNTER — OFFICE VISIT (OUTPATIENT)
Dept: FAMILY MEDICINE | Facility: CLINIC | Age: 41
End: 2021-02-25
Payer: COMMERCIAL

## 2021-02-25 VITALS
DIASTOLIC BLOOD PRESSURE: 84 MMHG | RESPIRATION RATE: 18 BRPM | OXYGEN SATURATION: 98 % | HEART RATE: 96 BPM | BODY MASS INDEX: 51.91 KG/M2 | HEIGHT: 63 IN | WEIGHT: 293 LBS | TEMPERATURE: 97 F | SYSTOLIC BLOOD PRESSURE: 146 MMHG

## 2021-02-25 DIAGNOSIS — E11.9 CONTROLLED TYPE 2 DIABETES MELLITUS WITHOUT COMPLICATION, WITHOUT LONG-TERM CURRENT USE OF INSULIN: Primary | ICD-10-CM

## 2021-02-25 DIAGNOSIS — E66.01 MORBID OBESITY: ICD-10-CM

## 2021-02-25 DIAGNOSIS — I10 ESSENTIAL HYPERTENSION: ICD-10-CM

## 2021-02-25 PROCEDURE — 3008F BODY MASS INDEX DOCD: CPT | Mod: S$GLB,,, | Performed by: INTERNAL MEDICINE

## 2021-02-25 PROCEDURE — 1126F AMNT PAIN NOTED NONE PRSNT: CPT | Mod: S$GLB,,, | Performed by: INTERNAL MEDICINE

## 2021-02-25 PROCEDURE — 3008F PR BODY MASS INDEX (BMI) DOCUMENTED: ICD-10-PCS | Mod: S$GLB,,, | Performed by: INTERNAL MEDICINE

## 2021-02-25 PROCEDURE — 3044F HG A1C LEVEL LT 7.0%: CPT | Mod: S$GLB,,, | Performed by: INTERNAL MEDICINE

## 2021-02-25 PROCEDURE — 3077F SYST BP >= 140 MM HG: CPT | Mod: S$GLB,,, | Performed by: INTERNAL MEDICINE

## 2021-02-25 PROCEDURE — 99213 OFFICE O/P EST LOW 20 MIN: CPT | Mod: S$GLB,,, | Performed by: INTERNAL MEDICINE

## 2021-02-25 PROCEDURE — 3044F PR MOST RECENT HEMOGLOBIN A1C LEVEL <7.0%: ICD-10-PCS | Mod: S$GLB,,, | Performed by: INTERNAL MEDICINE

## 2021-02-25 PROCEDURE — 3080F DIAST BP >= 90 MM HG: CPT | Mod: S$GLB,,, | Performed by: INTERNAL MEDICINE

## 2021-02-25 PROCEDURE — 3080F PR MOST RECENT DIASTOLIC BLOOD PRESSURE >= 90 MM HG: ICD-10-PCS | Mod: S$GLB,,, | Performed by: INTERNAL MEDICINE

## 2021-02-25 PROCEDURE — 99213 PR OFFICE/OUTPT VISIT, EST, LEVL III, 20-29 MIN: ICD-10-PCS | Mod: S$GLB,,, | Performed by: INTERNAL MEDICINE

## 2021-02-25 PROCEDURE — 3077F PR MOST RECENT SYSTOLIC BLOOD PRESSURE >= 140 MM HG: ICD-10-PCS | Mod: S$GLB,,, | Performed by: INTERNAL MEDICINE

## 2021-02-25 PROCEDURE — 1126F PR PAIN SEVERITY QUANTIFIED, NO PAIN PRESENT: ICD-10-PCS | Mod: S$GLB,,, | Performed by: INTERNAL MEDICINE

## 2021-02-25 RX ORDER — LISINOPRIL 5 MG/1
5 TABLET ORAL DAILY
Qty: 90 TABLET | Refills: 3 | Status: SHIPPED | OUTPATIENT
Start: 2021-02-25 | End: 2021-07-19

## 2021-02-25 RX ORDER — PEN NEEDLE, DIABETIC 29 G X1/2"
1 NEEDLE, DISPOSABLE MISCELLANEOUS WEEKLY
COMMUNITY
End: 2021-02-25 | Stop reason: SDUPTHER

## 2021-02-25 RX ORDER — PEN NEEDLE, DIABETIC 29 G X1/2"
1 NEEDLE, DISPOSABLE MISCELLANEOUS WEEKLY
Qty: 1 EACH | Refills: 1 | Status: SHIPPED | OUTPATIENT
Start: 2021-02-25

## 2021-02-25 RX ORDER — SEMAGLUTIDE 1.34 MG/ML
INJECTION, SOLUTION SUBCUTANEOUS
Qty: 3 ML | Refills: 0 | Status: SHIPPED | OUTPATIENT
Start: 2021-02-25 | End: 2021-04-14 | Stop reason: SDUPTHER

## 2021-04-14 ENCOUNTER — OFFICE VISIT (OUTPATIENT)
Dept: FAMILY MEDICINE | Facility: CLINIC | Age: 41
End: 2021-04-14
Payer: COMMERCIAL

## 2021-04-14 VITALS
RESPIRATION RATE: 20 BRPM | HEIGHT: 63 IN | WEIGHT: 293 LBS | TEMPERATURE: 99 F | BODY MASS INDEX: 51.91 KG/M2 | DIASTOLIC BLOOD PRESSURE: 78 MMHG | HEART RATE: 86 BPM | SYSTOLIC BLOOD PRESSURE: 136 MMHG | OXYGEN SATURATION: 99 %

## 2021-04-14 DIAGNOSIS — E78.2 MIXED HYPERLIPIDEMIA: ICD-10-CM

## 2021-04-14 DIAGNOSIS — E66.01 MORBID OBESITY: ICD-10-CM

## 2021-04-14 DIAGNOSIS — D50.0 IRON DEFICIENCY ANEMIA DUE TO CHRONIC BLOOD LOSS: ICD-10-CM

## 2021-04-14 DIAGNOSIS — I10 ESSENTIAL HYPERTENSION: ICD-10-CM

## 2021-04-14 DIAGNOSIS — E11.9 CONTROLLED TYPE 2 DIABETES MELLITUS WITHOUT COMPLICATION, WITHOUT LONG-TERM CURRENT USE OF INSULIN: Primary | ICD-10-CM

## 2021-04-14 PROCEDURE — 1126F PR PAIN SEVERITY QUANTIFIED, NO PAIN PRESENT: ICD-10-PCS | Mod: S$GLB,,, | Performed by: INTERNAL MEDICINE

## 2021-04-14 PROCEDURE — 1126F AMNT PAIN NOTED NONE PRSNT: CPT | Mod: S$GLB,,, | Performed by: INTERNAL MEDICINE

## 2021-04-14 PROCEDURE — 3008F BODY MASS INDEX DOCD: CPT | Mod: S$GLB,,, | Performed by: INTERNAL MEDICINE

## 2021-04-14 PROCEDURE — 99214 OFFICE O/P EST MOD 30 MIN: CPT | Mod: S$GLB,,, | Performed by: INTERNAL MEDICINE

## 2021-04-14 PROCEDURE — 99214 PR OFFICE/OUTPT VISIT, EST, LEVL IV, 30-39 MIN: ICD-10-PCS | Mod: S$GLB,,, | Performed by: INTERNAL MEDICINE

## 2021-04-14 PROCEDURE — 3008F PR BODY MASS INDEX (BMI) DOCUMENTED: ICD-10-PCS | Mod: S$GLB,,, | Performed by: INTERNAL MEDICINE

## 2021-04-14 PROCEDURE — 3075F PR MOST RECENT SYSTOLIC BLOOD PRESS GE 130-139MM HG: ICD-10-PCS | Mod: S$GLB,,, | Performed by: INTERNAL MEDICINE

## 2021-04-14 PROCEDURE — 3078F DIAST BP <80 MM HG: CPT | Mod: S$GLB,,, | Performed by: INTERNAL MEDICINE

## 2021-04-14 PROCEDURE — 3078F PR MOST RECENT DIASTOLIC BLOOD PRESSURE < 80 MM HG: ICD-10-PCS | Mod: S$GLB,,, | Performed by: INTERNAL MEDICINE

## 2021-04-14 PROCEDURE — 3075F SYST BP GE 130 - 139MM HG: CPT | Mod: S$GLB,,, | Performed by: INTERNAL MEDICINE

## 2021-04-14 RX ORDER — SEMAGLUTIDE 1.34 MG/ML
INJECTION, SOLUTION SUBCUTANEOUS
Qty: 3 ML | Refills: 0 | Status: SHIPPED | OUTPATIENT
Start: 2021-04-14 | End: 2021-05-10 | Stop reason: SDUPTHER

## 2021-05-06 ENCOUNTER — PATIENT MESSAGE (OUTPATIENT)
Dept: RESEARCH | Facility: HOSPITAL | Age: 41
End: 2021-05-06

## 2021-05-10 DIAGNOSIS — E11.9 CONTROLLED TYPE 2 DIABETES MELLITUS WITHOUT COMPLICATION, WITHOUT LONG-TERM CURRENT USE OF INSULIN: ICD-10-CM

## 2021-05-10 RX ORDER — SEMAGLUTIDE 1.34 MG/ML
0.5 INJECTION, SOLUTION SUBCUTANEOUS WEEKLY
Qty: 3 PEN | Refills: 2 | Status: SHIPPED | OUTPATIENT
Start: 2021-05-10 | End: 2021-07-19 | Stop reason: SDUPTHER

## 2021-06-30 PROBLEM — L98.7 EXCESS SKIN OF ABDOMEN: Status: ACTIVE | Noted: 2021-06-30

## 2021-07-01 PROBLEM — E66.813 CLASS 3 SEVERE OBESITY WITH BODY MASS INDEX (BMI) OF 60.0 TO 69.9 IN ADULT: Status: ACTIVE | Noted: 2018-05-06

## 2021-07-19 ENCOUNTER — OFFICE VISIT (OUTPATIENT)
Dept: FAMILY MEDICINE | Facility: CLINIC | Age: 41
End: 2021-07-19
Payer: COMMERCIAL

## 2021-07-19 VITALS
TEMPERATURE: 99 F | HEART RATE: 82 BPM | BODY MASS INDEX: 51.91 KG/M2 | WEIGHT: 293 LBS | HEIGHT: 63 IN | OXYGEN SATURATION: 98 % | RESPIRATION RATE: 18 BRPM | SYSTOLIC BLOOD PRESSURE: 134 MMHG | DIASTOLIC BLOOD PRESSURE: 86 MMHG

## 2021-07-19 DIAGNOSIS — Z11.4 ENCOUNTER FOR SCREENING FOR HIV: ICD-10-CM

## 2021-07-19 DIAGNOSIS — Z11.59 NEED FOR HEPATITIS C SCREENING TEST: ICD-10-CM

## 2021-07-19 DIAGNOSIS — E11.9 CONTROLLED TYPE 2 DIABETES MELLITUS WITHOUT COMPLICATION, WITHOUT LONG-TERM CURRENT USE OF INSULIN: ICD-10-CM

## 2021-07-19 DIAGNOSIS — E78.2 MIXED HYPERLIPIDEMIA: ICD-10-CM

## 2021-07-19 DIAGNOSIS — I10 ESSENTIAL HYPERTENSION: Primary | ICD-10-CM

## 2021-07-19 PROCEDURE — 3008F BODY MASS INDEX DOCD: CPT | Mod: S$GLB,,, | Performed by: INTERNAL MEDICINE

## 2021-07-19 PROCEDURE — 3044F HG A1C LEVEL LT 7.0%: CPT | Mod: S$GLB,,, | Performed by: INTERNAL MEDICINE

## 2021-07-19 PROCEDURE — 99214 OFFICE O/P EST MOD 30 MIN: CPT | Mod: S$GLB,,, | Performed by: INTERNAL MEDICINE

## 2021-07-19 PROCEDURE — 99214 PR OFFICE/OUTPT VISIT, EST, LEVL IV, 30-39 MIN: ICD-10-PCS | Mod: S$GLB,,, | Performed by: INTERNAL MEDICINE

## 2021-07-19 PROCEDURE — 3008F PR BODY MASS INDEX (BMI) DOCUMENTED: ICD-10-PCS | Mod: S$GLB,,, | Performed by: INTERNAL MEDICINE

## 2021-07-19 PROCEDURE — 3079F DIAST BP 80-89 MM HG: CPT | Mod: S$GLB,,, | Performed by: INTERNAL MEDICINE

## 2021-07-19 PROCEDURE — 3075F PR MOST RECENT SYSTOLIC BLOOD PRESS GE 130-139MM HG: ICD-10-PCS | Mod: S$GLB,,, | Performed by: INTERNAL MEDICINE

## 2021-07-19 PROCEDURE — 3044F PR MOST RECENT HEMOGLOBIN A1C LEVEL <7.0%: ICD-10-PCS | Mod: S$GLB,,, | Performed by: INTERNAL MEDICINE

## 2021-07-19 PROCEDURE — 3079F PR MOST RECENT DIASTOLIC BLOOD PRESSURE 80-89 MM HG: ICD-10-PCS | Mod: S$GLB,,, | Performed by: INTERNAL MEDICINE

## 2021-07-19 PROCEDURE — 3075F SYST BP GE 130 - 139MM HG: CPT | Mod: S$GLB,,, | Performed by: INTERNAL MEDICINE

## 2021-07-19 RX ORDER — DILTIAZEM HYDROCHLORIDE 240 MG/1
240 CAPSULE, COATED, EXTENDED RELEASE ORAL DAILY
Qty: 30 CAPSULE | Refills: 2 | Status: SHIPPED | OUTPATIENT
Start: 2021-07-19 | End: 2022-04-21 | Stop reason: SDUPTHER

## 2021-07-19 RX ORDER — OXYCODONE AND ACETAMINOPHEN 7.5; 325 MG/1; MG/1
1 TABLET ORAL
COMMUNITY
Start: 2021-07-09 | End: 2022-04-21

## 2021-07-19 RX ORDER — SEMAGLUTIDE 1.34 MG/ML
0.5 INJECTION, SOLUTION SUBCUTANEOUS WEEKLY
Qty: 3 PEN | Refills: 2 | Status: SHIPPED | OUTPATIENT
Start: 2021-07-19 | End: 2022-04-21 | Stop reason: SDUPTHER

## 2021-07-20 ENCOUNTER — PATIENT MESSAGE (OUTPATIENT)
Dept: FAMILY MEDICINE | Facility: CLINIC | Age: 41
End: 2021-07-20

## 2021-07-20 RX ORDER — ATORVASTATIN CALCIUM 40 MG/1
40 TABLET, FILM COATED ORAL DAILY
Qty: 90 TABLET | Refills: 1 | Status: SHIPPED | OUTPATIENT
Start: 2021-07-20 | End: 2022-04-21

## 2021-08-02 RX ORDER — ATORVASTATIN CALCIUM 40 MG/1
40 TABLET, FILM COATED ORAL DAILY
Qty: 90 TABLET | Refills: 1 | Status: SHIPPED | OUTPATIENT
Start: 2021-08-02 | End: 2022-04-21

## 2021-10-19 ENCOUNTER — TELEPHONE (OUTPATIENT)
Dept: FAMILY MEDICINE | Facility: CLINIC | Age: 41
End: 2021-10-19
Payer: COMMERCIAL

## 2021-10-20 ENCOUNTER — OFFICE VISIT (OUTPATIENT)
Dept: FAMILY MEDICINE | Facility: CLINIC | Age: 41
End: 2021-10-20
Payer: COMMERCIAL

## 2021-10-20 ENCOUNTER — PATIENT MESSAGE (OUTPATIENT)
Dept: FAMILY MEDICINE | Facility: CLINIC | Age: 41
End: 2021-10-20

## 2021-10-20 DIAGNOSIS — I10 ESSENTIAL HYPERTENSION: ICD-10-CM

## 2021-10-20 DIAGNOSIS — U07.1 COVID: ICD-10-CM

## 2021-10-20 DIAGNOSIS — E66.01 MORBID OBESITY: Primary | ICD-10-CM

## 2021-10-20 PROBLEM — E11.9 CONTROLLED TYPE 2 DIABETES MELLITUS WITHOUT COMPLICATION, WITHOUT LONG-TERM CURRENT USE OF INSULIN: Status: RESOLVED | Noted: 2018-05-06 | Resolved: 2021-10-20

## 2021-10-20 PROCEDURE — 3044F HG A1C LEVEL LT 7.0%: CPT | Mod: 95,,, | Performed by: INTERNAL MEDICINE

## 2021-10-20 PROCEDURE — 4010F ACE/ARB THERAPY RXD/TAKEN: CPT | Mod: 95,,, | Performed by: INTERNAL MEDICINE

## 2021-10-20 PROCEDURE — 1160F RVW MEDS BY RX/DR IN RCRD: CPT | Mod: 95,,, | Performed by: INTERNAL MEDICINE

## 2021-10-20 PROCEDURE — 99213 PR OFFICE/OUTPT VISIT, EST, LEVL III, 20-29 MIN: ICD-10-PCS | Mod: 95,,, | Performed by: INTERNAL MEDICINE

## 2021-10-20 PROCEDURE — 99213 OFFICE O/P EST LOW 20 MIN: CPT | Mod: 95,,, | Performed by: INTERNAL MEDICINE

## 2021-10-20 PROCEDURE — 4010F PR ACE/ARB THEARPY RXD/TAKEN: ICD-10-PCS | Mod: 95,,, | Performed by: INTERNAL MEDICINE

## 2021-10-20 PROCEDURE — 1160F PR REVIEW ALL MEDS BY PRESCRIBER/CLIN PHARMACIST DOCUMENTED: ICD-10-PCS | Mod: 95,,, | Performed by: INTERNAL MEDICINE

## 2021-10-20 PROCEDURE — 3044F PR MOST RECENT HEMOGLOBIN A1C LEVEL <7.0%: ICD-10-PCS | Mod: 95,,, | Performed by: INTERNAL MEDICINE

## 2021-10-22 ENCOUNTER — PATIENT MESSAGE (OUTPATIENT)
Dept: FAMILY MEDICINE | Facility: CLINIC | Age: 41
End: 2021-10-22
Payer: COMMERCIAL

## 2021-10-27 ENCOUNTER — PATIENT MESSAGE (OUTPATIENT)
Dept: FAMILY MEDICINE | Facility: CLINIC | Age: 41
End: 2021-10-27
Payer: COMMERCIAL

## 2021-10-27 RX ORDER — AMOXICILLIN AND CLAVULANATE POTASSIUM 875; 125 MG/1; MG/1
1 TABLET, FILM COATED ORAL 2 TIMES DAILY
Qty: 20 TABLET | Refills: 0 | Status: SHIPPED | OUTPATIENT
Start: 2021-10-27 | End: 2022-04-21

## 2021-12-15 ENCOUNTER — PATIENT MESSAGE (OUTPATIENT)
Dept: FAMILY MEDICINE | Facility: CLINIC | Age: 41
End: 2021-12-15
Payer: COMMERCIAL

## 2022-01-05 ENCOUNTER — PATIENT MESSAGE (OUTPATIENT)
Dept: FAMILY MEDICINE | Facility: CLINIC | Age: 42
End: 2022-01-05
Payer: COMMERCIAL

## 2022-04-21 ENCOUNTER — OFFICE VISIT (OUTPATIENT)
Dept: FAMILY MEDICINE | Facility: CLINIC | Age: 42
End: 2022-04-21
Payer: COMMERCIAL

## 2022-04-21 VITALS
DIASTOLIC BLOOD PRESSURE: 82 MMHG | TEMPERATURE: 99 F | BODY MASS INDEX: 51.91 KG/M2 | WEIGHT: 293 LBS | SYSTOLIC BLOOD PRESSURE: 124 MMHG | OXYGEN SATURATION: 97 % | HEART RATE: 101 BPM | HEIGHT: 63 IN

## 2022-04-21 DIAGNOSIS — I10 ESSENTIAL HYPERTENSION: ICD-10-CM

## 2022-04-21 DIAGNOSIS — L30.1 DYSHIDROTIC DERMATITIS: ICD-10-CM

## 2022-04-21 DIAGNOSIS — E11.9 CONTROLLED TYPE 2 DIABETES MELLITUS WITHOUT COMPLICATION, WITHOUT LONG-TERM CURRENT USE OF INSULIN: Primary | ICD-10-CM

## 2022-04-21 DIAGNOSIS — Z12.31 ENCOUNTER FOR SCREENING MAMMOGRAM FOR MALIGNANT NEOPLASM OF BREAST: ICD-10-CM

## 2022-04-21 PROCEDURE — 3079F PR MOST RECENT DIASTOLIC BLOOD PRESSURE 80-89 MM HG: ICD-10-PCS | Mod: S$GLB,,, | Performed by: NURSE PRACTITIONER

## 2022-04-21 PROCEDURE — 3074F SYST BP LT 130 MM HG: CPT | Mod: S$GLB,,, | Performed by: NURSE PRACTITIONER

## 2022-04-21 PROCEDURE — 3074F PR MOST RECENT SYSTOLIC BLOOD PRESSURE < 130 MM HG: ICD-10-PCS | Mod: S$GLB,,, | Performed by: NURSE PRACTITIONER

## 2022-04-21 PROCEDURE — 1160F RVW MEDS BY RX/DR IN RCRD: CPT | Mod: S$GLB,,, | Performed by: NURSE PRACTITIONER

## 2022-04-21 PROCEDURE — 99214 OFFICE O/P EST MOD 30 MIN: CPT | Mod: S$GLB,,, | Performed by: NURSE PRACTITIONER

## 2022-04-21 PROCEDURE — 99214 PR OFFICE/OUTPT VISIT, EST, LEVL IV, 30-39 MIN: ICD-10-PCS | Mod: S$GLB,,, | Performed by: NURSE PRACTITIONER

## 2022-04-21 PROCEDURE — 1160F PR REVIEW ALL MEDS BY PRESCRIBER/CLIN PHARMACIST DOCUMENTED: ICD-10-PCS | Mod: S$GLB,,, | Performed by: NURSE PRACTITIONER

## 2022-04-21 PROCEDURE — 3008F BODY MASS INDEX DOCD: CPT | Mod: S$GLB,,, | Performed by: NURSE PRACTITIONER

## 2022-04-21 PROCEDURE — 3008F PR BODY MASS INDEX (BMI) DOCUMENTED: ICD-10-PCS | Mod: S$GLB,,, | Performed by: NURSE PRACTITIONER

## 2022-04-21 PROCEDURE — 3079F DIAST BP 80-89 MM HG: CPT | Mod: S$GLB,,, | Performed by: NURSE PRACTITIONER

## 2022-04-21 RX ORDER — SEMAGLUTIDE 1.34 MG/ML
0.5 INJECTION, SOLUTION SUBCUTANEOUS WEEKLY
Qty: 3 PEN | Refills: 1 | Status: SHIPPED | OUTPATIENT
Start: 2022-04-21

## 2022-04-21 RX ORDER — DILTIAZEM HYDROCHLORIDE 240 MG/1
240 CAPSULE, COATED, EXTENDED RELEASE ORAL DAILY
Qty: 90 CAPSULE | Refills: 1 | Status: SHIPPED | OUTPATIENT
Start: 2022-04-21

## 2022-04-21 RX ORDER — BETAMETHASONE DIPROPIONATE 0.5 MG/G
CREAM TOPICAL 2 TIMES DAILY
Qty: 15 G | Refills: 0 | Status: SHIPPED | OUTPATIENT
Start: 2022-04-21

## 2022-04-21 NOTE — PROGRESS NOTES
SUBJECTIVE:      Patient ID: Mirlande Boogie is a 41 y.o. female.    Chief Complaint: Medication Refill (Medication Refill and Handicap Form Renewal)    Pr of Dr. Rainey, known to this provider, presents for chronic med refills & temporary handicap car placard renewal    Discussed outstanding health maintenance     Hypertension  This is a chronic problem. The current episode started more than 1 month ago. The problem is controlled. Pertinent negatives include no chest pain, neck pain, palpitations or shortness of breath. Risk factors for coronary artery disease include dyslipidemia, obesity and sedentary lifestyle. Past treatments include calcium channel blockers. The current treatment provides moderate improvement. Compliance problems include diet and exercise.  Identifiable causes of hypertension include sleep apnea.   Diabetes  She presents for her follow-up diabetic visit. She has type 2 diabetes mellitus. No MedicAlert identification noted. Pertinent negatives for hypoglycemia include no confusion, dizziness, nervousness/anxiousness or pallor. Pertinent negatives for diabetes include no chest pain, no fatigue, no polydipsia, no polyuria and no weakness. There are no hypoglycemic complications. Symptoms are stable. Risk factors for coronary artery disease include diabetes mellitus, dyslipidemia, obesity, sedentary lifestyle and hypertension. Current diabetic treatments: Ozempic. She is compliant with treatment all of the time. She has not had a previous visit with a dietitian. She rarely participates in exercise. An ACE inhibitor/angiotensin II receptor blocker is not being taken. Eye exam is current.   Rash  This is a recurrent problem. The current episode started more than 1 month ago. The problem has been waxing and waning since onset. The affected locations include the left hand and right hand. The rash is characterized by dryness, peeling and scaling. It is unknown if there was an exposure to a  precipitant. Pertinent negatives include no congestion, cough, diarrhea, eye pain, fatigue, shortness of breath, sore throat or vomiting. Past treatments include nothing. Her past medical history is significant for allergies.       Past Surgical History:   Procedure Laterality Date    BREAST SURGERY       SECTION      HERNIA REPAIR      [id number]    PANNICULECTOMY N/A 2021    Procedure: PANNICULECTOMY;  Surgeon: Alessandro Ramírez MD;  Location: [facility];  Service: Plastics;  Laterality: N/A;    WISDOM TOOTH EXTRACTION      all 4 removed     Family History   Problem Relation Age of Onset    Pulmonary fibrosis Mother     Early death Father 64      Social History     Socioeconomic History    Marital status:    Tobacco Use    Smoking status: Never Smoker    Smokeless tobacco: Never Used   Substance and Sexual Activity    Alcohol use: No    Drug use: No    Sexual activity: Never     Social Determinants of Health     Financial Resource Strain: Medium Risk    Difficulty of Paying Living Expenses: Somewhat hard   Food Insecurity: Food Insecurity Present    Worried About Running Out of Food in the Last Year: Sometimes true    Ran Out of Food in the Last Year: Never true   Transportation Needs: No Transportation Needs    Lack of Transportation (Medical): No    Lack of Transportation (Non-Medical): No   Physical Activity: Insufficiently Active    Days of Exercise per Week: 2 days    Minutes of Exercise per Session: 30 min   Stress: Stress Concern Present    Feeling of Stress : To some extent   Social Connections: Unknown    Frequency of Communication with Friends and Family: More than three times a week    Frequency of Social Gatherings with Friends and Family: Once a week    Active Member of Clubs or Organizations: Yes    Attends Club or Organization Meetings: More than 4 times per year    Marital Status:    Housing Stability: High Risk    Unable to Pay for Housing in  "the Last Year: Yes    Number of Places Lived in the Last Year: 1    Unstable Housing in the Last Year: No     Current Outpatient Medications   Medication Sig Dispense Refill    betamethasone dipropionate 0.05 % cream Apply topically 2 (two) times daily. 15 g 0    diltiaZEM (CARDIZEM CD) 240 MG 24 hr capsule Take 1 capsule (240 mg total) by mouth once daily. 90 capsule 1    pen needle, diabetic (PEN NEEDLE) 31 gauge x 1/4" Ndle 1 each by Misc.(Non-Drug; Combo Route) route once a week. Use with ozemoic 1 each 1    semaglutide (OZEMPIC) 0.25 mg or 0.5 mg(2 mg/1.5 mL) pen injector Inject 0.5 mg into the skin once a week. 3 pen 1     No current facility-administered medications for this visit.     Review of patient's allergies indicates:   Allergen Reactions    Anesthetics - jake type- parabens Other (See Comments)    Codeine      sensitive      Past Medical History:   Diagnosis Date    Anemia     Back pain     CHO intolerance     Elevated BP without diagnosis of hypertension 2018    Excessive daytime sleepiness     General anesthetics causing adverse effect in therapeutic use     highly sensitive    Hypothyroidism     Hypothyroidism     Morbid obesity 2018    Obstructive sleep apnea 2019    PONV (postoperative nausea and vomiting)     Ventral hernia      Past Surgical History:   Procedure Laterality Date    BREAST SURGERY       SECTION      HERNIA REPAIR      [id number]    PANNICULECTOMY N/A 2021    Procedure: PANNICULECTOMY;  Surgeon: Alessandro Ramírez MD;  Location: [facility];  Service: Plastics;  Laterality: N/A;    WISDOM TOOTH EXTRACTION      all 4 removed       Review of Systems   Constitutional: Negative for activity change, appetite change, fatigue and unexpected weight change.   HENT: Negative for congestion, ear pain, hearing loss, postnasal drip, sinus pressure, sinus pain, sneezing and sore throat.    Eyes: Negative for photophobia and pain.   Respiratory: " "Negative for cough, chest tightness, shortness of breath and wheezing.    Cardiovascular: Negative for chest pain, palpitations and leg swelling.   Gastrointestinal: Negative for abdominal distention, abdominal pain, blood in stool, constipation, diarrhea, nausea and vomiting.   Endocrine: Negative for cold intolerance, heat intolerance, polydipsia and polyuria.   Genitourinary: Negative for difficulty urinating, dysuria, flank pain, frequency, hematuria, pelvic pain and urgency.   Musculoskeletal: Negative for arthralgias, back pain, joint swelling, myalgias and neck pain.   Skin: Positive for rash. Negative for pallor.   Allergic/Immunologic: Negative for environmental allergies and food allergies.   Neurological: Negative for dizziness, weakness, light-headedness and numbness.   Hematological: Does not bruise/bleed easily.   Psychiatric/Behavioral: Negative for agitation, confusion, decreased concentration and sleep disturbance. The patient is not nervous/anxious.       OBJECTIVE:      Vitals:    04/21/22 1433   BP: 124/82   BP Location: Right arm   Patient Position: Sitting   BP Method: Large (Manual)   Pulse: 101   Temp: 99.3 °F (37.4 °C)   TempSrc: Oral   SpO2: 97%   Weight: (!) 155.3 kg (342 lb 6.4 oz)   Height: 5' 3" (1.6 m)     Physical Exam  Vitals and nursing note reviewed.   Constitutional:       General: She is not in acute distress.     Appearance: Normal appearance. She is well-developed and well-groomed. She is morbidly obese.   HENT:      Head: Normocephalic and atraumatic.      Right Ear: Hearing normal.      Left Ear: Hearing normal.      Nose: Nose normal. No rhinorrhea.   Eyes:      General: Lids are normal.         Right eye: No discharge.         Left eye: No discharge.      Conjunctiva/sclera: Conjunctivae normal.      Right eye: Right conjunctiva is not injected.      Left eye: Left conjunctiva is not injected.      Pupils: Pupils are equal, round, and reactive to light. Pupils are equal.    "   Right eye: Pupil is round and reactive.      Left eye: Pupil is round and reactive.   Neck:      Thyroid: No thyromegaly.      Vascular: No JVD.      Trachea: Trachea normal. No tracheal deviation.   Cardiovascular:      Rate and Rhythm: Normal rate and regular rhythm.      Pulses:           Radial pulses are 2+ on the right side and 2+ on the left side.      Heart sounds: Normal heart sounds. No murmur heard.    No friction rub. No gallop.   Pulmonary:      Effort: Pulmonary effort is normal. No respiratory distress.      Breath sounds: Normal breath sounds. No stridor. No decreased breath sounds, wheezing, rhonchi or rales.   Abdominal:      General: Bowel sounds are normal. There is no distension.      Palpations: Abdomen is soft. Abdomen is not rigid.      Tenderness: There is no abdominal tenderness. There is no guarding.   Musculoskeletal:         General: Normal range of motion.      Cervical back: Normal range of motion and neck supple.   Lymphadenopathy:      Cervical: No cervical adenopathy.   Skin:     General: Skin is warm and dry.      Capillary Refill: Capillary refill takes less than 2 seconds.      Coloration: Skin is not pale.      Findings: No lesion or rash.      Comments: Dyshidrosis noted to L palm   Neurological:      Mental Status: She is alert and oriented to person, place, and time.      Motor: No atrophy.      Coordination: Coordination normal.      Gait: Gait normal.   Psychiatric:         Attention and Perception: She is attentive.         Speech: Speech normal.         Behavior: Behavior normal.         Thought Content: Thought content normal.         Judgment: Judgment normal.        Assessment:       1. Controlled type 2 diabetes mellitus without complication, without long-term current use of insulin    2. Essential hypertension    3. Dyshidrotic dermatitis    4. Encounter for screening mammogram for malignant neoplasm of breast        Plan:       Controlled type 2 diabetes mellitus  without complication, without long-term current use of insulin  -     semaglutide (OZEMPIC) 0.25 mg or 0.5 mg(2 mg/1.5 mL) pen injector; Inject 0.5 mg into the skin once a week.  Dispense: 3 pen; Refill: 1  -     Comprehensive Metabolic Panel; Future; Expected date: 04/21/2022  -     Lipid Panel; Future; Expected date: 04/21/2022  -     TSH; Future; Expected date: 04/21/2022  -     Hemoglobin A1C; Future; Expected date: 04/21/2022  -     Microalbumin/Creatinine Ratio, Urine; Future; Expected date: 04/21/2022    Essential hypertension  -     diltiaZEM (CARDIZEM CD) 240 MG 24 hr capsule; Take 1 capsule (240 mg total) by mouth once daily.  Dispense: 90 capsule; Refill: 1  -     CBC Auto Differential; Future; Expected date: 04/28/2022  -     Comprehensive Metabolic Panel; Future; Expected date: 04/21/2022  -     Lipid Panel; Future; Expected date: 04/21/2022  -     TSH; Future; Expected date: 04/21/2022    Dyshidrotic dermatitis  -     betamethasone dipropionate 0.05 % cream; Apply topically 2 (two) times daily.  Dispense: 15 g; Refill: 0    Encounter for screening mammogram for malignant neoplasm of breast  -     Mammo Digital Screening Bilat w/ Gumaro; Future; Expected date: 04/21/2022            Follow up in about 6 months (around 10/21/2022) for DM.      4/21/2022 VICKIE Bruno, FNP-C

## 2022-10-17 ENCOUNTER — PATIENT MESSAGE (OUTPATIENT)
Dept: FAMILY MEDICINE | Facility: CLINIC | Age: 42
End: 2022-10-17

## 2025-03-15 ENCOUNTER — HOSPITAL ENCOUNTER (INPATIENT)
Facility: HOSPITAL | Age: 45
LOS: 1 days | Discharge: HOME | DRG: 872 | End: 2025-03-19
Attending: EMERGENCY MEDICINE | Admitting: INTERNAL MEDICINE
Payer: COMMERCIAL

## 2025-03-15 DIAGNOSIS — R19.7 DIARRHEA, UNSPECIFIED TYPE: ICD-10-CM

## 2025-03-15 DIAGNOSIS — N83.209 CYST OF OVARY, UNSPECIFIED LATERALITY: ICD-10-CM

## 2025-03-15 DIAGNOSIS — K66.8 MESENTERIC CYST: ICD-10-CM

## 2025-03-15 DIAGNOSIS — R73.9 HYPERGLYCEMIA: ICD-10-CM

## 2025-03-15 DIAGNOSIS — D64.9 SYMPTOMATIC ANEMIA: Primary | ICD-10-CM

## 2025-03-15 DIAGNOSIS — R07.9 CHEST PAIN: ICD-10-CM

## 2025-03-15 PROBLEM — R19.5 OCCULT BLOOD IN STOOLS: Status: ACTIVE | Noted: 2025-03-15

## 2025-03-15 LAB
ABO + RH BLD: NORMAL
ALBUMIN SERPL BCP-MCNC: 3.8 G/DL (ref 3.5–5.2)
ALLENS TEST: ABNORMAL
ALP SERPL-CCNC: 68 U/L (ref 40–150)
ALT SERPL W/O P-5'-P-CCNC: 8 U/L (ref 10–44)
ANION GAP SERPL CALC-SCNC: 10 MMOL/L (ref 8–16)
ANISOCYTOSIS BLD QL SMEAR: ABNORMAL
AST SERPL-CCNC: 22 U/L (ref 10–40)
B-OH-BUTYR BLD STRIP-SCNC: 0.2 MMOL/L (ref 0–0.5)
BASOPHILS # BLD AUTO: ABNORMAL K/UL (ref 0–0.2)
BASOPHILS NFR BLD: 0 % (ref 0–1.9)
BILIRUB SERPL-MCNC: 0.6 MG/DL (ref 0.1–1)
BILIRUB UR QL STRIP: NEGATIVE
BLD GP AB SCN CELLS X3 SERPL QL: NORMAL
BUN SERPL-MCNC: 10 MG/DL (ref 6–20)
CALCIUM SERPL-MCNC: 8.9 MG/DL (ref 8.7–10.5)
CHLORIDE SERPL-SCNC: 106 MMOL/L (ref 95–110)
CLARITY UR: CLEAR
CO2 SERPL-SCNC: 22 MMOL/L (ref 23–29)
COLOR UR: YELLOW
CREAT SERPL-MCNC: 0.8 MG/DL (ref 0.5–1.4)
DELSYS: ABNORMAL
DIFFERENTIAL METHOD BLD: ABNORMAL
EOSINOPHIL # BLD AUTO: ABNORMAL K/UL (ref 0–0.5)
EOSINOPHIL NFR BLD: 0 % (ref 0–8)
ERYTHROCYTE [DISTWIDTH] IN BLOOD BY AUTOMATED COUNT: 24.2 % (ref 11.5–14.5)
EST. GFR  (NO RACE VARIABLE): >60 ML/MIN/1.73 M^2
FERRITIN SERPL-MCNC: 3 NG/ML (ref 20–300)
GLUCOSE SERPL-MCNC: 113 MG/DL (ref 70–110)
GLUCOSE UR QL STRIP: NEGATIVE
HCO3 UR-SCNC: 26.7 MMOL/L (ref 24–28)
HCT VFR BLD AUTO: 27.2 % (ref 37–48.5)
HGB BLD-MCNC: 6.9 G/DL (ref 12–16)
HGB UR QL STRIP: NEGATIVE
HYPOCHROMIA BLD QL SMEAR: ABNORMAL
IMM GRANULOCYTES # BLD AUTO: ABNORMAL K/UL (ref 0–0.04)
IMM GRANULOCYTES NFR BLD AUTO: ABNORMAL % (ref 0–0.5)
KETONES UR QL STRIP: NEGATIVE
LDH SERPL L TO P-CCNC: 1.37 MMOL/L (ref 0.5–2.2)
LDH SERPL L TO P-CCNC: 199 U/L (ref 110–260)
LEUKOCYTE ESTERASE UR QL STRIP: NEGATIVE
LIPASE SERPL-CCNC: 31 U/L (ref 4–60)
LYMPHOCYTES # BLD AUTO: ABNORMAL K/UL (ref 1–4.8)
LYMPHOCYTES NFR BLD: 9 % (ref 18–48)
MCH RBC QN AUTO: 15 PG (ref 27–31)
MCHC RBC AUTO-ENTMCNC: 25.4 G/DL (ref 32–36)
MCV RBC AUTO: 59 FL (ref 82–98)
MONOCYTES # BLD AUTO: ABNORMAL K/UL (ref 0.3–1)
MONOCYTES NFR BLD: 3 % (ref 4–15)
NEUTROPHILS NFR BLD: 84 % (ref 38–73)
NEUTS BAND NFR BLD MANUAL: 4 %
NITRITE UR QL STRIP: NEGATIVE
NRBC BLD-RTO: 0 /100 WBC
OB PNL STL: POSITIVE
OVALOCYTES BLD QL SMEAR: ABNORMAL
PCO2 BLDA: 44.1 MMHG (ref 35–45)
PH SMN: 7.39 [PH] (ref 7.35–7.45)
PH UR STRIP: 7 [PH] (ref 5–8)
PLATELET # BLD AUTO: 426 K/UL (ref 150–450)
PLATELET BLD QL SMEAR: ABNORMAL
PMV BLD AUTO: ABNORMAL FL (ref 9.2–12.9)
PO2 BLDA: 23 MMHG (ref 40–60)
POC BE: 2 MMOL/L
POC SATURATED O2: 39 % (ref 95–100)
POC TCO2: 28 MMOL/L (ref 24–29)
POCT GLUCOSE: 121 MG/DL (ref 70–110)
POIKILOCYTOSIS BLD QL SMEAR: SLIGHT
POTASSIUM SERPL-SCNC: 3.8 MMOL/L (ref 3.5–5.1)
PROT SERPL-MCNC: 8.7 G/DL (ref 6–8.4)
PROT UR QL STRIP: ABNORMAL
RBC # BLD AUTO: 4.59 M/UL (ref 4–5.4)
RV AG STL QL IA.RAPID: NEGATIVE
SAMPLE: ABNORMAL
SITE: ABNORMAL
SODIUM SERPL-SCNC: 138 MMOL/L (ref 136–145)
SP GR UR STRIP: >1.03 (ref 1–1.03)
SPECIMEN OUTDATE: NORMAL
URN SPEC COLLECT METH UR: ABNORMAL
UROBILINOGEN UR STRIP-ACNC: NEGATIVE EU/DL
WBC # BLD AUTO: 15.6 K/UL (ref 3.9–12.7)

## 2025-03-15 PROCEDURE — 25500020 PHARM REV CODE 255

## 2025-03-15 PROCEDURE — 82962 GLUCOSE BLOOD TEST: CPT

## 2025-03-15 PROCEDURE — 83690 ASSAY OF LIPASE: CPT | Performed by: EMERGENCY MEDICINE

## 2025-03-15 PROCEDURE — 83615 LACTATE (LD) (LDH) ENZYME: CPT | Performed by: EMERGENCY MEDICINE

## 2025-03-15 PROCEDURE — 85027 COMPLETE CBC AUTOMATED: CPT | Performed by: EMERGENCY MEDICINE

## 2025-03-15 PROCEDURE — 94760 N-INVAS EAR/PLS OXIMETRY 1: CPT | Mod: XB

## 2025-03-15 PROCEDURE — 96365 THER/PROPH/DIAG IV INF INIT: CPT

## 2025-03-15 PROCEDURE — 96375 TX/PRO/DX INJ NEW DRUG ADDON: CPT

## 2025-03-15 PROCEDURE — 83540 ASSAY OF IRON: CPT | Performed by: EMERGENCY MEDICINE

## 2025-03-15 PROCEDURE — 87045 FECES CULTURE AEROBIC BACT: CPT | Performed by: EMERGENCY MEDICINE

## 2025-03-15 PROCEDURE — 82728 ASSAY OF FERRITIN: CPT | Performed by: EMERGENCY MEDICINE

## 2025-03-15 PROCEDURE — 99285 EMERGENCY DEPT VISIT HI MDM: CPT | Mod: 25

## 2025-03-15 PROCEDURE — 87425 ROTAVIRUS AG IA: CPT | Performed by: EMERGENCY MEDICINE

## 2025-03-15 PROCEDURE — 99900035 HC TECH TIME PER 15 MIN (STAT)

## 2025-03-15 PROCEDURE — 80053 COMPREHEN METABOLIC PANEL: CPT | Performed by: EMERGENCY MEDICINE

## 2025-03-15 PROCEDURE — 87427 SHIGA-LIKE TOXIN AG IA: CPT

## 2025-03-15 PROCEDURE — 82803 BLOOD GASES ANY COMBINATION: CPT

## 2025-03-15 PROCEDURE — 83605 ASSAY OF LACTIC ACID: CPT

## 2025-03-15 PROCEDURE — G0378 HOSPITAL OBSERVATION PER HR: HCPCS

## 2025-03-15 PROCEDURE — 82010 KETONE BODYS QUAN: CPT | Performed by: EMERGENCY MEDICINE

## 2025-03-15 PROCEDURE — 86920 COMPATIBILITY TEST SPIN: CPT | Performed by: EMERGENCY MEDICINE

## 2025-03-15 PROCEDURE — 93005 ELECTROCARDIOGRAM TRACING: CPT

## 2025-03-15 PROCEDURE — 87040 BLOOD CULTURE FOR BACTERIA: CPT | Performed by: EMERGENCY MEDICINE

## 2025-03-15 PROCEDURE — 85007 BL SMEAR W/DIFF WBC COUNT: CPT | Performed by: EMERGENCY MEDICINE

## 2025-03-15 PROCEDURE — 30233N1 TRANSFUSION OF NONAUTOLOGOUS RED BLOOD CELLS INTO PERIPHERAL VEIN, PERCUTANEOUS APPROACH: ICD-10-PCS | Performed by: INTERNAL MEDICINE

## 2025-03-15 PROCEDURE — 81003 URINALYSIS AUTO W/O SCOPE: CPT | Performed by: EMERGENCY MEDICINE

## 2025-03-15 PROCEDURE — 36415 COLL VENOUS BLD VENIPUNCTURE: CPT | Performed by: EMERGENCY MEDICINE

## 2025-03-15 PROCEDURE — 93010 ELECTROCARDIOGRAM REPORT: CPT | Mod: ,,, | Performed by: GENERAL PRACTICE

## 2025-03-15 PROCEDURE — 63600175 PHARM REV CODE 636 W HCPCS: Performed by: EMERGENCY MEDICINE

## 2025-03-15 PROCEDURE — 82272 OCCULT BLD FECES 1-3 TESTS: CPT | Performed by: EMERGENCY MEDICINE

## 2025-03-15 PROCEDURE — 89055 LEUKOCYTE ASSESSMENT FECAL: CPT | Performed by: EMERGENCY MEDICINE

## 2025-03-15 PROCEDURE — 25000003 PHARM REV CODE 250: Performed by: EMERGENCY MEDICINE

## 2025-03-15 PROCEDURE — 96361 HYDRATE IV INFUSION ADD-ON: CPT

## 2025-03-15 PROCEDURE — 87449 NOS EACH ORGANISM AG IA: CPT | Performed by: EMERGENCY MEDICINE

## 2025-03-15 PROCEDURE — 87046 STOOL CULTR AEROBIC BACT EA: CPT

## 2025-03-15 PROCEDURE — 86850 RBC ANTIBODY SCREEN: CPT | Performed by: EMERGENCY MEDICINE

## 2025-03-15 RX ORDER — ONDANSETRON HYDROCHLORIDE 2 MG/ML
4 INJECTION, SOLUTION INTRAVENOUS
Status: COMPLETED | OUTPATIENT
Start: 2025-03-15 | End: 2025-03-15

## 2025-03-15 RX ORDER — HYDROCODONE BITARTRATE AND ACETAMINOPHEN 500; 5 MG/1; MG/1
TABLET ORAL
Status: DISCONTINUED | OUTPATIENT
Start: 2025-03-15 | End: 2025-03-19 | Stop reason: HOSPADM

## 2025-03-15 RX ADMIN — SODIUM CHLORIDE 1000 ML: 9 INJECTION, SOLUTION INTRAVENOUS at 08:03

## 2025-03-15 RX ADMIN — PIPERACILLIN SODIUM AND TAZOBACTAM SODIUM 4.5 G: 4; .5 INJECTION, POWDER, LYOPHILIZED, FOR SOLUTION INTRAVENOUS at 11:03

## 2025-03-15 RX ADMIN — ONDANSETRON 4 MG: 2 INJECTION INTRAMUSCULAR; INTRAVENOUS at 08:03

## 2025-03-15 RX ADMIN — IOHEXOL 100 ML: 350 INJECTION, SOLUTION INTRAVENOUS at 09:03

## 2025-03-16 PROBLEM — K92.2 GI BLEED: Status: ACTIVE | Noted: 2025-03-16

## 2025-03-16 PROBLEM — N83.209 OVARIAN CYST: Status: ACTIVE | Noted: 2025-03-16

## 2025-03-16 PROBLEM — A41.9 SEPSIS: Status: ACTIVE | Noted: 2025-03-16

## 2025-03-16 PROBLEM — K66.8 MESENTERIC CYST: Status: ACTIVE | Noted: 2025-03-16

## 2025-03-16 LAB
25(OH)D3+25(OH)D2 SERPL-MCNC: 22 NG/ML (ref 30–96)
ALBUMIN SERPL BCP-MCNC: 3.4 G/DL (ref 3.5–5.2)
ALP SERPL-CCNC: 67 U/L (ref 40–150)
ALT SERPL W/O P-5'-P-CCNC: 7 U/L (ref 10–44)
ANION GAP SERPL CALC-SCNC: 8 MMOL/L (ref 8–16)
ANISOCYTOSIS BLD QL SMEAR: ABNORMAL
AST SERPL-CCNC: 19 U/L (ref 10–40)
BASOPHILS # BLD AUTO: 0.06 K/UL (ref 0–0.2)
BASOPHILS # BLD AUTO: ABNORMAL K/UL (ref 0–0.2)
BASOPHILS NFR BLD: 0 % (ref 0–1.9)
BASOPHILS NFR BLD: 0 % (ref 0–1.9)
BASOPHILS NFR BLD: 0.4 % (ref 0–1.9)
BILIRUB DIRECT SERPL-MCNC: 0.6 MG/DL (ref 0.1–0.3)
BILIRUB SERPL-MCNC: 3 MG/DL (ref 0.1–1)
BLD PROD TYP BPU: NORMAL
BLD PROD TYP BPU: NORMAL
BLOOD UNIT EXPIRATION DATE: NORMAL
BLOOD UNIT EXPIRATION DATE: NORMAL
BLOOD UNIT TYPE CODE: 6200
BLOOD UNIT TYPE CODE: 6200
BLOOD UNIT TYPE: NORMAL
BLOOD UNIT TYPE: NORMAL
BUN SERPL-MCNC: 9 MG/DL (ref 6–20)
C DIFF GDH STL QL: NEGATIVE
C DIFF TOX A+B STL QL IA: NEGATIVE
CALCIUM SERPL-MCNC: 8.6 MG/DL (ref 8.7–10.5)
CHLORIDE SERPL-SCNC: 106 MMOL/L (ref 95–110)
CO2 SERPL-SCNC: 23 MMOL/L (ref 23–29)
CODING SYSTEM: NORMAL
CODING SYSTEM: NORMAL
CREAT SERPL-MCNC: 0.8 MG/DL (ref 0.5–1.4)
CROSSMATCH INTERPRETATION: NORMAL
CROSSMATCH INTERPRETATION: NORMAL
DACRYOCYTES BLD QL SMEAR: ABNORMAL
DIFFERENTIAL METHOD BLD: ABNORMAL
DISPENSE STATUS: NORMAL
DISPENSE STATUS: NORMAL
EOSINOPHIL # BLD AUTO: 0 K/UL (ref 0–0.5)
EOSINOPHIL # BLD AUTO: ABNORMAL K/UL (ref 0–0.5)
EOSINOPHIL NFR BLD: 0 % (ref 0–8)
EOSINOPHIL NFR BLD: 0 % (ref 0–8)
EOSINOPHIL NFR BLD: 0.3 % (ref 0–8)
ERYTHROCYTE [DISTWIDTH] IN BLOOD BY AUTOMATED COUNT: 27.1 % (ref 11.5–14.5)
ERYTHROCYTE [DISTWIDTH] IN BLOOD BY AUTOMATED COUNT: 27.3 % (ref 11.5–14.5)
ERYTHROCYTE [DISTWIDTH] IN BLOOD BY AUTOMATED COUNT: 27.4 % (ref 11.5–14.5)
EST. GFR  (NO RACE VARIABLE): >60 ML/MIN/1.73 M^2
GLUCOSE SERPL-MCNC: 100 MG/DL (ref 70–110)
HCT VFR BLD AUTO: 29.3 % (ref 37–48.5)
HCT VFR BLD AUTO: 30.1 % (ref 37–48.5)
HCT VFR BLD AUTO: 30.8 % (ref 37–48.5)
HGB BLD-MCNC: 8.3 G/DL (ref 12–16)
HGB BLD-MCNC: 8.3 G/DL (ref 12–16)
HGB BLD-MCNC: 8.4 G/DL (ref 12–16)
HYPOCHROMIA BLD QL SMEAR: ABNORMAL
IMM GRANULOCYTES # BLD AUTO: 0.25 K/UL (ref 0–0.04)
IMM GRANULOCYTES # BLD AUTO: ABNORMAL K/UL
IMM GRANULOCYTES # BLD AUTO: ABNORMAL K/UL (ref 0–0.04)
IMM GRANULOCYTES NFR BLD AUTO: 1.6 % (ref 0–0.5)
IMM GRANULOCYTES NFR BLD AUTO: ABNORMAL %
IMM GRANULOCYTES NFR BLD AUTO: ABNORMAL % (ref 0–0.5)
IRON SERPL-MCNC: 10 UG/DL (ref 30–160)
LACTATE SERPL-SCNC: 0.7 MMOL/L (ref 0.5–2.2)
LYMPHOCYTES # BLD AUTO: 2.1 K/UL (ref 1–4.8)
LYMPHOCYTES # BLD AUTO: ABNORMAL K/UL (ref 1–4.8)
LYMPHOCYTES NFR BLD: 10 % (ref 18–48)
LYMPHOCYTES NFR BLD: 13.7 % (ref 18–48)
LYMPHOCYTES NFR BLD: 16 % (ref 18–48)
MAGNESIUM SERPL-MCNC: 1.8 MG/DL (ref 1.6–2.6)
MCH RBC QN AUTO: 17.1 PG (ref 27–31)
MCH RBC QN AUTO: 17.2 PG (ref 27–31)
MCH RBC QN AUTO: 17.6 PG (ref 27–31)
MCHC RBC AUTO-ENTMCNC: 27.3 G/DL (ref 32–36)
MCHC RBC AUTO-ENTMCNC: 27.6 G/DL (ref 32–36)
MCHC RBC AUTO-ENTMCNC: 28.3 G/DL (ref 32–36)
MCV RBC AUTO: 62 FL (ref 82–98)
MCV RBC AUTO: 62 FL (ref 82–98)
MCV RBC AUTO: 63 FL (ref 82–98)
MONOCYTES # BLD AUTO: 1 K/UL (ref 0.3–1)
MONOCYTES # BLD AUTO: ABNORMAL K/UL (ref 0.3–1)
MONOCYTES NFR BLD: 3 % (ref 4–15)
MONOCYTES NFR BLD: 5 % (ref 4–15)
MONOCYTES NFR BLD: 6.1 % (ref 4–15)
NEUTROPHILS # BLD AUTO: 12.2 K/UL (ref 1.8–7.7)
NEUTROPHILS NFR BLD: 77.9 % (ref 38–73)
NEUTROPHILS NFR BLD: 78 % (ref 38–73)
NEUTROPHILS NFR BLD: 85 % (ref 38–73)
NEUTS BAND NFR BLD MANUAL: 3 %
NRBC BLD-RTO: 0 /100 WBC
NRBC BLD-RTO: 0 /100 WBC
NRBC BLD-RTO: 1 /100 WBC
NUM UNITS TRANS PACKED RBC: NORMAL
NUM UNITS TRANS PACKED RBC: NORMAL
OVALOCYTES BLD QL SMEAR: ABNORMAL
PHOSPHATE SERPL-MCNC: 3.4 MG/DL (ref 2.7–4.5)
PLATELET # BLD AUTO: 351 K/UL (ref 150–450)
PLATELET # BLD AUTO: 359 K/UL (ref 150–450)
PLATELET # BLD AUTO: 377 K/UL (ref 150–450)
PLATELET BLD QL SMEAR: ABNORMAL
PMV BLD AUTO: ABNORMAL FL (ref 9.2–12.9)
POCT GLUCOSE: 102 MG/DL (ref 70–110)
POCT GLUCOSE: 106 MG/DL (ref 70–110)
POCT GLUCOSE: 113 MG/DL (ref 70–110)
POCT GLUCOSE: 91 MG/DL (ref 70–110)
POCT GLUCOSE: 96 MG/DL (ref 70–110)
POIKILOCYTOSIS BLD QL SMEAR: SLIGHT
POTASSIUM SERPL-SCNC: 3.3 MMOL/L (ref 3.5–5.1)
PROT SERPL-MCNC: 7.9 G/DL (ref 6–8.4)
RBC # BLD AUTO: 4.72 M/UL (ref 4–5.4)
RBC # BLD AUTO: 4.82 M/UL (ref 4–5.4)
RBC # BLD AUTO: 4.9 M/UL (ref 4–5.4)
SATURATED IRON: 2 % (ref 20–50)
SODIUM SERPL-SCNC: 137 MMOL/L (ref 136–145)
TARGETS BLD QL SMEAR: ABNORMAL
TOTAL IRON BINDING CAPACITY: 470 UG/DL (ref 250–450)
TRANSFERRIN SERPL-MCNC: 336 MG/DL (ref 200–375)
VIT B12 SERPL-MCNC: 1289 PG/ML (ref 210–950)
WBC # BLD AUTO: 14.76 K/UL (ref 3.9–12.7)
WBC # BLD AUTO: 15.27 K/UL (ref 3.9–12.7)
WBC # BLD AUTO: 15.6 K/UL (ref 3.9–12.7)
WBC #/AREA STL HPF: ABNORMAL /[HPF]

## 2025-03-16 PROCEDURE — G0378 HOSPITAL OBSERVATION PER HR: HCPCS

## 2025-03-16 PROCEDURE — 25000003 PHARM REV CODE 250: Performed by: NURSE PRACTITIONER

## 2025-03-16 PROCEDURE — 96366 THER/PROPH/DIAG IV INF ADDON: CPT

## 2025-03-16 PROCEDURE — 63600175 PHARM REV CODE 636 W HCPCS: Performed by: INTERNAL MEDICINE

## 2025-03-16 PROCEDURE — 85007 BL SMEAR W/DIFF WBC COUNT: CPT | Performed by: NURSE PRACTITIONER

## 2025-03-16 PROCEDURE — 82306 VITAMIN D 25 HYDROXY: CPT | Performed by: NURSE PRACTITIONER

## 2025-03-16 PROCEDURE — 83605 ASSAY OF LACTIC ACID: CPT | Performed by: EMERGENCY MEDICINE

## 2025-03-16 PROCEDURE — 63600175 PHARM REV CODE 636 W HCPCS: Performed by: NURSE PRACTITIONER

## 2025-03-16 PROCEDURE — 85025 COMPLETE CBC W/AUTO DIFF WBC: CPT | Performed by: NURSE PRACTITIONER

## 2025-03-16 PROCEDURE — 36415 COLL VENOUS BLD VENIPUNCTURE: CPT | Performed by: EMERGENCY MEDICINE

## 2025-03-16 PROCEDURE — 85027 COMPLETE CBC AUTOMATED: CPT | Performed by: NURSE PRACTITIONER

## 2025-03-16 PROCEDURE — 96376 TX/PRO/DX INJ SAME DRUG ADON: CPT

## 2025-03-16 PROCEDURE — 99222 1ST HOSP IP/OBS MODERATE 55: CPT | Mod: ,,, | Performed by: SURGERY

## 2025-03-16 PROCEDURE — 80053 COMPREHEN METABOLIC PANEL: CPT | Performed by: NURSE PRACTITIONER

## 2025-03-16 PROCEDURE — 82248 BILIRUBIN DIRECT: CPT | Performed by: SURGERY

## 2025-03-16 PROCEDURE — 36430 TRANSFUSION BLD/BLD COMPNT: CPT

## 2025-03-16 PROCEDURE — 84100 ASSAY OF PHOSPHORUS: CPT | Performed by: NURSE PRACTITIONER

## 2025-03-16 PROCEDURE — 96375 TX/PRO/DX INJ NEW DRUG ADDON: CPT

## 2025-03-16 PROCEDURE — 82607 VITAMIN B-12: CPT | Performed by: NURSE PRACTITIONER

## 2025-03-16 PROCEDURE — 83735 ASSAY OF MAGNESIUM: CPT | Performed by: NURSE PRACTITIONER

## 2025-03-16 PROCEDURE — P9016 RBC LEUKOCYTES REDUCED: HCPCS | Performed by: EMERGENCY MEDICINE

## 2025-03-16 PROCEDURE — 36415 COLL VENOUS BLD VENIPUNCTURE: CPT | Performed by: NURSE PRACTITIONER

## 2025-03-16 PROCEDURE — 99223 1ST HOSP IP/OBS HIGH 75: CPT | Mod: ,,, | Performed by: STUDENT IN AN ORGANIZED HEALTH CARE EDUCATION/TRAINING PROGRAM

## 2025-03-16 PROCEDURE — 25000003 PHARM REV CODE 250: Performed by: INTERNAL MEDICINE

## 2025-03-16 RX ORDER — MORPHINE SULFATE 4 MG/ML
4 INJECTION, SOLUTION INTRAMUSCULAR; INTRAVENOUS EVERY 4 HOURS PRN
Status: DISCONTINUED | OUTPATIENT
Start: 2025-03-16 | End: 2025-03-19

## 2025-03-16 RX ORDER — SODIUM CHLORIDE 0.9 % (FLUSH) 0.9 %
3 SYRINGE (ML) INJECTION EVERY 12 HOURS PRN
Status: DISCONTINUED | OUTPATIENT
Start: 2025-03-16 | End: 2025-03-19 | Stop reason: HOSPADM

## 2025-03-16 RX ORDER — LANOLIN ALCOHOL/MO/W.PET/CERES
800 CREAM (GRAM) TOPICAL
Status: DISCONTINUED | OUTPATIENT
Start: 2025-03-16 | End: 2025-03-19 | Stop reason: HOSPADM

## 2025-03-16 RX ORDER — HYDRALAZINE HYDROCHLORIDE 20 MG/ML
10 INJECTION INTRAMUSCULAR; INTRAVENOUS EVERY 6 HOURS PRN
Status: DISCONTINUED | OUTPATIENT
Start: 2025-03-16 | End: 2025-03-19 | Stop reason: HOSPADM

## 2025-03-16 RX ORDER — HYDROCHLOROTHIAZIDE 25 MG/1
25 TABLET ORAL DAILY PRN
Status: DISCONTINUED | OUTPATIENT
Start: 2025-03-16 | End: 2025-03-16

## 2025-03-16 RX ORDER — HYDROCHLOROTHIAZIDE 25 MG/1
25 TABLET ORAL DAILY
Status: DISCONTINUED | OUTPATIENT
Start: 2025-03-16 | End: 2025-03-19 | Stop reason: HOSPADM

## 2025-03-16 RX ORDER — INSULIN ASPART 100 [IU]/ML
0-5 INJECTION, SOLUTION INTRAVENOUS; SUBCUTANEOUS
Status: DISCONTINUED | OUTPATIENT
Start: 2025-03-16 | End: 2025-03-19 | Stop reason: HOSPADM

## 2025-03-16 RX ORDER — SODIUM,POTASSIUM PHOSPHATES 280-250MG
2 POWDER IN PACKET (EA) ORAL
Status: DISCONTINUED | OUTPATIENT
Start: 2025-03-16 | End: 2025-03-19 | Stop reason: HOSPADM

## 2025-03-16 RX ORDER — ALUMINUM HYDROXIDE, MAGNESIUM HYDROXIDE, AND SIMETHICONE 1200; 120; 1200 MG/30ML; MG/30ML; MG/30ML
30 SUSPENSION ORAL 4 TIMES DAILY PRN
Status: DISCONTINUED | OUTPATIENT
Start: 2025-03-16 | End: 2025-03-19 | Stop reason: HOSPADM

## 2025-03-16 RX ORDER — SIMETHICONE 80 MG
1 TABLET,CHEWABLE ORAL 4 TIMES DAILY PRN
Status: DISCONTINUED | OUTPATIENT
Start: 2025-03-16 | End: 2025-03-19 | Stop reason: HOSPADM

## 2025-03-16 RX ORDER — ONDANSETRON HYDROCHLORIDE 2 MG/ML
8 INJECTION, SOLUTION INTRAVENOUS EVERY 6 HOURS PRN
Status: DISCONTINUED | OUTPATIENT
Start: 2025-03-16 | End: 2025-03-19 | Stop reason: HOSPADM

## 2025-03-16 RX ORDER — ACETAMINOPHEN 325 MG/1
650 TABLET ORAL EVERY 4 HOURS PRN
Status: DISCONTINUED | OUTPATIENT
Start: 2025-03-16 | End: 2025-03-19 | Stop reason: HOSPADM

## 2025-03-16 RX ORDER — ONDANSETRON HYDROCHLORIDE 2 MG/ML
8 INJECTION, SOLUTION INTRAVENOUS EVERY 6 HOURS PRN
Status: DISCONTINUED | OUTPATIENT
Start: 2025-03-16 | End: 2025-03-16 | Stop reason: SDUPTHER

## 2025-03-16 RX ORDER — TALC
6 POWDER (GRAM) TOPICAL NIGHTLY PRN
Status: DISCONTINUED | OUTPATIENT
Start: 2025-03-16 | End: 2025-03-19 | Stop reason: HOSPADM

## 2025-03-16 RX ORDER — GLUCAGON 1 MG
1 KIT INJECTION
Status: DISCONTINUED | OUTPATIENT
Start: 2025-03-16 | End: 2025-03-19 | Stop reason: HOSPADM

## 2025-03-16 RX ORDER — HYOSCYAMINE SULFATE 0.12 MG/1
0.12 TABLET SUBLINGUAL EVERY 4 HOURS PRN
Status: DISCONTINUED | OUTPATIENT
Start: 2025-03-16 | End: 2025-03-19 | Stop reason: HOSPADM

## 2025-03-16 RX ORDER — IBUPROFEN 200 MG
24 TABLET ORAL
Status: DISCONTINUED | OUTPATIENT
Start: 2025-03-16 | End: 2025-03-19 | Stop reason: HOSPADM

## 2025-03-16 RX ORDER — NALOXONE HCL 0.4 MG/ML
0.02 VIAL (ML) INJECTION
Status: DISCONTINUED | OUTPATIENT
Start: 2025-03-16 | End: 2025-03-19 | Stop reason: HOSPADM

## 2025-03-16 RX ORDER — HYDROMORPHONE HYDROCHLORIDE 1 MG/ML
1 INJECTION, SOLUTION INTRAMUSCULAR; INTRAVENOUS; SUBCUTANEOUS EVERY 4 HOURS PRN
Status: DISCONTINUED | OUTPATIENT
Start: 2025-03-16 | End: 2025-03-19

## 2025-03-16 RX ORDER — ACETAMINOPHEN 325 MG/1
650 TABLET ORAL EVERY 8 HOURS PRN
Status: DISCONTINUED | OUTPATIENT
Start: 2025-03-16 | End: 2025-03-19 | Stop reason: HOSPADM

## 2025-03-16 RX ORDER — IBUPROFEN 200 MG
16 TABLET ORAL
Status: DISCONTINUED | OUTPATIENT
Start: 2025-03-16 | End: 2025-03-19 | Stop reason: HOSPADM

## 2025-03-16 RX ORDER — TALC
9 POWDER (GRAM) TOPICAL NIGHTLY PRN
Status: DISCONTINUED | OUTPATIENT
Start: 2025-03-16 | End: 2025-03-16 | Stop reason: SDUPTHER

## 2025-03-16 RX ORDER — PANTOPRAZOLE SODIUM 40 MG/10ML
40 INJECTION, POWDER, LYOPHILIZED, FOR SOLUTION INTRAVENOUS 2 TIMES DAILY
Status: DISCONTINUED | OUTPATIENT
Start: 2025-03-16 | End: 2025-03-18

## 2025-03-16 RX ADMIN — POTASSIUM BICARBONATE 35 MEQ: 391 TABLET, EFFERVESCENT ORAL at 04:03

## 2025-03-16 RX ADMIN — PANTOPRAZOLE SODIUM 40 MG: 40 INJECTION, POWDER, FOR SOLUTION INTRAVENOUS at 12:03

## 2025-03-16 RX ADMIN — POTASSIUM BICARBONATE 35 MEQ: 391 TABLET, EFFERVESCENT ORAL at 06:03

## 2025-03-16 RX ADMIN — Medication 800 MG: at 04:03

## 2025-03-16 RX ADMIN — PIPERACILLIN SODIUM AND TAZOBACTAM SODIUM 3.38 G: 3; .375 INJECTION, POWDER, LYOPHILIZED, FOR SOLUTION INTRAVENOUS at 07:03

## 2025-03-16 RX ADMIN — HYDROMORPHONE HYDROCHLORIDE 1 MG: 1 INJECTION, SOLUTION INTRAMUSCULAR; INTRAVENOUS; SUBCUTANEOUS at 09:03

## 2025-03-16 RX ADMIN — HYDROMORPHONE HYDROCHLORIDE 1 MG: 1 INJECTION, SOLUTION INTRAMUSCULAR; INTRAVENOUS; SUBCUTANEOUS at 10:03

## 2025-03-16 RX ADMIN — HYDROCHLOROTHIAZIDE 25 MG: 25 TABLET ORAL at 12:03

## 2025-03-16 RX ADMIN — PANTOPRAZOLE SODIUM 40 MG: 40 INJECTION, POWDER, FOR SOLUTION INTRAVENOUS at 08:03

## 2025-03-16 RX ADMIN — HYDROMORPHONE HYDROCHLORIDE 1 MG: 1 INJECTION, SOLUTION INTRAMUSCULAR; INTRAVENOUS; SUBCUTANEOUS at 04:03

## 2025-03-16 RX ADMIN — HYDROCHLOROTHIAZIDE 25 MG: 25 TABLET ORAL at 11:03

## 2025-03-16 RX ADMIN — Medication 800 MG: at 08:03

## 2025-03-16 RX ADMIN — HYOSCYAMINE SULFATE 0.12 MG: 0.12 TABLET SUBLINGUAL at 12:03

## 2025-03-16 RX ADMIN — HYDRALAZINE HYDROCHLORIDE 10 MG: 20 INJECTION, SOLUTION INTRAMUSCULAR; INTRAVENOUS at 08:03

## 2025-03-16 RX ADMIN — ACETAMINOPHEN 650 MG: 325 TABLET ORAL at 10:03

## 2025-03-16 NOTE — ASSESSMENT & PLAN NOTE
Anemia is likely due to acute blood loss which was from menstrual cycle and possible GI bleeding from rectum and Iron deficiency. Most recent hemoglobin and hematocrit are listed below.  Recent Labs     03/15/25  2024   HGB 6.9*   HCT 27.2*     Plan  - Monitor serial CBC: Daily  - Transfuse PRBC if patient becomes hemodynamically unstable, symptomatic or H/H drops below 7/21.  - Patient has received 1 units of PRBCs on 3/16/25  - Patient's anemia is currently stable  -

## 2025-03-16 NOTE — H&P (VIEW-ONLY)
GENERAL SURGERY  INPATIENT CONSULT    REASON FOR CONSULT: Intra-abdominal cyst    HPI: Mirlande Boogie is a 44 y.o. female with a history of anemia, heavy menstrual bleeding, obesity, ASAEL admitted for nausea, vomiting and diarrhea with blood. Symptoms began last Wednesday after eating and she originally attributed this to food poisoning. After she noticed blood in her stool she decided to come to the emergency room. No blood in vomit. Denies sick contacts. Was having numerous bowel movements per day.  Last took antibiotics about one-month ago. Reports she was being worked up for tubal ligation and uterine ablation for heavy menstrual cycle. Finished her menstruation about one-week prior to symptoms. On presentation she was found have leukocytosis of 15.6.  Hemoglobin 6.9 and received 2 units PRBCs with the appropriate response and hemoglobin. Underwent CT imaging which showed no acute abnormality.  She did have a 7 cm simple cyst in the right abdomen adjacent to the gallbladder and loops of small bowel. Etiology uncertain but possibly representing mesenteric inclusion cyst. General surgery has been consulted for evaluation of the cystic lesion. Has been evaluated by GI in his planning for upper endoscopy tomorrow.  Will likely undergo outpatient C scope. Patient reports previous  sections x2, umbilical hernia repair with mesh and panniculectomy.    I have reviewed the patient's chart including prior progress notes, procedures and testing.     ROS:   Review of Systems    PROBLEM LIST:  Problem List[1]      HISTORY  Past Medical History:   Diagnosis Date    Anemia     Back pain     CHO intolerance     Elevated BP without diagnosis of hypertension 2018    Excessive daytime sleepiness     General anesthetics causing adverse effect in therapeutic use     highly sensitive    Hypothyroidism     Hypothyroidism     Morbid obesity 2018    Obstructive sleep apnea 2019    PONV (postoperative nausea and  vomiting)     Ventral hernia        Past Surgical History:   Procedure Laterality Date    BREAST SURGERY       SECTION      HERNIA REPAIR      [id number]    PANNICULECTOMY N/A 2021    Procedure: PANNICULECTOMY;  Surgeon: Alessandro Ramírez MD;  Location: [facility] OR;  Service: Plastics;  Laterality: N/A;    WISDOM TOOTH EXTRACTION      all 4 removed       Social History[2]    Family History   Problem Relation Name Age of Onset    Pulmonary fibrosis Mother      Early death Father  64         MEDS:  Medications Ordered Prior to Encounter[3]    ALLERGIES:  Review of patient's allergies indicates:   Allergen Reactions    Anesthetics - jake type- parabens Other (See Comments)    Codeine      sensitive         VITALS:  Temp:  [98.1 °F (36.7 °C)-99.3 °F (37.4 °C)] 98.2 °F (36.8 °C)  Pulse:  [] 86  Resp:  [16-20] 18  SpO2:  [96 %-100 %] 97 %  BP: (180-239)/() 186/86    I/O last 3 completed shifts:  In: 1144.9 [Blood:1144.9]  Out: -       PHYSICAL EXAM:  Physical Exam  Vitals reviewed.   Constitutional:       General: She is not in acute distress.     Appearance: Normal appearance. She is well-developed. She is obese.   HENT:      Head: Normocephalic and atraumatic.      Nose: Nose normal.   Eyes:      General: No scleral icterus.     Conjunctiva/sclera: Conjunctivae normal.   Neck:      Trachea: No tracheal tenderness or tracheal deviation.   Cardiovascular:      Rate and Rhythm: Normal rate and regular rhythm.      Pulses: Normal pulses.   Pulmonary:      Effort: Pulmonary effort is normal. No accessory muscle usage or respiratory distress.      Breath sounds: Normal breath sounds.   Abdominal:      General: There is distension (mid and lower abdomen).      Palpations: Abdomen is soft.      Tenderness: There is no abdominal tenderness. There is no guarding or rebound.   Musculoskeletal:         General: No swelling or tenderness. Normal range of motion.      Cervical back: Normal range of motion and  neck supple. No rigidity.   Skin:     General: Skin is warm and dry.      Coloration: Skin is not jaundiced.      Findings: No erythema.   Neurological:      General: No focal deficit present.      Mental Status: She is alert and oriented to person, place, and time.      Motor: No weakness or abnormal muscle tone.   Psychiatric:         Mood and Affect: Mood normal.         Behavior: Behavior normal.         Thought Content: Thought content normal.         Judgment: Judgment normal.           LABS:  Lab Results   Component Value Date    WBC 15.60 (H) 03/16/2025    RBC 4.72 03/16/2025    HGB 8.3 (L) 03/16/2025    HCT 29.3 (L) 03/16/2025    HCT 30.0 (L) 06/30/2021     03/16/2025     Lab Results   Component Value Date     03/16/2025     03/16/2025    K 3.3 (L) 03/16/2025     03/16/2025    CO2 23 03/16/2025    BUN 9 03/16/2025    CREATININE 0.8 03/16/2025    CALCIUM 8.6 (L) 03/16/2025     Lab Results   Component Value Date    ALT 7 (L) 03/16/2025    AST 19 03/16/2025    ALKPHOS 67 03/16/2025    BILITOT 3.0 (H) 03/16/2025     Lab Results   Component Value Date    MG 1.8 03/16/2025    PHOS 3.4 03/16/2025       STUDIES:  Images and reports were personally reviewed.    CT A/P  FINDINGS:  Abdomen:     - Lower thorax:Imaged heart is not enlarged.     - Lung bases: Lung bases are clear.     - Liver: Subtle low density in the right lobe of the liver posteriorly-axial image 58 series 2 corresponds to the prior noted lesion on MRI with the appearance of a hemangioma.  Liver is otherwise unremarkable as imaged.  The     - Gallbladder: There is no CT evidence of cholelithiasis or cholecystitis.     - Bile Ducts: No evidence of intra or extra hepatic biliary ductal dilation.     - Spleen: Negative.     - Kidneys: No mass or hydronephrosis.     - Adrenals: Unremarkable.     - Pancreas: No mass or peripancreatic fat stranding.     - Retroperitoneum:  No significant adenopathy.     - Vascular: No abdominal  aortic aneurysm.     - Abdominal wall:  Postoperative changes noted along subcutaneous fat of the abdomen.  Small fat containing epigastric hernia midline..     Pelvis:     There is enlarged lobular uterus with masses compatible with myomas.  There is a 2 cm left adnexal cyst.  There is no free fluid.  Bladder is unremarkable as imaged.     Bowel/Mesentery:     There is a 7 cm simple cystic lesion in the right upper quadrant caudal to the gallbladder abutting loops of bowel of uncertain etiology possibly mesenteric cyst not previously seen.  There is no evidence of bowel obstruction or inflammation.     Bones:  No acute osseous abnormality and no suspicious lytic or blastic lesion.     Impression:     No acute abnormality in the abdomen.     Large 7 cm simple cyst in the right hemiabdomen is of uncertain etiology possibly mesenteric cyst.  This was not seen previously.     Left ovarian 2 cm cyst.     Uterine myomas.      ASSESSMENT & PLAN:  44 y.o. female with nausea, vomiting, abdominal pain, diarrhea, blood in stool  -uncertain etiology for nausea, vomiting, abdominal pain, diarrhea, possible gastroenteritis  -GI planning to perform upper endoscopy tomorrow, outpatient colonoscopy  -etiology of abdominal cyst uncertain, at this time it appears to be simple without obvious rim enhancement concerns for abscess  -could consider IR aspiration if symptoms fail to improve  -fractionate bili  -no immediate surgical intervention recommended                     [1]   Patient Active Problem List  Diagnosis    Mixed hyperlipidemia    Class 3 severe obesity with body mass index (BMI) of 45.0 to 49.9 in adult    Obstructive sleep apnea    Poorly-controlled hypertension    Excess skin of abdomen    Symptomatic anemia    Occult blood in stools    GI bleed    Mesenteric cyst    Ovarian cyst    Sepsis   [2]   Social History  Tobacco Use    Smoking status: Never    Smokeless tobacco: Never   Substance Use Topics    Alcohol use: No  "   Drug use: No   [3]   No current facility-administered medications on file prior to encounter.     Current Outpatient Medications on File Prior to Encounter   Medication Sig Dispense Refill    fluconazole (DIFLUCAN) 150 MG Tab Take 1 tablet by mouth once as needed for antibiotic induced yeast infection. 1 tablet 1    hydroCHLOROthiazide (HYDRODIURIL) 25 MG tablet TAKE 1 TABLET BY MOUTH ONCE DAILY AS NEEDED 30 tablet 6    hydroCHLOROthiazide (HYDRODIURIL) 25 MG tablet Take one tablet by mouth once a day as needed 30 tablet 3    miSOPROStoL (CYTOTEC) 200 MCG Tab Take 1 tablet (200 mcg total) by mouth 1 hour before procedure. 1 tablet 0    tirzepatide (MOUNJARO) 15 mg/0.5 mL PnIj Inject 15mg subcutaneously into the skin once a week. 2 mL 3    tirzepatide (MOUNJARO) 15 mg/0.5 mL PnIj Inject 15 mg into the skin every 7 days. 2 mL 3    tirzepatide (MOUNJARO) 15 mg/0.5 mL PnIj inject 15mg into the skin once weekly 2 mL 1    butalbital-acetaminophen-caff -40 mg Cap take 1 tablet by mouth every four to six hours as needed for headache; MAX OF 4 TABLETS PER 24 HOURS. 30 capsule 1    cholecalciferol, vitamin D3, 1,250 mcg (50,000 unit) capsule TAKE 1 CAPSULE BY MOUTH ONCE WEEKLY. 12 capsule 3    pen needle, diabetic (PEN NEEDLE) 31 gauge x 1/4" Ndle 1 each by Misc.(Non-Drug; Combo Route) route once a week. Use with ozemoic 1 each 1    semaglutide (OZEMPIC) 0.25 mg or 0.5 mg(2 mg/1.5 mL) pen injector Inject 0.5 mg into the skin once a week. 3 pen 1    tirzepatide (MOUNJARO) 10 mg/0.5 mL PnIj Inject 10mg into the skin once a week for 4 weeks 4 pen 0    tirzepatide (MOUNJARO) 12.5 mg/0.5 mL PnIj Inject 12.5 mg into the skin every 7 days. 4 pen 0    tirzepatide (MOUNJARO) 12.5 mg/0.5 mL PnIj inject 12.5mg sc once a week for 4 weeks 4 pen 0    tirzepatide (MOUNJARO) 15 mg/0.5 mL PnIj inject 15mg under the skin once a week for 4 weeks 4 pen 3    tirzepatide (MOUNJARO) 15 mg/0.5 mL PnIj Inject 15 mg into the skin every 7 " days. 4 pen 0    tirzepatide (MOUNJARO) 15 mg/0.5 mL PnIj Inject 15 mg into the skin every 7 days. 4 Pen 0    tirzepatide (MOUNJARO) 2.5 mg/0.5 mL PnIj Inject 2.5 mg into the skin once weekly for 4 weeks. 4 pen 0    tirzepatide (MOUNJARO) 5 mg/0.5 mL PnIj INJECT 5MG UNDER THE SKIN ONCE A WEEK FOR 4 WEEKS 4 pen 0    tirzepatide (MOUNJARO) 7.5 mg/0.5 mL PnIj Inject 7.5MG into the skin once a week 4 pen 0    [DISCONTINUED] diltiaZEM (CARDIZEM CD) 240 MG 24 hr capsule Take 1 capsule (240 mg total) by mouth once daily. 90 capsule 1

## 2025-03-16 NOTE — PLAN OF CARE
Patient is 44 year old female with history of anemia, admitted with food poisoning. Patient had few episodes of diarrhea mixed with some blood. Patient also has menorrhagia. States that she already sees outpatient gynecologist, and has US pelvis done few weeks ago. She does not want to repeat the test. Also had a vaginal biopsy and tubal ablation planned. GI was consulted. However I do believe some blood mixed with some stools is likely due to acute enteritis. Continue symptomatic Rx. Will DC zosyn. Monitor Hb . Need iron supplements on discharge. Outpatient GYN follow up

## 2025-03-16 NOTE — CONSULTS
GENERAL SURGERY  INPATIENT CONSULT    REASON FOR CONSULT: Intra-abdominal cyst    HPI: Mirlande Boogie is a 44 y.o. female with a history of anemia, heavy menstrual bleeding, obesity, ASAEL admitted for nausea, vomiting and diarrhea with blood. Symptoms began last Wednesday after eating and she originally attributed this to food poisoning. After she noticed blood in her stool she decided to come to the emergency room. No blood in vomit. Denies sick contacts. Was having numerous bowel movements per day.  Last took antibiotics about one-month ago. Reports she was being worked up for tubal ligation and uterine ablation for heavy menstrual cycle. Finished her menstruation about one-week prior to symptoms. On presentation she was found have leukocytosis of 15.6.  Hemoglobin 6.9 and received 2 units PRBCs with the appropriate response and hemoglobin. Underwent CT imaging which showed no acute abnormality.  She did have a 7 cm simple cyst in the right abdomen adjacent to the gallbladder and loops of small bowel. Etiology uncertain but possibly representing mesenteric inclusion cyst. General surgery has been consulted for evaluation of the cystic lesion. Has been evaluated by GI in his planning for upper endoscopy tomorrow.  Will likely undergo outpatient C scope. Patient reports previous  sections x2, umbilical hernia repair with mesh and panniculectomy.    I have reviewed the patient's chart including prior progress notes, procedures and testing.     ROS:   Review of Systems    PROBLEM LIST:  Problem List[1]      HISTORY  Past Medical History:   Diagnosis Date    Anemia     Back pain     CHO intolerance     Elevated BP without diagnosis of hypertension 2018    Excessive daytime sleepiness     General anesthetics causing adverse effect in therapeutic use     highly sensitive    Hypothyroidism     Hypothyroidism     Morbid obesity 2018    Obstructive sleep apnea 2019    PONV (postoperative nausea and  vomiting)     Ventral hernia        Past Surgical History:   Procedure Laterality Date    BREAST SURGERY       SECTION      HERNIA REPAIR      [id number]    PANNICULECTOMY N/A 2021    Procedure: PANNICULECTOMY;  Surgeon: Alessandro Ramírez MD;  Location: [facility] OR;  Service: Plastics;  Laterality: N/A;    WISDOM TOOTH EXTRACTION      all 4 removed       Social History[2]    Family History   Problem Relation Name Age of Onset    Pulmonary fibrosis Mother      Early death Father  64         MEDS:  Medications Ordered Prior to Encounter[3]    ALLERGIES:  Review of patient's allergies indicates:   Allergen Reactions    Anesthetics - jake type- parabens Other (See Comments)    Codeine      sensitive         VITALS:  Temp:  [98.1 °F (36.7 °C)-99.3 °F (37.4 °C)] 98.2 °F (36.8 °C)  Pulse:  [] 86  Resp:  [16-20] 18  SpO2:  [96 %-100 %] 97 %  BP: (180-239)/() 186/86    I/O last 3 completed shifts:  In: 1144.9 [Blood:1144.9]  Out: -       PHYSICAL EXAM:  Physical Exam  Vitals reviewed.   Constitutional:       General: She is not in acute distress.     Appearance: Normal appearance. She is well-developed. She is obese.   HENT:      Head: Normocephalic and atraumatic.      Nose: Nose normal.   Eyes:      General: No scleral icterus.     Conjunctiva/sclera: Conjunctivae normal.   Neck:      Trachea: No tracheal tenderness or tracheal deviation.   Cardiovascular:      Rate and Rhythm: Normal rate and regular rhythm.      Pulses: Normal pulses.   Pulmonary:      Effort: Pulmonary effort is normal. No accessory muscle usage or respiratory distress.      Breath sounds: Normal breath sounds.   Abdominal:      General: There is distension (mid and lower abdomen).      Palpations: Abdomen is soft.      Tenderness: There is no abdominal tenderness. There is no guarding or rebound.   Musculoskeletal:         General: No swelling or tenderness. Normal range of motion.      Cervical back: Normal range of motion and  neck supple. No rigidity.   Skin:     General: Skin is warm and dry.      Coloration: Skin is not jaundiced.      Findings: No erythema.   Neurological:      General: No focal deficit present.      Mental Status: She is alert and oriented to person, place, and time.      Motor: No weakness or abnormal muscle tone.   Psychiatric:         Mood and Affect: Mood normal.         Behavior: Behavior normal.         Thought Content: Thought content normal.         Judgment: Judgment normal.           LABS:  Lab Results   Component Value Date    WBC 15.60 (H) 03/16/2025    RBC 4.72 03/16/2025    HGB 8.3 (L) 03/16/2025    HCT 29.3 (L) 03/16/2025    HCT 30.0 (L) 06/30/2021     03/16/2025     Lab Results   Component Value Date     03/16/2025     03/16/2025    K 3.3 (L) 03/16/2025     03/16/2025    CO2 23 03/16/2025    BUN 9 03/16/2025    CREATININE 0.8 03/16/2025    CALCIUM 8.6 (L) 03/16/2025     Lab Results   Component Value Date    ALT 7 (L) 03/16/2025    AST 19 03/16/2025    ALKPHOS 67 03/16/2025    BILITOT 3.0 (H) 03/16/2025     Lab Results   Component Value Date    MG 1.8 03/16/2025    PHOS 3.4 03/16/2025       STUDIES:  Images and reports were personally reviewed.    CT A/P  FINDINGS:  Abdomen:     - Lower thorax:Imaged heart is not enlarged.     - Lung bases: Lung bases are clear.     - Liver: Subtle low density in the right lobe of the liver posteriorly-axial image 58 series 2 corresponds to the prior noted lesion on MRI with the appearance of a hemangioma.  Liver is otherwise unremarkable as imaged.  The     - Gallbladder: There is no CT evidence of cholelithiasis or cholecystitis.     - Bile Ducts: No evidence of intra or extra hepatic biliary ductal dilation.     - Spleen: Negative.     - Kidneys: No mass or hydronephrosis.     - Adrenals: Unremarkable.     - Pancreas: No mass or peripancreatic fat stranding.     - Retroperitoneum:  No significant adenopathy.     - Vascular: No abdominal  aortic aneurysm.     - Abdominal wall:  Postoperative changes noted along subcutaneous fat of the abdomen.  Small fat containing epigastric hernia midline..     Pelvis:     There is enlarged lobular uterus with masses compatible with myomas.  There is a 2 cm left adnexal cyst.  There is no free fluid.  Bladder is unremarkable as imaged.     Bowel/Mesentery:     There is a 7 cm simple cystic lesion in the right upper quadrant caudal to the gallbladder abutting loops of bowel of uncertain etiology possibly mesenteric cyst not previously seen.  There is no evidence of bowel obstruction or inflammation.     Bones:  No acute osseous abnormality and no suspicious lytic or blastic lesion.     Impression:     No acute abnormality in the abdomen.     Large 7 cm simple cyst in the right hemiabdomen is of uncertain etiology possibly mesenteric cyst.  This was not seen previously.     Left ovarian 2 cm cyst.     Uterine myomas.      ASSESSMENT & PLAN:  44 y.o. female with nausea, vomiting, abdominal pain, diarrhea, blood in stool  -uncertain etiology for nausea, vomiting, abdominal pain, diarrhea, possible gastroenteritis  -GI planning to perform upper endoscopy tomorrow, outpatient colonoscopy  -etiology of abdominal cyst uncertain, at this time it appears to be simple without obvious rim enhancement concerns for abscess  -could consider IR aspiration if symptoms fail to improve  -fractionate bili  -no immediate surgical intervention recommended                     [1]   Patient Active Problem List  Diagnosis    Mixed hyperlipidemia    Class 3 severe obesity with body mass index (BMI) of 45.0 to 49.9 in adult    Obstructive sleep apnea    Poorly-controlled hypertension    Excess skin of abdomen    Symptomatic anemia    Occult blood in stools    GI bleed    Mesenteric cyst    Ovarian cyst    Sepsis   [2]   Social History  Tobacco Use    Smoking status: Never    Smokeless tobacco: Never   Substance Use Topics    Alcohol use: No  "   Drug use: No   [3]   No current facility-administered medications on file prior to encounter.     Current Outpatient Medications on File Prior to Encounter   Medication Sig Dispense Refill    fluconazole (DIFLUCAN) 150 MG Tab Take 1 tablet by mouth once as needed for antibiotic induced yeast infection. 1 tablet 1    hydroCHLOROthiazide (HYDRODIURIL) 25 MG tablet TAKE 1 TABLET BY MOUTH ONCE DAILY AS NEEDED 30 tablet 6    hydroCHLOROthiazide (HYDRODIURIL) 25 MG tablet Take one tablet by mouth once a day as needed 30 tablet 3    miSOPROStoL (CYTOTEC) 200 MCG Tab Take 1 tablet (200 mcg total) by mouth 1 hour before procedure. 1 tablet 0    tirzepatide (MOUNJARO) 15 mg/0.5 mL PnIj Inject 15mg subcutaneously into the skin once a week. 2 mL 3    tirzepatide (MOUNJARO) 15 mg/0.5 mL PnIj Inject 15 mg into the skin every 7 days. 2 mL 3    tirzepatide (MOUNJARO) 15 mg/0.5 mL PnIj inject 15mg into the skin once weekly 2 mL 1    butalbital-acetaminophen-caff -40 mg Cap take 1 tablet by mouth every four to six hours as needed for headache; MAX OF 4 TABLETS PER 24 HOURS. 30 capsule 1    cholecalciferol, vitamin D3, 1,250 mcg (50,000 unit) capsule TAKE 1 CAPSULE BY MOUTH ONCE WEEKLY. 12 capsule 3    pen needle, diabetic (PEN NEEDLE) 31 gauge x 1/4" Ndle 1 each by Misc.(Non-Drug; Combo Route) route once a week. Use with ozemoic 1 each 1    semaglutide (OZEMPIC) 0.25 mg or 0.5 mg(2 mg/1.5 mL) pen injector Inject 0.5 mg into the skin once a week. 3 pen 1    tirzepatide (MOUNJARO) 10 mg/0.5 mL PnIj Inject 10mg into the skin once a week for 4 weeks 4 pen 0    tirzepatide (MOUNJARO) 12.5 mg/0.5 mL PnIj Inject 12.5 mg into the skin every 7 days. 4 pen 0    tirzepatide (MOUNJARO) 12.5 mg/0.5 mL PnIj inject 12.5mg sc once a week for 4 weeks 4 pen 0    tirzepatide (MOUNJARO) 15 mg/0.5 mL PnIj inject 15mg under the skin once a week for 4 weeks 4 pen 3    tirzepatide (MOUNJARO) 15 mg/0.5 mL PnIj Inject 15 mg into the skin every 7 " days. 4 pen 0    tirzepatide (MOUNJARO) 15 mg/0.5 mL PnIj Inject 15 mg into the skin every 7 days. 4 Pen 0    tirzepatide (MOUNJARO) 2.5 mg/0.5 mL PnIj Inject 2.5 mg into the skin once weekly for 4 weeks. 4 pen 0    tirzepatide (MOUNJARO) 5 mg/0.5 mL PnIj INJECT 5MG UNDER THE SKIN ONCE A WEEK FOR 4 WEEKS 4 pen 0    tirzepatide (MOUNJARO) 7.5 mg/0.5 mL PnIj Inject 7.5MG into the skin once a week 4 pen 0    [DISCONTINUED] diltiaZEM (CARDIZEM CD) 240 MG 24 hr capsule Take 1 capsule (240 mg total) by mouth once daily. 90 capsule 1

## 2025-03-16 NOTE — ASSESSMENT & PLAN NOTE
CT abdomen and pelvis shows: Large 7 cm simple cyst in the right hemiabdomen is of uncertain etiology possibly mesenteric cyst. This was not seen previously.   Consult general surgery

## 2025-03-16 NOTE — PROGRESS NOTES
"Patient is on Zosyn 4.5 g IV Q6h over 30 minutes (intermittent infusion). Due to the patient's current diagnosis, zosyn via extended infusion provides better clinical outcomes regarding decreased mortality and decreased length of stay in comparison to intermittent infusion. Zosyn dose will be adjusted to Zosyn 4.5 g IV Q8h over 4 hours. Per pharmacy protocol, Zosyn can be adjusted automatically. Providers can override adjustment by re-ordering intermittent infusion with "dispense as written" in the administration instructions.     Austen Dove  [phone]    "

## 2025-03-16 NOTE — H&P
[facility]  History & Physical    Patient Name: Mirlande Boogie  MRN: [id number]  Patient Class: OP- Observation  Admission Date: 3/15/2025  Attending Physician: Megha Arenas MD   Primary Care Provider: No primary care provider on file.         Patient information was obtained from patient, past medical records, and ER records.     Subjective:     Principal Problem:Symptomatic anemia    Chief Complaint:   Chief Complaint   Patient presents with    Nausea    Vomiting    Diarrhea        HPI:   Mirlande Boogie is a 44 year old female with a past medical history of anemia, elevated BP readings without hypertension, obesity, hypothyroidism not on medication, who presented to the ED with a complaint of lower abdominal cramping, nausea, vomiting and diarrhea since Wednesday. She states that she ate a ham and cheese sandwich from [employer] that tasted off, and the following day began having abdominal cramping, nausea, vomiting and diarrhea. She reports about 4 episodes of diarrhea today, and saw blood in the specimen that she obtained when she arrived to the ED. She does report taking ibuprofen frequently, and denies hematemesis. She states that she feels dehydrated and believes she has food poisoning. She denies fever but reports chills. She states that she just ended her menstrual cycle. She denies other complaint.    ED work up included a CBC which was significant for an elevated WBC of 15.6, H/H 6.9/27.2. She has a history of anemia and hasn't been taking her oral supplements. She also ended her menstrual cycle a few days ago. 2 units of PRBC were ordered by the ED to be transfused. CMP significant for CO2 22, glucose 113. Beta hydroxybutyrate 0.2. Lipase 31, Lactate dehydrogenase 199, Ferritin 3. Urinalysis negative for evidence of infection. CT abdomen and pelvis revealed: No acute abnormality in the abdomen. Large 7 cm simple cyst in the right hemiabdomen is of uncertain  etiology possibly mesenteric cyst.  This was not seen previously. Left ovarian 2 cm cyst. Uterine myomas. Blood cultures were obtained. She was initiated on Zosyn in the ED. Stool studies were collected and sent to lab. Occult blood was seen on labs, as well as many neutrophils on the stool WBC. Rotavirus negative. GI consulted, General surgery consulted. Hospital medicine consulted for admission and further management.     Past Medical History:   Diagnosis Date    Anemia     Back pain     CHO intolerance     Elevated BP without diagnosis of hypertension 2018    Excessive daytime sleepiness     General anesthetics causing adverse effect in therapeutic use     highly sensitive    Hypothyroidism     Hypothyroidism     Morbid obesity 2018    Obstructive sleep apnea 2019    PONV (postoperative nausea and vomiting)     Ventral hernia        Past Surgical History:   Procedure Laterality Date    BREAST SURGERY       SECTION      HERNIA REPAIR      [id number]    PANNICULECTOMY N/A 2021    Procedure: PANNICULECTOMY;  Surgeon: Alessandro Ramírez MD;  Location: [facility];  Service: Plastics;  Laterality: N/A;    WISDOM TOOTH EXTRACTION      all 4 removed       Review of patient's allergies indicates:   Allergen Reactions    Anesthetics - jake type- parabens Other (See Comments)    Codeine      sensitive       No current facility-administered medications on file prior to encounter.     Current Outpatient Medications on File Prior to Encounter   Medication Sig    butalbital-acetaminophen-caff -40 mg Cap take 1 tablet by mouth every four to six hours as needed for headache; MAX OF 4 TABLETS PER 24 HOURS.    cholecalciferol, vitamin D3, 1,250 mcg (50,000 unit) capsule TAKE 1 CAPSULE BY MOUTH ONCE WEEKLY.    fluconazole (DIFLUCAN) 150 MG Tab Take 1 tablet by mouth once as needed for antibiotic induced yeast infection.    hydroCHLOROthiazide (HYDRODIURIL) 25 MG tablet TAKE 1 TABLET BY MOUTH ONCE DAILY AS  "NEEDED    hydroCHLOROthiazide (HYDRODIURIL) 25 MG tablet Take one tablet by mouth once a day as needed    miSOPROStoL (CYTOTEC) 200 MCG Tab Take 1 tablet (200 mcg total) by mouth 1 hour before procedure.    pen needle, diabetic (PEN NEEDLE) 31 gauge x 1/4" Ndle 1 each by Misc.(Non-Drug; Combo Route) route once a week. Use with ozemoic    semaglutide (OZEMPIC) 0.25 mg or 0.5 mg(2 mg/1.5 mL) pen injector Inject 0.5 mg into the skin once a week.    tirzepatide (MOUNJARO) 10 mg/0.5 mL PnIj Inject 10mg into the skin once a week for 4 weeks    tirzepatide (MOUNJARO) 12.5 mg/0.5 mL PnIj Inject 12.5 mg into the skin every 7 days.    tirzepatide (MOUNJARO) 12.5 mg/0.5 mL PnIj inject 12.5mg sc once a week for 4 weeks    tirzepatide (MOUNJARO) 15 mg/0.5 mL PnIj inject 15mg under the skin once a week for 4 weeks    tirzepatide (MOUNJARO) 15 mg/0.5 mL PnIj Inject 15 mg into the skin every 7 days.    tirzepatide (MOUNJARO) 15 mg/0.5 mL PnIj Inject 15 mg into the skin every 7 days.    tirzepatide (MOUNJARO) 15 mg/0.5 mL PnIj Inject 15mg subcutaneously into the skin once a week.    tirzepatide (MOUNJARO) 15 mg/0.5 mL PnIj Inject 15 mg into the skin every 7 days.    tirzepatide (MOUNJARO) 15 mg/0.5 mL PnIj inject 15mg into the skin once weekly    tirzepatide (MOUNJARO) 2.5 mg/0.5 mL PnIj Inject 2.5 mg into the skin once weekly for 4 weeks.    tirzepatide (MOUNJARO) 5 mg/0.5 mL PnIj INJECT 5MG UNDER THE SKIN ONCE A WEEK FOR 4 WEEKS    tirzepatide (MOUNJARO) 7.5 mg/0.5 mL PnIj Inject 7.5MG into the skin once a week    [DISCONTINUED] diltiaZEM (CARDIZEM CD) 240 MG 24 hr capsule Take 1 capsule (240 mg total) by mouth once daily.     Family History       Problem Relation (Age of Onset)    Early death Father (64)    Pulmonary fibrosis Mother          Tobacco Use    Smoking status: Never    Smokeless tobacco: Never   Substance and Sexual Activity    Alcohol use: No    Drug use: No    Sexual activity: Never     Review of Systems "   Constitutional:  Positive for chills. Negative for fever.   HENT:  Negative for congestion and sore throat.    Eyes:  Negative for visual disturbance.   Respiratory:  Negative for cough and shortness of breath.    Cardiovascular:  Negative for chest pain and palpitations.   Gastrointestinal:  Positive for abdominal pain (lower abdominal intermittent cramping), blood in stool, diarrhea and nausea. Negative for vomiting.   Endocrine: Negative for cold intolerance and heat intolerance.   Genitourinary:  Negative for dysuria and hematuria.   Musculoskeletal:  Negative for arthralgias and myalgias.   Skin:  Negative for pallor and rash.   Neurological:  Negative for tremors and seizures.   Hematological:  Negative for adenopathy. Does not bruise/bleed easily.   Psychiatric/Behavioral:  Negative for hallucinations.    All other systems reviewed and are negative.    Objective:     Vital Signs (Most Recent):  Temp: 99.2 °F (37.3 °C) (03/16/25 0053)  Pulse: 97 (03/16/25 0053)  Resp: 16 (03/15/25 2029)  BP: (!) 218/99 (03/16/25 0053)  SpO2: 100 % (03/16/25 0053) Vital Signs (24h Range):  Temp:  [98.1 °F (36.7 °C)-99.2 °F (37.3 °C)] 99.2 °F (37.3 °C)  Pulse:  [] 97  Resp:  [16] 16  SpO2:  [96 %-100 %] 100 %  BP: (188-239)/() 218/99     Weight: 124.3 kg (274 lb)  Body mass index is 48.54 kg/m².     Physical Exam  Vitals and nursing note reviewed.   Constitutional:       General: She is awake. She is not in acute distress.     Appearance: Normal appearance. She is well-developed. She is not ill-appearing.   HENT:      Head: Normocephalic and atraumatic.      Mouth/Throat:      Lips: Pink.      Mouth: Mucous membranes are moist.   Eyes:      Conjunctiva/sclera: Conjunctivae normal.      Pupils: Pupils are equal, round, and reactive to light.   Neck:      Thyroid: No thyromegaly.      Vascular: No JVD.   Cardiovascular:      Rate and Rhythm: Normal rate and regular rhythm.      Heart sounds: Normal heart sounds, S1  normal and S2 normal. No murmur heard.     No friction rub. No gallop.   Pulmonary:      Effort: Pulmonary effort is normal.      Breath sounds: Normal breath sounds.   Abdominal:      General: Abdomen is protuberant. Bowel sounds are normal. There is no distension.      Palpations: Abdomen is soft. There is no mass.      Tenderness: There is abdominal tenderness in the right lower quadrant, suprapubic area and left lower quadrant.   Musculoskeletal:         General: Normal range of motion.      Cervical back: Full passive range of motion without pain, normal range of motion and neck supple.   Skin:     General: Skin is warm and dry.      Capillary Refill: Capillary refill takes less than 2 seconds.   Neurological:      General: No focal deficit present.      Mental Status: She is alert and oriented to person, place, and time. Mental status is at baseline.      GCS: GCS eye subscore is 4. GCS verbal subscore is 5. GCS motor subscore is 6.      Cranial Nerves: No cranial nerve deficit.      Sensory: Sensation is intact.      Motor: Motor function is intact.   Psychiatric:         Behavior: Behavior normal. Behavior is cooperative.              CRANIAL NERVES     CN III, IV, VI   Pupils are equal, round, and reactive to light.       Significant Labs: All pertinent labs within the past 24 hours have been reviewed.  CBC:   Recent Labs   Lab 03/15/25  2024   WBC 15.60*   HGB 6.9*   HCT 27.2*        CMP:   Recent Labs   Lab 03/15/25  2024      K 3.8      CO2 22*   *   BUN 10   CREATININE 0.8   CALCIUM 8.9   PROT 8.7*   ALBUMIN 3.8   BILITOT 0.6   ALKPHOS 68   AST 22   ALT 8*   ANIONGAP 10       Lipase:   Recent Labs   Lab 03/15/25  2024   LIPASE 31       POCT Glucose:   Recent Labs   Lab 03/15/25  2019   POCTGLUCOSE 121*       Urine Studies:   Recent Labs   Lab 03/15/25  2225   COLORU Yellow   APPEARANCEUA Clear   PHUR 7.0   SPECGRAV >1.030*   PROTEINUA Trace*   GLUCUA Negative   KETONESU  Negative   BILIRUBINUA Negative   OCCULTUA Negative   NITRITE Negative   UROBILINOGEN Negative   LEUKOCYTESUR Negative     03/15/25 2038  Beta - Hydroxybutyrate, Serum  Collected: 03/15/25 2024  Final result  Specimen: Blood    Beta-Hydroxybutyrate 0.2 mmol/L        03/15/25 2243  Ferritin  Collected: 03/15/25 2157  Final result  Specimen: Blood    Ferritin 3 Low  ng/mL            03/15/25 2242  Type & Screen  Collected: 03/15/25 2158  Final result  Specimen: Blood    Group & Rh A POS Specimen Outdate 03/18/2025 23:59   Indirect Todd NEG            03/15/25 2223  Lactate Dehydrogenase  Collected: 03/15/25 2157  Final result  Specimen: Blood     U/L         03/16/25 0014  WBC, Stool  Collected: 03/15/25 2240  Final result  Specimen: Stool    Stool WBC Many neutrophils seen Abnormal             03/15/25 2336  Rotavirus antigen, stool  Collected: 03/15/25 2240  Final result  Specimen: Stool    Rotavirus Negative            03/15/25 2256  Occult blood x 1, stool  Collected: 03/15/25 2240  Final result  Specimen: Stool    Occult Blood Positive Abnormal           Significant Imaging: I have reviewed all pertinent imaging results/findings within the past 24 hours.  Imaging Results              CT Abdomen Pelvis With IV Contrast NO Oral Contrast (Final result)  Result time 03/15/25 22:31:14      Final result by Carolyn Fontanez MD (03/15/25 22:31:14)                   Impression:      No acute abnormality in the abdomen.    Large 7 cm simple cyst in the right hemiabdomen is of uncertain etiology possibly mesenteric cyst.  This was not seen previously.    Left ovarian 2 cm cyst.    Uterine myomas.      Electronically signed by: Carolyn Fontanez  Date:    03/15/2025  Time:    22:31               Narrative:    EXAMINATION:  CT ABDOMEN PELVIS WITH IV CONTRAST    CLINICAL HISTORY:  LLQ abdominal pain;Nausea/vomiting;    TECHNIQUE:  Low dose axial images, sagittal and coronal reformations were obtained  from the lung bases to the pubic symphysis following the IV administration of 100 mL of Omnipaque 350 .  Oral contrast was not administered.    COMPARISON:  Abdomen MRI 10/10/2016, CT abdomen pelvis 09/09/2016    FINDINGS:  Abdomen:    - Lower thorax:Imaged heart is not enlarged.    - Lung bases: Lung bases are clear.    - Liver: Subtle low density in the right lobe of the liver posteriorly-axial image 58 series 2 corresponds to the prior noted lesion on MRI with the appearance of a hemangioma.  Liver is otherwise unremarkable as imaged.  The    - Gallbladder: There is no CT evidence of cholelithiasis or cholecystitis.    - Bile Ducts: No evidence of intra or extra hepatic biliary ductal dilation.    - Spleen: Negative.    - Kidneys: No mass or hydronephrosis.    - Adrenals: Unremarkable.    - Pancreas: No mass or peripancreatic fat stranding.    - Retroperitoneum:  No significant adenopathy.    - Vascular: No abdominal aortic aneurysm.    - Abdominal wall:  Postoperative changes noted along subcutaneous fat of the abdomen.  Small fat containing epigastric hernia midline..    Pelvis:    There is enlarged lobular uterus with masses compatible with myomas.  There is a 2 cm left adnexal cyst.  There is no free fluid.  Bladder is unremarkable as imaged.    Bowel/Mesentery:    There is a 7 cm simple cystic lesion in the right upper quadrant caudal to the gallbladder abutting loops of bowel of uncertain etiology possibly mesenteric cyst not previously seen.  There is no evidence of bowel obstruction or inflammation.    Bones:  No acute osseous abnormality and no suspicious lytic or blastic lesion.                                      Assessment/Plan:     * Symptomatic anemia  Anemia is likely due to acute blood loss which was from menstrual cycle and possible GI bleeding from rectum and Iron deficiency. Most recent hemoglobin and hematocrit are listed below.  Recent Labs     03/15/25  2024   HGB 6.9*   HCT 27.2*      Plan  - Monitor serial CBC: Daily  - Transfuse PRBC if patient becomes hemodynamically unstable, symptomatic or H/H drops below 7/21.  - Patient has received 1 units of PRBCs on 3/16/25  - Patient's anemia is currently stable  -     Sepsis  This patient does have evidence of infective focus  My overall impression is sepsis.  Source: Abdominal  Antibiotics given-   Antibiotics (72h ago, onward)      Start     Stop Route Frequency Ordered    03/16/25 0245  piperacillin-tazobactam (ZOSYN) 3.375 g in D5W 100 mL IVPB (MB+)         -- IV Every 6 hours (non-standard times) 03/16/25 0137          Latest lactate reviewed-  Recent Labs   Lab 03/15/25  2029   POCLAC 1.37         Fluid challenge Fluid Not Needed - Patient is not hypotensive and/or lactate is less than 4.0.     Post- resuscitation assessment No - Post resuscitation assessment not needed       Will Not start Pressors- Levophed for MAP of 65  Source control achieved by: antibiotics    Ovarian cyst  Ovarian cyst and uterine myomas seen on CT. Will consult GYN, likely to have outpatient follow up  Will obtain transvaginal US      Mesenteric cyst  CT abdomen and pelvis shows: Large 7 cm simple cyst in the right hemiabdomen is of uncertain etiology possibly mesenteric cyst. This was not seen previously.   Consult general surgery      GI bleed  Patient's hemorrhage is due to gastrointestinal bleed, this bleeding is not associated with a medication or a coagulopathy. Patients most recent Hgb, Hct, platelets, and INR are listed below.  Recent Labs     03/15/25  2024   HGB 6.9*   HCT 27.2*        Plan  - Will trend hemoglobin/hematocrit Daily  - Will monitor and correct any coagulation defects  - Will transfuse if Hgb is <7g/dl (<8g/dl in cases of active ACS) or if patient has rapid bleeding leading to hemodynamic instability  - Plan to transfuse 2 units PRBC  On pathway, GI consulted--reported blood in stools, occult blood positive  States she does take a lot  of ibuprofen, denies hematemesis    Class 3 severe obesity with body mass index (BMI) of 45.0 to 49.9 in adult  Body mass index is 48.54 kg/m². Morbid obesity complicates all aspects of disease management from diagnostic modalities to treatment. Weight loss encouraged and health benefits explained to patient.           VTE Risk Mitigation (From admission, onward)           Ordered     IP VTE HIGH RISK PATIENT  Once         03/16/25 0028     Place sequential compression device  Until discontinued         03/16/25 0028     Reason for No Pharmacological VTE Prophylaxis  Once        Question:  Reasons:  Answer:  Active Bleeding    03/16/25 0011     Place sequential compression device  Until discontinued         03/16/25 0011                                  DELMAR Hernandez  Department of Hospital Medicine  [facility]

## 2025-03-16 NOTE — ASSESSMENT & PLAN NOTE
Patient's hemorrhage is due to gastrointestinal bleed, this bleeding is not associated with a medication or a coagulopathy. Patients most recent Hgb, Hct, platelets, and INR are listed below.  Recent Labs     03/15/25  2024   HGB 6.9*   HCT 27.2*        Plan  - Will trend hemoglobin/hematocrit Daily  - Will monitor and correct any coagulation defects  - Will transfuse if Hgb is <7g/dl (<8g/dl in cases of active ACS) or if patient has rapid bleeding leading to hemodynamic instability  - Plan to transfuse 2 units PRBC  On pathway, GI consulted--reported blood in stools, occult blood positive  States she does take a lot of ibuprofen, denies hematemesis

## 2025-03-16 NOTE — ED PROVIDER NOTES
Encounter Date: 3/15/2025       History     Chief Complaint   Patient presents with    Nausea    Vomiting    Diarrhea     HPI 44-year-old woman with a past medical history of type 2 diabetes, migraine, obesity, elevated blood pressure, anemia presents emergency department with nausea vomiting diarrhea and lower abdominal cramps worsening since Wednesday after eating a ham and cheese sandwich from [employer] that tasted off.  She has a proximally 4 episodes of diarrhea today and noticed a little bit of blood when she wiped a for come to the ED. She feels dehydrated and thinks she has food poisoning.  Review of patient's allergies indicates:   Allergen Reactions    Anesthetics - jake type- parabens Other (See Comments)    Codeine      sensitive     Past Medical History:   Diagnosis Date    Anemia     Back pain     CHO intolerance     Elevated BP without diagnosis of hypertension 2018    Excessive daytime sleepiness     General anesthetics causing adverse effect in therapeutic use     highly sensitive    Hypothyroidism     Hypothyroidism     Morbid obesity 2018    Obstructive sleep apnea 2019    PONV (postoperative nausea and vomiting)     Ventral hernia      Past Surgical History:   Procedure Laterality Date    BREAST SURGERY       SECTION      HERNIA REPAIR      [id number]    PANNICULECTOMY N/A 2021    Procedure: PANNICULECTOMY;  Surgeon: Alessandro Ramírez MD;  Location: [facility];  Service: Plastics;  Laterality: N/A;    WISDOM TOOTH EXTRACTION      all 4 removed     Family History   Problem Relation Name Age of Onset    Pulmonary fibrosis Mother      Early death Father  64     Social History[1]  Review of Systems   Constitutional:  Positive for chills. Negative for fever.   HENT:  Negative for sore throat.    Respiratory:  Negative for shortness of breath.    Cardiovascular:  Negative for chest pain.   Gastrointestinal:  Positive for abdominal pain (lower abdominal cramping), diarrhea and  nausea.   Genitourinary:  Negative for dysuria.   Musculoskeletal:  Negative for back pain.   Skin:  Negative for rash.   Neurological:  Negative for weakness.   Hematological:  Does not bruise/bleed easily.       Physical Exam     Initial Vitals [03/15/25 1954]   BP Pulse Resp Temp SpO2   (!) 188/123 101 16 98.1 °F (36.7 °C) 100 %      MAP       --         Physical Exam    Constitutional: Vital signs are normal. She appears well-developed and well-nourished.  Non-toxic appearance. No distress.   HENT:   Head: Normocephalic and atraumatic.   Eyes: EOM are normal. Pupils are equal, round, and reactive to light.   Neck: Neck supple. No JVD present.   Normal range of motion.  Cardiovascular:  Normal rate, regular rhythm, normal heart sounds and intact distal pulses.     Exam reveals no gallop and no friction rub.       No murmur heard.  Pulmonary/Chest: Breath sounds normal. She has no wheezes. She has no rhonchi. She has no rales.   Abdominal: Abdomen is soft. Bowel sounds are normal. There is abdominal tenderness in the right lower quadrant, suprapubic area and left lower quadrant. There is no rebound and no guarding.   Musculoskeletal:         General: Normal range of motion.      Cervical back: Normal range of motion and neck supple. No rigidity.     Neurological: She is alert and oriented to person, place, and time. She has normal strength and normal reflexes. No cranial nerve deficit or sensory deficit. She exhibits normal muscle tone. Coordination normal. GCS eye subscore is 4. GCS verbal subscore is 5. GCS motor subscore is 6.   Skin: Skin is warm and dry.   Psychiatric: She has a normal mood and affect. Her speech is normal and behavior is normal. She is not actively hallucinating.         ED Course   Procedures  Labs Reviewed   CBC W/ AUTO DIFFERENTIAL - Abnormal       Result Value    WBC 15.60 (*)     RBC 4.59      Hemoglobin 6.9 (*)     Hematocrit 27.2 (*)     MCV 59 (*)     MCH 15.0 (*)     MCHC 25.4 (*)      RDW 24.2 (*)     Platelets 426      MPV SEE COMMENT      Immature Granulocytes CANCELED      Immature Grans (Abs) CANCELED      Lymph # CANCELED      Mono # CANCELED      Eos # CANCELED      Baso # CANCELED      nRBC 0      Gran % 84.0 (*)     Lymph % 9.0 (*)     Mono % 3.0 (*)     Eosinophil % 0.0      Basophil % 0.0      Bands 4.0      Platelet Estimate Appears normal      Aniso Marked      Poik Slight      Hypo Moderate      Ovalocytes Occasional      Differential Method Manual      Narrative:     Hgb  critical result(s) called and verbal readback obtained from   Michelle Kevin RN. by VALERIY 03/15/2025 20:43   COMPREHENSIVE METABOLIC PANEL - Abnormal    Sodium 138      Potassium 3.8      Chloride 106      CO2 22 (*)     Glucose 113 (*)     BUN 10      Creatinine 0.8      Calcium 8.9      Total Protein 8.7 (*)     Albumin 3.8      Total Bilirubin 0.6      Alkaline Phosphatase 68      AST 22      ALT 8 (*)     eGFR >60      Anion Gap 10     POCT GLUCOSE - Abnormal    POCT Glucose 121 (*)    ISTAT PROCEDURE - Abnormal    POC PH 7.391      POC PCO2 44.1      POC PO2 23 (*)     POC HCO3 26.7      POC BE 2      POC SATURATED O2 39      POC Lactate 1.37      POC TCO2 28      Sample VENOUS      Site Other      Allens Test N/A      DelSys Room Air     CULTURE, STOOL   CULTURE, BLOOD   CULTURE, BLOOD   CLOSTRIDIUM DIFFICILE   BETA - HYDROXYBUTYRATE, SERUM    Beta-Hydroxybutyrate 0.2     LIPASE    Lipase 31     URINALYSIS, REFLEX TO URINE CULTURE   OCCULT BLOOD X 1, STOOL   WBC, STOOL   ROTAVIRUS ANTIGEN, STOOL   IRON AND TIBC   FERRITIN   LACTATE DEHYDROGENASE   TYPE & SCREEN   POCT GLUCOSE MONITORING CONTINUOUS   PREPARE RBC SOFT     EKG Readings: (Independently Interpreted)   Normal sinus rhythm, 88 beats per minute with minimal voltage criteria for LVH, no STEMI.       Imaging Results              CT Abdomen Pelvis With IV Contrast NO Oral Contrast (In process)  Result time 03/15/25 22:09:50                      Medications   0.9%  NaCl infusion (for blood administration) (has no administration in time range)   piperacillin-tazobactam (ZOSYN) 4.5 g in D5W 100 mL IVPB (MB+) (has no administration in time range)   sodium chloride 0.9% bolus 1,000 mL 1,000 mL (1,000 mLs Intravenous New Bag 3/15/25 2057)   ondansetron injection 4 mg (4 mg Intravenous Given 3/15/25 2057)   iohexoL (OMNIPAQUE 350) 350 mg iodine/mL injection (100 mLs Intravenous Given 3/15/25 2124)     Medical Decision Making  44-year-old woman with a past medical history of type 2 diabetes, migraine, obesity, elevated blood pressure, anemia presents emergency department with nausea vomiting diarrhea and lower abdominal cramps worsening since Wednesday after eating a ham and cheese sandwich from [employer] that tasted off.  She has a proximally 4 episodes of diarrhea today and noticed a little bit of blood when she wiped a for come to the ED. She feels dehydrated and thinks she has food poisoning.  Differential diagnosis includes viral gastroenteritis, food poisoning, entero invasive E coli, Shigella, Campylobacter.  Patient is afebrile with a elevated white blood cell count 15.6 K. she did have heart rate greater than 90 documented meeting criteria for sepsis with suspected intra-abdominal infection.  Lactic acid was normal.  1.37.  Patient started on empiric antibiotics after blood cultures.  Hemoglobin is 6.9 and patient has not been taking her supplements for iron she has a history of iron-deficiency anemia and recently had her menstrual cycle.  This is likely the cause of her worsening anemia.  Patient would like a blood transfusion and I have ordered blood for her since she is symptomatic.  Renal function is normal with creatinine 0.8 and BUN of 10.  No evidence of DKA with a normal beta hydroxybutyrate 0.2.  Lipase is 31, no evidence pancreatitis.  CT abdomen pelvis pending.  Discussed Hospital Medicine who agrees to admit the patient pending CT of the  abdomen pelvis results.      Amount and/or Complexity of Data Reviewed  Labs: ordered.  Radiology: ordered.    Risk  Prescription drug management.              Attending Attestation:             Attending ED Notes:   Critical Care Time MDM    The high probability of sudden, clinically significant deterioration in the patient's condition required the highest level of my preparedness to intervene urgently.    The services I provided to this patient were to treat and/or prevent clinically significant deterioration that could result in permanent disability, chronic pain and/or death.   Patient with symptomatic critically low anemia with a hemoglobin of 6.9 receiving blood transfusion also potentially severe infection with septic workup and empiric IV antibiotics.    Services included the following: chart data review, reviewing nursing notes and/or old charts, documentation time, consultant collaboration regarding findings and treatment options, medication orders and management, direct patient care, vital sign assessments and ordering, interpreting and reviewing diagnostic studies/lab tests.    Aggregate critical care time was 55 minutes, which includes only time during which I was engaged in work directly related to the patient's care, as described above, whether at the bedside or elsewhere in the Emergency Department.     Gus Dailey M.D.                                      Clinical Impression:  Final diagnoses:  [R73.9] Hyperglycemia  [D64.9] Symptomatic anemia (Primary)  [R19.7] Diarrhea, unspecified type                   Gus Dailey MD  03/15/25 2222         [1]   Social History  Tobacco Use    Smoking status: Never    Smokeless tobacco: Never   Substance Use Topics    Alcohol use: No    Drug use: No        Gus Dailey MD  03/15/25 5537

## 2025-03-16 NOTE — ASSESSMENT & PLAN NOTE
This patient does have evidence of infective focus  My overall impression is sepsis.  Source: Abdominal  Antibiotics given-   Antibiotics (72h ago, onward)      Start     Stop Route Frequency Ordered    03/16/25 0245  piperacillin-tazobactam (ZOSYN) 3.375 g in D5W 100 mL IVPB (MB+)         -- IV Every 6 hours (non-standard times) 03/16/25 0137          Latest lactate reviewed-  Recent Labs   Lab 03/15/25  2029   POCLAC 1.37         Fluid challenge Fluid Not Needed - Patient is not hypotensive and/or lactate is less than 4.0.     Post- resuscitation assessment No - Post resuscitation assessment not needed       Will Not start Pressors- Levophed for MAP of 65  Source control achieved by: antibiotics

## 2025-03-16 NOTE — ASSESSMENT & PLAN NOTE
Ovarian cyst and uterine myomas seen on CT. Will consult GYN, likely to have outpatient follow up  Will obtain transvaginal US

## 2025-03-16 NOTE — RESPIRATORY THERAPY
Latest Reference Range & Units 03/15/25 20:29   POC PH 7.35 - 7.45  7.391   POC PCO2 35 - 45 mmHg 44.1   POC PO2 40 - 60 mmHg 23 (L)   POC HCO3 24 - 28 mmol/L 26.7   POC SATURATED O2 95 - 100 % 39   Sample  VENOUS   POC TCO2 24 - 29 mmol/L 28   POC BE -2 to 2 mmol/L 2   DelSys  Room Air   Site  Other   POC Lactate 0.5 - 2.2 mmol/L 1.37   (L): Data is abnormally low

## 2025-03-16 NOTE — PLAN OF CARE
Problem: Adult Inpatient Plan of Care  Goal: Plan of Care Review  Outcome: Progressing  Goal: Patient-Specific Goal (Individualized)  Outcome: Progressing  Goal: Absence of Hospital-Acquired Illness or Injury  Outcome: Progressing  Goal: Optimal Comfort and Wellbeing  Outcome: Progressing     Problem: Bariatric Environmental Safety  Goal: Safety Maintained with Care  Outcome: Progressing     Problem: Gastrointestinal Bleeding  Goal: Hemostasis  Outcome: Progressing     Problem: Sepsis/Septic Shock  Goal: Absence of Infection Signs and Symptoms  Outcome: Progressing     Problem: Infection  Goal: Absence of Infection Signs and Symptoms  Outcome: Progressing

## 2025-03-16 NOTE — SUBJECTIVE & OBJECTIVE
"Past Medical History:   Diagnosis Date    Anemia     Back pain     CHO intolerance     Elevated BP without diagnosis of hypertension 2018    Excessive daytime sleepiness     General anesthetics causing adverse effect in therapeutic use     highly sensitive    Hypothyroidism     Hypothyroidism     Morbid obesity 2018    Obstructive sleep apnea 2019    PONV (postoperative nausea and vomiting)     Ventral hernia        Past Surgical History:   Procedure Laterality Date    BREAST SURGERY       SECTION      HERNIA REPAIR      [id number]    PANNICULECTOMY N/A 2021    Procedure: PANNICULECTOMY;  Surgeon: Alessandro Ramírez MD;  Location: [facility];  Service: Plastics;  Laterality: N/A;    WISDOM TOOTH EXTRACTION      all 4 removed       Review of patient's allergies indicates:   Allergen Reactions    Anesthetics - jake type- parabens Other (See Comments)    Codeine      sensitive       No current facility-administered medications on file prior to encounter.     Current Outpatient Medications on File Prior to Encounter   Medication Sig    butalbital-acetaminophen-caff -40 mg Cap take 1 tablet by mouth every four to six hours as needed for headache; MAX OF 4 TABLETS PER 24 HOURS.    cholecalciferol, vitamin D3, 1,250 mcg (50,000 unit) capsule TAKE 1 CAPSULE BY MOUTH ONCE WEEKLY.    fluconazole (DIFLUCAN) 150 MG Tab Take 1 tablet by mouth once as needed for antibiotic induced yeast infection.    hydroCHLOROthiazide (HYDRODIURIL) 25 MG tablet TAKE 1 TABLET BY MOUTH ONCE DAILY AS NEEDED    hydroCHLOROthiazide (HYDRODIURIL) 25 MG tablet Take one tablet by mouth once a day as needed    miSOPROStoL (CYTOTEC) 200 MCG Tab Take 1 tablet (200 mcg total) by mouth 1 hour before procedure.    pen needle, diabetic (PEN NEEDLE) 31 gauge x 1/4" Ndle 1 each by Misc.(Non-Drug; Combo Route) route once a week. Use with ozemoic    semaglutide (OZEMPIC) 0.25 mg or 0.5 mg(2 mg/1.5 mL) pen injector Inject 0.5 mg into " the skin once a week.    tirzepatide (MOUNJARO) 10 mg/0.5 mL PnIj Inject 10mg into the skin once a week for 4 weeks    tirzepatide (MOUNJARO) 12.5 mg/0.5 mL PnIj Inject 12.5 mg into the skin every 7 days.    tirzepatide (MOUNJARO) 12.5 mg/0.5 mL PnIj inject 12.5mg sc once a week for 4 weeks    tirzepatide (MOUNJARO) 15 mg/0.5 mL PnIj inject 15mg under the skin once a week for 4 weeks    tirzepatide (MOUNJARO) 15 mg/0.5 mL PnIj Inject 15 mg into the skin every 7 days.    tirzepatide (MOUNJARO) 15 mg/0.5 mL PnIj Inject 15 mg into the skin every 7 days.    tirzepatide (MOUNJARO) 15 mg/0.5 mL PnIj Inject 15mg subcutaneously into the skin once a week.    tirzepatide (MOUNJARO) 15 mg/0.5 mL PnIj Inject 15 mg into the skin every 7 days.    tirzepatide (MOUNJARO) 15 mg/0.5 mL PnIj inject 15mg into the skin once weekly    tirzepatide (MOUNJARO) 2.5 mg/0.5 mL PnIj Inject 2.5 mg into the skin once weekly for 4 weeks.    tirzepatide (MOUNJARO) 5 mg/0.5 mL PnIj INJECT 5MG UNDER THE SKIN ONCE A WEEK FOR 4 WEEKS    tirzepatide (MOUNJARO) 7.5 mg/0.5 mL PnIj Inject 7.5MG into the skin once a week    [DISCONTINUED] diltiaZEM (CARDIZEM CD) 240 MG 24 hr capsule Take 1 capsule (240 mg total) by mouth once daily.     Family History       Problem Relation (Age of Onset)    Early death Father (64)    Pulmonary fibrosis Mother          Tobacco Use    Smoking status: Never    Smokeless tobacco: Never   Substance and Sexual Activity    Alcohol use: No    Drug use: No    Sexual activity: Never     Review of Systems   Constitutional:  Positive for chills. Negative for fever.   HENT:  Negative for congestion and sore throat.    Eyes:  Negative for visual disturbance.   Respiratory:  Negative for cough and shortness of breath.    Cardiovascular:  Negative for chest pain and palpitations.   Gastrointestinal:  Positive for abdominal pain (lower abdominal intermittent cramping), blood in stool, diarrhea and nausea. Negative for vomiting.    Endocrine: Negative for cold intolerance and heat intolerance.   Genitourinary:  Negative for dysuria and hematuria.   Musculoskeletal:  Negative for arthralgias and myalgias.   Skin:  Negative for pallor and rash.   Neurological:  Negative for tremors and seizures.   Hematological:  Negative for adenopathy. Does not bruise/bleed easily.   Psychiatric/Behavioral:  Negative for hallucinations.    All other systems reviewed and are negative.    Objective:     Vital Signs (Most Recent):  Temp: 99.2 °F (37.3 °C) (03/16/25 0053)  Pulse: 97 (03/16/25 0053)  Resp: 16 (03/15/25 2029)  BP: (!) 218/99 (03/16/25 0053)  SpO2: 100 % (03/16/25 0053) Vital Signs (24h Range):  Temp:  [98.1 °F (36.7 °C)-99.2 °F (37.3 °C)] 99.2 °F (37.3 °C)  Pulse:  [] 97  Resp:  [16] 16  SpO2:  [96 %-100 %] 100 %  BP: (188-239)/() 218/99     Weight: 124.3 kg (274 lb)  Body mass index is 48.54 kg/m².     Physical Exam  Vitals and nursing note reviewed.   Constitutional:       General: She is awake. She is not in acute distress.     Appearance: Normal appearance. She is well-developed. She is not ill-appearing.   HENT:      Head: Normocephalic and atraumatic.      Mouth/Throat:      Lips: Pink.      Mouth: Mucous membranes are moist.   Eyes:      Conjunctiva/sclera: Conjunctivae normal.      Pupils: Pupils are equal, round, and reactive to light.   Neck:      Thyroid: No thyromegaly.      Vascular: No JVD.   Cardiovascular:      Rate and Rhythm: Normal rate and regular rhythm.      Heart sounds: Normal heart sounds, S1 normal and S2 normal. No murmur heard.     No friction rub. No gallop.   Pulmonary:      Effort: Pulmonary effort is normal.      Breath sounds: Normal breath sounds.   Abdominal:      General: Abdomen is protuberant. Bowel sounds are normal. There is no distension.      Palpations: Abdomen is soft. There is no mass.      Tenderness: There is abdominal tenderness in the right lower quadrant, suprapubic area and left lower  quadrant.   Musculoskeletal:         General: Normal range of motion.      Cervical back: Full passive range of motion without pain, normal range of motion and neck supple.   Skin:     General: Skin is warm and dry.      Capillary Refill: Capillary refill takes less than 2 seconds.   Neurological:      General: No focal deficit present.      Mental Status: She is alert and oriented to person, place, and time. Mental status is at baseline.      GCS: GCS eye subscore is 4. GCS verbal subscore is 5. GCS motor subscore is 6.      Cranial Nerves: No cranial nerve deficit.      Sensory: Sensation is intact.      Motor: Motor function is intact.   Psychiatric:         Behavior: Behavior normal. Behavior is cooperative.              CRANIAL NERVES     CN III, IV, VI   Pupils are equal, round, and reactive to light.       Significant Labs: All pertinent labs within the past 24 hours have been reviewed.  CBC:   Recent Labs   Lab 03/15/25  2024   WBC 15.60*   HGB 6.9*   HCT 27.2*        CMP:   Recent Labs   Lab 03/15/25  2024      K 3.8      CO2 22*   *   BUN 10   CREATININE 0.8   CALCIUM 8.9   PROT 8.7*   ALBUMIN 3.8   BILITOT 0.6   ALKPHOS 68   AST 22   ALT 8*   ANIONGAP 10       Lipase:   Recent Labs   Lab 03/15/25  2024   LIPASE 31       POCT Glucose:   Recent Labs   Lab 03/15/25  2019   POCTGLUCOSE 121*       Urine Studies:   Recent Labs   Lab 03/15/25  2225   COLORU Yellow   APPEARANCEUA Clear   PHUR 7.0   SPECGRAV >1.030*   PROTEINUA Trace*   GLUCUA Negative   KETONESU Negative   BILIRUBINUA Negative   OCCULTUA Negative   NITRITE Negative   UROBILINOGEN Negative   LEUKOCYTESUR Negative     03/15/25 2038  Beta - Hydroxybutyrate, Serum  Collected: 03/15/25 2024  Final result  Specimen: Blood    Beta-Hydroxybutyrate 0.2 mmol/L        03/15/25 2243  Ferritin  Collected: 03/15/25 2157  Final result  Specimen: Blood    Ferritin 3 Low  ng/mL            03/15/25 2242  Type & Screen  Collected:  03/15/25 2158  Final result  Specimen: Blood    Group & Rh A POS Specimen Outdate 03/18/2025 23:59   Indirect Todd NEG            03/15/25 2223  Lactate Dehydrogenase  Collected: 03/15/25 2157  Final result  Specimen: Blood     U/L         03/16/25 0014  WBC, Stool  Collected: 03/15/25 2240  Final result  Specimen: Stool    Stool WBC Many neutrophils seen Abnormal             03/15/25 2336  Rotavirus antigen, stool  Collected: 03/15/25 2240  Final result  Specimen: Stool    Rotavirus Negative            03/15/25 2256  Occult blood x 1, stool  Collected: 03/15/25 2240  Final result  Specimen: Stool    Occult Blood Positive Abnormal           Significant Imaging: I have reviewed all pertinent imaging results/findings within the past 24 hours.  Imaging Results              CT Abdomen Pelvis With IV Contrast NO Oral Contrast (Final result)  Result time 03/15/25 22:31:14      Final result by Carolyn Fontanez MD (03/15/25 22:31:14)                   Impression:      No acute abnormality in the abdomen.    Large 7 cm simple cyst in the right hemiabdomen is of uncertain etiology possibly mesenteric cyst.  This was not seen previously.    Left ovarian 2 cm cyst.    Uterine myomas.      Electronically signed by: Carolyn Fontanez  Date:    03/15/2025  Time:    22:31               Narrative:    EXAMINATION:  CT ABDOMEN PELVIS WITH IV CONTRAST    CLINICAL HISTORY:  LLQ abdominal pain;Nausea/vomiting;    TECHNIQUE:  Low dose axial images, sagittal and coronal reformations were obtained from the lung bases to the pubic symphysis following the IV administration of 100 mL of Omnipaque 350 .  Oral contrast was not administered.    COMPARISON:  Abdomen MRI 10/10/2016, CT abdomen pelvis 09/09/2016    FINDINGS:  Abdomen:    - Lower thorax:Imaged heart is not enlarged.    - Lung bases: Lung bases are clear.    - Liver: Subtle low density in the right lobe of the liver posteriorly-axial image 58 series 2 corresponds  to the prior noted lesion on MRI with the appearance of a hemangioma.  Liver is otherwise unremarkable as imaged.  The    - Gallbladder: There is no CT evidence of cholelithiasis or cholecystitis.    - Bile Ducts: No evidence of intra or extra hepatic biliary ductal dilation.    - Spleen: Negative.    - Kidneys: No mass or hydronephrosis.    - Adrenals: Unremarkable.    - Pancreas: No mass or peripancreatic fat stranding.    - Retroperitoneum:  No significant adenopathy.    - Vascular: No abdominal aortic aneurysm.    - Abdominal wall:  Postoperative changes noted along subcutaneous fat of the abdomen.  Small fat containing epigastric hernia midline..    Pelvis:    There is enlarged lobular uterus with masses compatible with myomas.  There is a 2 cm left adnexal cyst.  There is no free fluid.  Bladder is unremarkable as imaged.    Bowel/Mesentery:    There is a 7 cm simple cystic lesion in the right upper quadrant caudal to the gallbladder abutting loops of bowel of uncertain etiology possibly mesenteric cyst not previously seen.  There is no evidence of bowel obstruction or inflammation.    Bones:  No acute osseous abnormality and no suspicious lytic or blastic lesion.

## 2025-03-16 NOTE — PLAN OF CARE
[facility] - Med/Surg  Initial Discharge Assessment       Primary Care Provider: Carrie Wild MD    Admission Diagnosis: Symptomatic anemia [D64.9]    Admission Date: 3/15/2025  Expected Discharge Date:     Transition of Care Barriers: None    Payor: / [facility] pt    Information verified as correct on facesheet. The assessment was completed at the patient's bedside.  Pt lives with sister. Pt does not have a Living Will or Advance Directive. The Pt is oriented to person, places, and times. PCP last seen 02/24.  Pt denies Coumadin, dialysis, HH, and has not been readmitted into the hospital in the last thirty days. Pt has CPAP for home use.   Pt is capable of performing ADLs without assistance. Pt drives to and from medical appointments. Pt reports she takes medication as prescribed; pharmacy used [facility].  Pt verbalized plan to discharge home via family transport. Pt has no other needs to be addressed at this time. CM reviewed the chart and will continue to monitor.       Extended Emergency Contact Information  Primary Emergency Contact: Kita almazan  Mobile Phone: [phone]  Relation: Sister   needed? No    Discharge Plan A: Home with family  Discharge Plan B: Home      [id number] [address] [place] [address] AT [facility] & [address] [address]  Phone: [phone] Fax: [phone]      Initial Assessment (most recent)       Adult Discharge Assessment - 03/16/25 1234          Discharge Assessment    Assessment Type Discharge Planning Assessment     Confirmed/corrected address, phone number and insurance Yes     Confirmed Demographics Correct on Facesheet     Source of Information patient;health record     Reason For Admission Symptomatic anemia     People in Home sibling(s)     Facility Arrived From: Home     Do you expect to return to your current living situation? Yes     Do you have help at home  or someone to help you manage your care at home? Yes     Who are your caregiver(s) and their phone number(s)? --     Prior to hospitilization cognitive status: Alert/Oriented     Current cognitive status: Alert/Oriented     Walking or Climbing Stairs Difficulty no     Dressing/Bathing Difficulty no     Home Accessibility wheelchair accessible     Home Layout Able to live on 1st floor     Equipment Currently Used at Home CPAP     Readmission within 30 days? No     Patient currently being followed by outpatient case management? No     Do you currently have service(s) that help you manage your care at home? No     Do you take prescription medications? Yes     Do you have prescription coverage? Yes     Coverage [employer] and [employer] of [place]     Do you have any problems affording any of your prescribed medications? No     Is the patient taking medications as prescribed? yes     Who is going to help you get home at discharge? sister     How do you get to doctors appointments? car, drives self     Are you on dialysis? No     Do you take coumadin? No     Discharge Plan A Home with family     Discharge Plan B Home     DME Needed Upon Discharge  none     Discharge Plan discussed with: Patient     Transition of Care Barriers None

## 2025-03-16 NOTE — CONSULTS
[facility]     CC: diarrhea    HPI from H&P:  Mirlande Boogie is a 44 year old female with a past medical history of anemia, elevated BP readings without hypertension, obesity, hypothyroidism not on medication, who presented to the ED with a complaint of lower abdominal cramping, nausea, vomiting and diarrhea since Wednesday. She states that she ate a ham and cheese sandwich from [employer] that tasted off, and the following day began having abdominal cramping, nausea, vomiting and diarrhea. She reports about 4 episodes of diarrhea today, and saw blood in the specimen that she obtained when she arrived to the ED. She does report taking ibuprofen frequently, and denies hematemesis. She states that she feels dehydrated and believes she has food poisoning. She denies fever but reports chills. She states that she just ended her menstrual cycle. She denies other complaint.     ED work up included a CBC which was significant for an elevated WBC of 15.6, H/H 6.9/27.2. She has a history of anemia and hasn't been taking her oral supplements. She also ended her menstrual cycle a few days ago. 2 units of PRBC were ordered by the ED to be transfused. CMP significant for CO2 22, glucose 113. Beta hydroxybutyrate 0.2. Lipase 31, Lactate dehydrogenase 199, Ferritin 3. Urinalysis negative for evidence of infection. CT abdomen and pelvis revealed: No acute abnormality in the abdomen. Large 7 cm simple cyst in the right hemiabdomen is of uncertain etiology possibly mesenteric cyst.  This was not seen previously. Left ovarian 2 cm cyst. Uterine myomas. Blood cultures were obtained. She was initiated on Zosyn in the ED. Stool studies were collected and sent to lab. Occult blood was seen on labs, as well as many neutrophils on the stool WBC. Rotavirus negative. GI consulted, General surgery consulted. Hospital medicine consulted for admission and further management.     Patient was personally seen and examined. GI consulted  "due to concerns for GI bleed. Patient denies ever having any episodes like this in the past. She has never had a colonoscopy. She reports having an EGD 4-5 years ago in preparation for possible lap band placement but lap band ultimately never placed. She reports her last bowel movement was approximately 12 hours ago. She has not tried eating anything thus far today. She reports she has been anemic "all my life". Reports chronic NSAID use. She was given 2 u pRBCs and Hgb has improved from 6.9 to 8.3. She remains hemodynamically stable.     Past Medical History:   Diagnosis Date    Anemia     Back pain     CHO intolerance     Elevated BP without diagnosis of hypertension 2018    Excessive daytime sleepiness     General anesthetics causing adverse effect in therapeutic use     highly sensitive    Hypothyroidism     Hypothyroidism     Morbid obesity 2018    Obstructive sleep apnea 2019    PONV (postoperative nausea and vomiting)     Ventral hernia        Past Surgical History:   Procedure Laterality Date    BREAST SURGERY       SECTION      HERNIA REPAIR      [id number]    PANNICULECTOMY N/A 2021    Procedure: PANNICULECTOMY;  Surgeon: Alessandro Ramírez MD;  Location: [facility];  Service: Plastics;  Laterality: N/A;    WISDOM TOOTH EXTRACTION      all 4 removed       Social History[1]    Family History   Problem Relation Name Age of Onset    Pulmonary fibrosis Mother      Early death Father  64       Review of Systems  General ROS: reports chills, denies fever  Gastrointestinal ROS: reports diarrhea and lower abdominal cramping    Physical Examination  BP (!) 186/86 (BP Location: Left arm, Patient Position: Lying)   Pulse 86   Temp 98.2 °F (36.8 °C) (Oral)   Resp 18   Ht 5' 3" (1.6 m)   Wt 130.7 kg (288 lb 2.3 oz)   LMP 03/10/2025   SpO2 97%   Breastfeeding No   BMI 51.04 kg/m²   General appearance: alert, cooperative, no distress  Abdomen: mildly TTP in the lower abdomen; no rebound, no " [facility]    Labs:  Lab Results   Component Value Date    WBC 15.60 (H) 03/16/2025    HGB 8.3 (L) 03/16/2025    HCT 29.3 (L) 03/16/2025    MCV 62 (L) 03/16/2025     03/16/2025       CMP  Sodium   Date Value Ref Range Status   03/16/2025 137 136 - 145 mmol/L Final     Potassium   Date Value Ref Range Status   03/16/2025 3.3 (L) 3.5 - 5.1 mmol/L Final     Chloride   Date Value Ref Range Status   03/16/2025 106 95 - 110 mmol/L Final     CO2   Date Value Ref Range Status   03/16/2025 23 23 - 29 mmol/L Final     Glucose   Date Value Ref Range Status   03/16/2025 100 70 - 110 mg/dL Final     BUN   Date Value Ref Range Status   03/16/2025 9 6 - 20 mg/dL Final     Creatinine   Date Value Ref Range Status   03/16/2025 0.8 0.5 - 1.4 mg/dL Final     Calcium   Date Value Ref Range Status   03/16/2025 8.6 (L) 8.7 - 10.5 mg/dL Final     Total Protein   Date Value Ref Range Status   03/16/2025 7.9 6.0 - 8.4 g/dL Final     Albumin   Date Value Ref Range Status   03/16/2025 3.4 (L) 3.5 - 5.2 g/dL Final     Total Bilirubin   Date Value Ref Range Status   03/16/2025 3.0 (H) 0.1 - 1.0 mg/dL Final     Comment:     For infants and newborns, interpretation of results should be based  on gestational age, weight and in agreement with clinical  observations.    Premature Infant recommended reference ranges:  Up to 24 hours.............<8.0 mg/dL  Up to 48 hours............<12.0 mg/dL  3-5 days..................<15.0 mg/dL  6-29 days.................<15.0 mg/dL       Alkaline Phosphatase   Date Value Ref Range Status   03/16/2025 67 40 - 150 U/L Final     AST   Date Value Ref Range Status   03/16/2025 19 10 - 40 U/L Final     ALT   Date Value Ref Range Status   03/16/2025 7 (L) 10 - 44 U/L Final     Anion Gap   Date Value Ref Range Status   03/16/2025 8 8 - 16 mmol/L Final     eGFR   Date Value Ref Range Status   03/16/2025 >60 >60 mL/min/1.73 m^2 Final         Imaging:  3/15/25 CT A/P with iv contrast  Impression:     No acute  abnormality in the abdomen.     Large 7 cm simple cyst in the right hemiabdomen is of uncertain etiology possibly mesenteric cyst.  This was not seen previously.     Left ovarian 2 cm cyst.     Uterine myomas.      Assessment:   -Acute diarrhea, suspect infectious  -Blood in stool, likely related to acute infection vs hemorrhoidal source  -Acute on chronic anemia, suspect d/t recent menstrual losses vs possible chronic GYN losses vs chronic GI losses  -Abdominal cyst  -Uterine myomas    Plan:  -Will plan for EGD tomorrow  -Recommend outpatient colonoscopy. She is unlikely to tolerate bowel prep presently.  -Supportive care with IV fluids and antiemetics as needed  -Avoid NSAIDs  -Empiric PPI  -Advance diet as tolerated. NPO after midnight.  -Gen Surg consulted for further evaluation of abdominal cyst.    Milind Voss DO           [1]   Social History  Tobacco Use    Smoking status: Never    Smokeless tobacco: Never   Substance Use Topics    Alcohol use: No    Drug use: No

## 2025-03-16 NOTE — HPI
Mirlande Boogie is a 44 year old female with a past medical history of anemia, elevated BP readings without hypertension, obesity, hypothyroidism not on medication, who presented to the ED with a complaint of lower abdominal cramping, nausea, vomiting and diarrhea since Wednesday. She states that she ate a ham and cheese sandwich from [employer] that tasted off, and the following day began having abdominal cramping, nausea, vomiting and diarrhea. She reports about 4 episodes of diarrhea today, and saw blood in the specimen that she obtained when she arrived to the ED. She does report taking ibuprofen frequently, and denies hematemesis. She states that she feels dehydrated and believes she has food poisoning. She denies fever but reports chills. She states that she just ended her menstrual cycle. She denies other complaint.    ED work up included a CBC which was significant for an elevated WBC of 15.6, H/H 6.9/27.2. She has a history of anemia and hasn't been taking her oral supplements. She also ended her menstrual cycle a few days ago. 2 units of PRBC were ordered by the ED to be transfused. CMP significant for CO2 22, glucose 113. Beta hydroxybutyrate 0.2. Lipase 31, Lactate dehydrogenase 199, Ferritin 3. Urinalysis negative for evidence of infection. CT abdomen and pelvis revealed: No acute abnormality in the abdomen. Large 7 cm simple cyst in the right hemiabdomen is of uncertain etiology possibly mesenteric cyst.  This was not seen previously. Left ovarian 2 cm cyst. Uterine myomas. Blood cultures were obtained. She was initiated on Zosyn in the ED. Stool studies were collected and sent to lab. Occult blood was seen on labs, as well as many neutrophils on the stool WBC. Rotavirus negative. GI consulted, General surgery consulted. Hospital medicine consulted for admission and further management.

## 2025-03-17 ENCOUNTER — ANESTHESIA (OUTPATIENT)
Dept: ENDOSCOPY | Facility: HOSPITAL | Age: 45
End: 2025-03-17
Payer: COMMERCIAL

## 2025-03-17 ENCOUNTER — ANESTHESIA EVENT (OUTPATIENT)
Dept: ENDOSCOPY | Facility: HOSPITAL | Age: 45
End: 2025-03-17
Payer: COMMERCIAL

## 2025-03-17 PROBLEM — A41.9 SEPSIS: Status: RESOLVED | Noted: 2025-03-16 | Resolved: 2025-03-17

## 2025-03-17 PROBLEM — K52.9 ACUTE GASTROENTERITIS: Status: ACTIVE | Noted: 2025-03-17

## 2025-03-17 LAB
ALBUMIN SERPL BCP-MCNC: 3.4 G/DL (ref 3.5–5.2)
ALP SERPL-CCNC: 66 U/L (ref 40–150)
ALT SERPL W/O P-5'-P-CCNC: 8 U/L (ref 10–44)
ANION GAP SERPL CALC-SCNC: 10 MMOL/L (ref 8–16)
ANISOCYTOSIS BLD QL SMEAR: ABNORMAL
AST SERPL-CCNC: 18 U/L (ref 10–40)
BASOPHILS # BLD AUTO: ABNORMAL K/UL (ref 0–0.2)
BASOPHILS NFR BLD: 0 % (ref 0–1.9)
BILIRUB SERPL-MCNC: 1.7 MG/DL (ref 0.1–1)
BUN SERPL-MCNC: 9 MG/DL (ref 6–20)
CALCIUM SERPL-MCNC: 8.8 MG/DL (ref 8.7–10.5)
CHLORIDE SERPL-SCNC: 102 MMOL/L (ref 95–110)
CO2 SERPL-SCNC: 25 MMOL/L (ref 23–29)
CREAT SERPL-MCNC: 0.8 MG/DL (ref 0.5–1.4)
DIFFERENTIAL METHOD BLD: ABNORMAL
EOSINOPHIL # BLD AUTO: ABNORMAL K/UL (ref 0–0.5)
EOSINOPHIL NFR BLD: 1 % (ref 0–8)
ERYTHROCYTE [DISTWIDTH] IN BLOOD BY AUTOMATED COUNT: 27.5 % (ref 11.5–14.5)
EST. GFR  (NO RACE VARIABLE): >60 ML/MIN/1.73 M^2
GLUCOSE SERPL-MCNC: 81 MG/DL (ref 70–110)
HCT VFR BLD AUTO: 29.5 % (ref 37–48.5)
HGB BLD-MCNC: 8.4 G/DL (ref 12–16)
HYPOCHROMIA BLD QL SMEAR: ABNORMAL
IMM GRANULOCYTES # BLD AUTO: ABNORMAL K/UL (ref 0–0.04)
IMM GRANULOCYTES NFR BLD AUTO: ABNORMAL % (ref 0–0.5)
LYMPHOCYTES # BLD AUTO: ABNORMAL K/UL (ref 1–4.8)
LYMPHOCYTES NFR BLD: 18 % (ref 18–48)
MAGNESIUM SERPL-MCNC: 2 MG/DL (ref 1.6–2.6)
MCH RBC QN AUTO: 17.8 PG (ref 27–31)
MCHC RBC AUTO-ENTMCNC: 28.5 G/DL (ref 32–36)
MCV RBC AUTO: 63 FL (ref 82–98)
MONOCYTES # BLD AUTO: ABNORMAL K/UL (ref 0.3–1)
MONOCYTES NFR BLD: 6 % (ref 4–15)
NEUTROPHILS NFR BLD: 70 % (ref 38–73)
NEUTS BAND NFR BLD MANUAL: 5 %
NRBC BLD-RTO: 0 /100 WBC
OHS QRS DURATION: 98 MS
OHS QTC CALCULATION: 464 MS
OVALOCYTES BLD QL SMEAR: ABNORMAL
PHOSPHATE SERPL-MCNC: 3.3 MG/DL (ref 2.7–4.5)
PLATELET # BLD AUTO: 367 K/UL (ref 150–450)
PLATELET BLD QL SMEAR: ABNORMAL
PMV BLD AUTO: ABNORMAL FL (ref 9.2–12.9)
POCT GLUCOSE: 78 MG/DL (ref 70–110)
POCT GLUCOSE: 79 MG/DL (ref 70–110)
POIKILOCYTOSIS BLD QL SMEAR: SLIGHT
POTASSIUM SERPL-SCNC: 3.5 MMOL/L (ref 3.5–5.1)
PROT SERPL-MCNC: 7.9 G/DL (ref 6–8.4)
RBC # BLD AUTO: 4.72 M/UL (ref 4–5.4)
SODIUM SERPL-SCNC: 137 MMOL/L (ref 136–145)
WBC # BLD AUTO: 16.55 K/UL (ref 3.9–12.7)

## 2025-03-17 PROCEDURE — 85027 COMPLETE CBC AUTOMATED: CPT | Performed by: NURSE PRACTITIONER

## 2025-03-17 PROCEDURE — G0378 HOSPITAL OBSERVATION PER HR: HCPCS

## 2025-03-17 PROCEDURE — 96375 TX/PRO/DX INJ NEW DRUG ADDON: CPT

## 2025-03-17 PROCEDURE — 25000003 PHARM REV CODE 250: Performed by: NURSE PRACTITIONER

## 2025-03-17 PROCEDURE — 36415 COLL VENOUS BLD VENIPUNCTURE: CPT | Performed by: EMERGENCY MEDICINE

## 2025-03-17 PROCEDURE — 27201012 HC FORCEPS, HOT/COLD, DISP: Performed by: STUDENT IN AN ORGANIZED HEALTH CARE EDUCATION/TRAINING PROGRAM

## 2025-03-17 PROCEDURE — 96376 TX/PRO/DX INJ SAME DRUG ADON: CPT

## 2025-03-17 PROCEDURE — 80053 COMPREHEN METABOLIC PANEL: CPT | Performed by: NURSE PRACTITIONER

## 2025-03-17 PROCEDURE — 83735 ASSAY OF MAGNESIUM: CPT | Performed by: NURSE PRACTITIONER

## 2025-03-17 PROCEDURE — 85007 BL SMEAR W/DIFF WBC COUNT: CPT | Performed by: NURSE PRACTITIONER

## 2025-03-17 PROCEDURE — 43239 EGD BIOPSY SINGLE/MULTIPLE: CPT | Mod: ,,, | Performed by: STUDENT IN AN ORGANIZED HEALTH CARE EDUCATION/TRAINING PROGRAM

## 2025-03-17 PROCEDURE — 63600175 PHARM REV CODE 636 W HCPCS: Performed by: NURSE PRACTITIONER

## 2025-03-17 PROCEDURE — 63600175 PHARM REV CODE 636 W HCPCS: Performed by: INTERNAL MEDICINE

## 2025-03-17 PROCEDURE — 25000003 PHARM REV CODE 250: Performed by: INTERNAL MEDICINE

## 2025-03-17 PROCEDURE — 25000003 PHARM REV CODE 250: Performed by: STUDENT IN AN ORGANIZED HEALTH CARE EDUCATION/TRAINING PROGRAM

## 2025-03-17 PROCEDURE — 37000008 HC ANESTHESIA 1ST 15 MINUTES: Performed by: STUDENT IN AN ORGANIZED HEALTH CARE EDUCATION/TRAINING PROGRAM

## 2025-03-17 PROCEDURE — 43239 EGD BIOPSY SINGLE/MULTIPLE: CPT | Performed by: STUDENT IN AN ORGANIZED HEALTH CARE EDUCATION/TRAINING PROGRAM

## 2025-03-17 PROCEDURE — 63600175 PHARM REV CODE 636 W HCPCS: Performed by: NURSE ANESTHETIST, CERTIFIED REGISTERED

## 2025-03-17 PROCEDURE — 96374 THER/PROPH/DIAG INJ IV PUSH: CPT | Mod: 59

## 2025-03-17 PROCEDURE — 84100 ASSAY OF PHOSPHORUS: CPT | Performed by: NURSE PRACTITIONER

## 2025-03-17 PROCEDURE — 0DB68ZX EXCISION OF STOMACH, VIA NATURAL OR ARTIFICIAL OPENING ENDOSCOPIC, DIAGNOSTIC: ICD-10-PCS | Performed by: INTERNAL MEDICINE

## 2025-03-17 PROCEDURE — 36415 COLL VENOUS BLD VENIPUNCTURE: CPT | Performed by: NURSE PRACTITIONER

## 2025-03-17 RX ORDER — SODIUM CHLORIDE 9 MG/ML
INJECTION, SOLUTION INTRAVENOUS CONTINUOUS
Status: DISCONTINUED | OUTPATIENT
Start: 2025-03-17 | End: 2025-03-19

## 2025-03-17 RX ORDER — PROPOFOL 10 MG/ML
VIAL (ML) INTRAVENOUS
Status: DISCONTINUED | OUTPATIENT
Start: 2025-03-17 | End: 2025-03-17

## 2025-03-17 RX ORDER — LIDOCAINE HYDROCHLORIDE 20 MG/ML
INJECTION INTRAVENOUS
Status: DISCONTINUED | OUTPATIENT
Start: 2025-03-17 | End: 2025-03-17

## 2025-03-17 RX ADMIN — LIDOCAINE HYDROCHLORIDE 100 MG: 20 INJECTION, SOLUTION INTRAVENOUS at 01:03

## 2025-03-17 RX ADMIN — PANTOPRAZOLE SODIUM 40 MG: 40 INJECTION, POWDER, FOR SOLUTION INTRAVENOUS at 08:03

## 2025-03-17 RX ADMIN — ACETAMINOPHEN 650 MG: 325 TABLET ORAL at 02:03

## 2025-03-17 RX ADMIN — PROPOFOL 50 MG: 10 INJECTION, EMULSION INTRAVENOUS at 01:03

## 2025-03-17 RX ADMIN — POTASSIUM BICARBONATE 50 MEQ: 977.5 TABLET, EFFERVESCENT ORAL at 02:03

## 2025-03-17 RX ADMIN — SODIUM CHLORIDE: 9 INJECTION, SOLUTION INTRAVENOUS at 01:03

## 2025-03-17 RX ADMIN — PROPOFOL 100 MG: 10 INJECTION, EMULSION INTRAVENOUS at 01:03

## 2025-03-17 RX ADMIN — HYDROCHLOROTHIAZIDE 25 MG: 25 TABLET ORAL at 08:03

## 2025-03-17 RX ADMIN — HYDROMORPHONE HYDROCHLORIDE 1 MG: 1 INJECTION, SOLUTION INTRAMUSCULAR; INTRAVENOUS; SUBCUTANEOUS at 08:03

## 2025-03-17 RX ADMIN — HYDRALAZINE HYDROCHLORIDE 10 MG: 20 INJECTION, SOLUTION INTRAMUSCULAR; INTRAVENOUS at 04:03

## 2025-03-17 NOTE — ASSESSMENT & PLAN NOTE
Ovarian cyst and uterine myomas seen on CT.   Patient already had outpatient pelvic US, she defer to follow up outpatient

## 2025-03-17 NOTE — ASSESSMENT & PLAN NOTE
Iron deficiency anemia   Likely due to menorrhagia   Rule out GI bleeding     Recent Labs     03/16/25  1132 03/16/25 2007 03/17/25  0329   HGB 8.3* 8.4* 8.4*   HCT 29.3* 30.8* 29.5*     Plan  - Monitor serial CBC: Daily  - Transfuse PRBC if patient becomes hemodynamically unstable, symptomatic or H/H drops below 7/21.  - Patient has received 1 units of PRBCs on 3/16/25  - Patient's anemia is currently stable  - planned for EGD today   - Patient has outpatient GYN follow up and planned for tubal libation and ablation   - iron supplements on discharge

## 2025-03-17 NOTE — PLAN OF CARE
Pt. Transferred back to room 213 per w/c.  VSS, tolerating po juice, denies pain or nausea.  Recovered to baseline post anesthesia.  Report given to nurse Sesay.

## 2025-03-17 NOTE — ASSESSMENT & PLAN NOTE
GI consulted   Some blood mixed stools likely due to gastroenteritis   Cont Protonix BID   EGD today   Outpatient colonoscopy

## 2025-03-17 NOTE — PROGRESS NOTES
[facility] Medicine  Progress Note    Patient Name: Mirlande Boogie  MRN: [id number]  Patient Class: OP- Observation   Admission Date: 3/15/2025  Length of Stay: 0 days  Attending Physician: Jose Ramon Valladares MD  Primary Care Provider: Carrie Wild MD        Subjective     Principal Problem:Symptomatic anemia        HPI:    Mirlande Boogie is a 44 year old female with a past medical history of anemia, elevated BP readings without hypertension, obesity, hypothyroidism not on medication, who presented to the ED with a complaint of lower abdominal cramping, nausea, vomiting and diarrhea since Wednesday. She states that she ate a ham and cheese sandwich from [employer] that tasted off, and the following day began having abdominal cramping, nausea, vomiting and diarrhea. She reports about 4 episodes of diarrhea today, and saw blood in the specimen that she obtained when she arrived to the ED. She does report taking ibuprofen frequently, and denies hematemesis. She states that she feels dehydrated and believes she has food poisoning. She denies fever but reports chills. She states that she just ended her menstrual cycle. She denies other complaint.    ED work up included a CBC which was significant for an elevated WBC of 15.6, H/H 6.9/27.2. She has a history of anemia and hasn't been taking her oral supplements. She also ended her menstrual cycle a few days ago. 2 units of PRBC were ordered by the ED to be transfused. CMP significant for CO2 22, glucose 113. Beta hydroxybutyrate 0.2. Lipase 31, Lactate dehydrogenase 199, Ferritin 3. Urinalysis negative for evidence of infection. CT abdomen and pelvis revealed: No acute abnormality in the abdomen. Large 7 cm simple cyst in the right hemiabdomen is of uncertain etiology possibly mesenteric cyst.  This was not seen previously. Left ovarian 2 cm cyst. Uterine myomas. Blood cultures were obtained. She was initiated on Zosyn in the ED. Stool  studies were collected and sent to lab. Occult blood was seen on labs, as well as many neutrophils on the stool WBC. Rotavirus negative. GI consulted, General surgery consulted. Hospital medicine consulted for admission and further management.     Overview/Hospital Course:  Patient is a 44 year old female with history of anemia, hypothyroidism and obesity admitted after abdominal cramps, diarrhea with blood and nausea and vomiting likely due to food poisoning. Patient initially received Zosyn, but this was stopped on D1. Her stools was positive for occult blood and many WBC. Patient has baseline Hb around 8, Hb was 6.9 on admission. Patient reports menorrhagia on her last period, also had a recent vaginal biopsy and planned for tubal ligation and ablation outpatient. Patient was transfused and Hb is stable. GI is consulted and planned for EGD. Patient was also found to have 7 cm incidental cyst and general surgery was consulted. No interventions planned for now. She was treated symptomatically for nausea, vomiting and diarrhea.      Interval History: states that she feels better. Pain is controlled with meds. She has not had a BM in 12 hrs. She is awaiting EGD today. Has not eaten anything in few days and she is not hungry.     Review of Systems  Objective:     Vital Signs (Most Recent):  Temp: 98.4 °F (36.9 °C) (03/17/25 1145)  Pulse: 88 (03/17/25 1145)  Resp: 18 (03/17/25 1145)  BP: (!) 166/88 (03/17/25 1145)  SpO2: 95 % (03/17/25 1145) Vital Signs (24h Range):  Temp:  [97.6 °F (36.4 °C)-98.5 °F (36.9 °C)] 98.4 °F (36.9 °C)  Pulse:  [78-89] 88  Resp:  [18-20] 18  SpO2:  [92 %-98 %] 95 %  BP: (139-184)/(75-95) 166/88     Weight: 130.7 kg (288 lb 2.3 oz)  Body mass index is 51.04 kg/m².    Intake/Output Summary (Last 24 hours) at 3/17/2025 1148  Last data filed at 3/16/2025 1700  Gross per 24 hour   Intake 60 ml   Output --   Net 60 ml         Physical Exam  Vitals and nursing note reviewed.   Constitutional:        General: She is not in acute distress.     Appearance: She is obese. She is not toxic-appearing.   HENT:      Head: Atraumatic.      Mouth/Throat:      Mouth: Mucous membranes are moist.      Pharynx: Oropharynx is clear.   Eyes:      General: No scleral icterus.     Conjunctiva/sclera: Conjunctivae normal.      Pupils: Pupils are equal, round, and reactive to light.   Cardiovascular:      Rate and Rhythm: Normal rate and regular rhythm.      Heart sounds: No murmur heard.  Pulmonary:      Effort: No respiratory distress.      Breath sounds: No wheezing, rhonchi or rales.   Abdominal:      General: Abdomen is flat. Bowel sounds are normal.      Palpations: Abdomen is soft.      Tenderness: There is abdominal tenderness (diffuse, mild).   Musculoskeletal:         General: No swelling or deformity.      Cervical back: No rigidity or tenderness.   Skin:     Coloration: Skin is not jaundiced or pale.      Findings: No bruising, erythema or rash.   Neurological:      General: No focal deficit present.      Mental Status: She is alert and oriented to person, place, and time.      Cranial Nerves: No cranial nerve deficit.      Sensory: No sensory deficit.      Motor: No weakness.   Psychiatric:         Mood and Affect: Mood normal.         Behavior: Behavior normal.               Significant Labs: All pertinent labs within the past 24 hours have been reviewed.  CBC:   Recent Labs   Lab 03/16/25  1132 03/16/25  2007 03/17/25  0329   WBC 15.60* 15.27* 16.55*   HGB 8.3* 8.4* 8.4*   HCT 29.3* 30.8* 29.5*    377 367     CMP:   Recent Labs   Lab 03/15/25  2024 03/16/25  1019 03/17/25  0329    137 137   K 3.8 3.3* 3.5    106 102   CO2 22* 23 25   * 100 81   BUN 10 9 9   CREATININE 0.8 0.8 0.8   CALCIUM 8.9 8.6* 8.8   PROT 8.7* 7.9 7.9   ALBUMIN 3.8 3.4* 3.4*   BILITOT 0.6 3.0* 1.7*   ALKPHOS 68 67 66   AST 22 19 18   ALT 8* 7* 8*   ANIONGAP 10 8 10       Significant Imaging: I have reviewed all pertinent  imaging results/findings within the past 24 hours.      Assessment & Plan  Symptomatic anemia  Iron deficiency anemia   Likely due to menorrhagia   Rule out GI bleeding     Recent Labs     03/16/25  1132 03/16/25 2007 03/17/25  0329   HGB 8.3* 8.4* 8.4*   HCT 29.3* 30.8* 29.5*     Plan  - Monitor serial CBC: Daily  - Transfuse PRBC if patient becomes hemodynamically unstable, symptomatic or H/H drops below 7/21.  - Patient has received 1 units of PRBCs on 3/16/25  - Patient's anemia is currently stable  - planned for EGD today   - Patient has outpatient GYN follow up and planned for tubal libation and ablation   - iron supplements on discharge   Class 3 severe obesity with body mass index (BMI) of 45.0 to 49.9 in adult  Body mass index is 51.04 kg/m². Morbid obesity complicates all aspects of disease management from diagnostic modalities to treatment. Weight loss encouraged and health benefits explained to patient.       HTN (hypertension)  Patient's blood pressure range in the last 24 hours was: BP  Min: 139/75  Max: 184/95.The patient's inpatient anti-hypertensive regimen is listed below:  Current Antihypertensives  hydroCHLOROthiazide tablet 25 mg, Daily, Oral  hydrALAZINE injection 10 mg, Every 6 hours PRN, Intravenous    Plan  - cont above   GI bleed  GI consulted   Some blood mixed stools likely due to gastroenteritis   Cont Protonix BID   EGD today   Outpatient colonoscopy   Mesenteric cyst  CT abdomen and pelvis shows: Large 7 cm simple cyst in the right hemiabdomen is of uncertain etiology possibly mesenteric cyst. This was not seen previously.   Consulted general surgery >> no interventions for now     Ovarian cyst  Ovarian cyst and uterine myomas seen on CT.   Patient already had outpatient pelvic US, she defer to follow up outpatient     Acute gastroenteritis  Stool occult positive   C diff negative   DC zosyn   Cont symptomatic Rx   EGD today       VTE Risk Mitigation (From admission, onward)            Ordered     IP VTE HIGH RISK PATIENT  Once         03/16/25 0028     Place sequential compression device  Until discontinued         03/16/25 0028     Reason for No Pharmacological VTE Prophylaxis  Once        Question:  Reasons:  Answer:  Active Bleeding    03/16/25 0011     Place sequential compression device  Until discontinued         03/16/25 0011                    Discharge Planning   CUCO: 3/18/2025     Code Status: Full Code   Medical Readiness for Discharge Date:   Discharge Plan A: Home with family                        Jose Ramon Valladares MD  Department of Hospital Medicine   [facility]

## 2025-03-17 NOTE — H&P
"History & Physical - Short Stay  Gastroenterology      SUBJECTIVE:     Procedure: EGD    Chief Complaint/Indication for Procedure: Epigastric Pain    PTA Medications   Medication Sig    fluconazole (DIFLUCAN) 150 MG Tab Take 1 tablet by mouth once as needed for antibiotic induced yeast infection.    hydroCHLOROthiazide (HYDRODIURIL) 25 MG tablet TAKE 1 TABLET BY MOUTH ONCE DAILY AS NEEDED    hydroCHLOROthiazide (HYDRODIURIL) 25 MG tablet Take one tablet by mouth once a day as needed    miSOPROStoL (CYTOTEC) 200 MCG Tab Take 1 tablet (200 mcg total) by mouth 1 hour before procedure.    tirzepatide (MOUNJARO) 15 mg/0.5 mL PnIj Inject 15mg subcutaneously into the skin once a week.    tirzepatide (MOUNJARO) 15 mg/0.5 mL PnIj Inject 15 mg into the skin every 7 days.    tirzepatide (MOUNJARO) 15 mg/0.5 mL PnIj inject 15mg into the skin once weekly    butalbital-acetaminophen-caff -40 mg Cap take 1 tablet by mouth every four to six hours as needed for headache; MAX OF 4 TABLETS PER 24 HOURS.    cholecalciferol, vitamin D3, 1,250 mcg (50,000 unit) capsule TAKE 1 CAPSULE BY MOUTH ONCE WEEKLY.    pen needle, diabetic (PEN NEEDLE) 31 gauge x 1/4" Ndle 1 each by Misc.(Non-Drug; Combo Route) route once a week. Use with ozemoic    semaglutide (OZEMPIC) 0.25 mg or 0.5 mg(2 mg/1.5 mL) pen injector Inject 0.5 mg into the skin once a week.    tirzepatide (MOUNJARO) 10 mg/0.5 mL PnIj Inject 10mg into the skin once a week for 4 weeks    tirzepatide (MOUNJARO) 12.5 mg/0.5 mL PnIj Inject 12.5 mg into the skin every 7 days.    tirzepatide (MOUNJARO) 12.5 mg/0.5 mL PnIj inject 12.5mg sc once a week for 4 weeks    tirzepatide (MOUNJARO) 15 mg/0.5 mL PnIj inject 15mg under the skin once a week for 4 weeks    tirzepatide (MOUNJARO) 15 mg/0.5 mL PnIj Inject 15 mg into the skin every 7 days.    tirzepatide (MOUNJARO) 15 mg/0.5 mL PnIj Inject 15 mg into the skin every 7 days.    tirzepatide (MOUNJARO) 2.5 mg/0.5 mL PnIj Inject 2.5 mg into " the skin once weekly for 4 weeks.    tirzepatide (MOUNJARO) 5 mg/0.5 mL PnIj INJECT 5MG UNDER THE SKIN ONCE A WEEK FOR 4 WEEKS    tirzepatide (MOUNJARO) 7.5 mg/0.5 mL PnIj Inject 7.5MG into the skin once a week       Review of patient's allergies indicates:   Allergen Reactions    Anesthetics - jake type- parabens Other (See Comments)    Codeine      sensitive        Past Medical History:   Diagnosis Date    Anemia     Back pain     CHO intolerance     Elevated BP without diagnosis of hypertension 2018    Excessive daytime sleepiness     General anesthetics causing adverse effect in therapeutic use     highly sensitive    Hypothyroidism     Hypothyroidism     Morbid obesity 2018    Obstructive sleep apnea     PONV (postoperative nausea and vomiting)     Ventral hernia      Past Surgical History:   Procedure Laterality Date    BREAST SURGERY       SECTION      HERNIA REPAIR      [id number]    PANNICULECTOMY N/A 2021    Procedure: PANNICULECTOMY;  Surgeon: Alessandro Ramírez MD;  Location: [facility];  Service: Plastics;  Laterality: N/A;    WISDOM TOOTH EXTRACTION      all 4 removed     Family History   Problem Relation Name Age of Onset    Pulmonary fibrosis Mother      Early death Father  64     Social History[1]      OBJECTIVE:     Vital Signs (Most Recent)  Temp: 98.4 °F (36.9 °C) (25 1256)  Pulse: 80 (25 1256)  Resp: 18 (25 1256)  BP: (!) 172/91 (25 1256)  SpO2: 100 % (25 1256)    Physical Exam:                                                       GENERAL:  Comfortable, in no acute distress.                                 HEENT EXAM:  Nonicteric.  No adenopathy.  Oropharynx is clear.               NECK:  Supple.                                                               LUNGS:  Clear.                                                               CARDIAC:  Regular rate and rhythm.  S1, S2.  No murmur.                      ABDOMEN:  Soft, positive bowel  sounds, nontender.  No hepatosplenomegaly or masses.  No rebound or guarding.                                             EXTREMITIES:  No edema.     MENTAL STATUS:  Normal, alert and oriented.      ASSESSMENT/PLAN:     Assessment: Epigastric Pain    Plan: EGD    Anesthesia Plan: MAC    ASA Grade: ASA 2 - Patient with mild systemic disease with no functional limitations    MALLAMPATI SCORE:  I (soft palate, uvula, fauces, and tonsillar pillars visible)           [1]   Social History  Tobacco Use    Smoking status: Never    Smokeless tobacco: Never   Substance Use Topics    Alcohol use: No    Drug use: No

## 2025-03-17 NOTE — ANESTHESIA PREPROCEDURE EVALUATION
03/17/2025  Mirlande Boogie is a 44 y.o., female.      Pre-op Assessment    I have reviewed the Patient Summary Reports.    I have reviewed the NPO Status.   I have reviewed the Medications.     Review of Systems  Hematology/Oncology:       -- Anemia:                                  Cardiovascular:     Hypertension           hyperlipidemia                         Hypertension         Pulmonary:        Sleep Apnea     Obstructive Sleep Apnea (ASAEL).           Hepatic/GI:        GI Bleed             Endocrine:   Hypothyroidism       Hypothyroidism        Morbid Obesity / BMI > 40      Physical Exam  General: Well nourished    Airway:  Mallampati: III   Mouth Opening: Normal  TM Distance: Normal  Neck ROM: Normal ROM    Dental:  Braces        Anesthesia Plan  Type of Anesthesia, risks & benefits discussed:    Anesthesia Type: Gen Natural Airway  Intra-op Monitoring Plan: Standard ASA Monitors  Induction:  IV  Informed Consent: Informed consent signed with the Patient and all parties understand the risks and agree with anesthesia plan.  All questions answered.   ASA Score: 3    Ready For Surgery From Anesthesia Perspective.     .

## 2025-03-17 NOTE — HOSPITAL COURSE
Patient is a 44 year old female with history of anemia, hypothyroidism and obesity admitted after abdominal cramps, diarrhea with blood and nausea and vomiting likely due to food poisoning. Patient initially received Zosyn, but this was stopped on D1. Her stools was positive for occult blood and many WBC. Patient has baseline Hb around 8, Hb was 6.9 on admission. Patient reports menorrhagia on her last period, also had a recent vaginal biopsy and planned for tubal ligation and ablation outpatient. Patient was transfused and Hb is stable. GI is consulted and planned for EGD. Patient was also found to have 7 cm incidental cyst and general surgery was consulted. She was treated symptomatically for nausea, vomiting and diarrhea.  EGD shows gastritis but otherwise unremarkable. Patient was started on clear liquid diet. Patient underwent IR drainage of the abdominal cyst 3/18. Fluid sent for culture and gram stain which shows no WBC or no organisms. Surgery signed off. Diet advanced to low fiber and patient tolerating well.     Patient seen and examined on the day of discharge. Abdominal pain, nausea, vomiting and diarrhea improved. Patient is appropriate for discharge today.

## 2025-03-17 NOTE — SUBJECTIVE & OBJECTIVE
Interval History: states that she feels better. Pain is controlled with meds. She has not had a BM in 12 hrs. She is awaiting EGD today. Has not eaten anything in few days and she is not hungry.     Review of Systems  Objective:     Vital Signs (Most Recent):  Temp: 98.4 °F (36.9 °C) (03/17/25 1145)  Pulse: 88 (03/17/25 1145)  Resp: 18 (03/17/25 1145)  BP: (!) 166/88 (03/17/25 1145)  SpO2: 95 % (03/17/25 1145) Vital Signs (24h Range):  Temp:  [97.6 °F (36.4 °C)-98.5 °F (36.9 °C)] 98.4 °F (36.9 °C)  Pulse:  [78-89] 88  Resp:  [18-20] 18  SpO2:  [92 %-98 %] 95 %  BP: (139-184)/(75-95) 166/88     Weight: 130.7 kg (288 lb 2.3 oz)  Body mass index is 51.04 kg/m².    Intake/Output Summary (Last 24 hours) at 3/17/2025 1148  Last data filed at 3/16/2025 1700  Gross per 24 hour   Intake 60 ml   Output --   Net 60 ml         Physical Exam  Vitals and nursing note reviewed.   Constitutional:       General: She is not in acute distress.     Appearance: She is obese. She is not toxic-appearing.   HENT:      Head: Atraumatic.      Mouth/Throat:      Mouth: Mucous membranes are moist.      Pharynx: Oropharynx is clear.   Eyes:      General: No scleral icterus.     Conjunctiva/sclera: Conjunctivae normal.      Pupils: Pupils are equal, round, and reactive to light.   Cardiovascular:      Rate and Rhythm: Normal rate and regular rhythm.      Heart sounds: No murmur heard.  Pulmonary:      Effort: No respiratory distress.      Breath sounds: No wheezing, rhonchi or rales.   Abdominal:      General: Abdomen is flat. Bowel sounds are normal.      Palpations: Abdomen is soft.      Tenderness: There is abdominal tenderness (diffuse, mild).   Musculoskeletal:         General: No swelling or deformity.      Cervical back: No rigidity or tenderness.   Skin:     Coloration: Skin is not jaundiced or pale.      Findings: No bruising, erythema or rash.   Neurological:      General: No focal deficit present.      Mental Status: She is alert and  oriented to person, place, and time.      Cranial Nerves: No cranial nerve deficit.      Sensory: No sensory deficit.      Motor: No weakness.   Psychiatric:         Mood and Affect: Mood normal.         Behavior: Behavior normal.               Significant Labs: All pertinent labs within the past 24 hours have been reviewed.  CBC:   Recent Labs   Lab 03/16/25  1132 03/16/25 2007 03/17/25  0329   WBC 15.60* 15.27* 16.55*   HGB 8.3* 8.4* 8.4*   HCT 29.3* 30.8* 29.5*    377 367     CMP:   Recent Labs   Lab 03/15/25  2024 03/16/25  1019 03/17/25 0329    137 137   K 3.8 3.3* 3.5    106 102   CO2 22* 23 25   * 100 81   BUN 10 9 9   CREATININE 0.8 0.8 0.8   CALCIUM 8.9 8.6* 8.8   PROT 8.7* 7.9 7.9   ALBUMIN 3.8 3.4* 3.4*   BILITOT 0.6 3.0* 1.7*   ALKPHOS 68 67 66   AST 22 19 18   ALT 8* 7* 8*   ANIONGAP 10 8 10       Significant Imaging: I have reviewed all pertinent imaging results/findings within the past 24 hours.

## 2025-03-17 NOTE — ASSESSMENT & PLAN NOTE
Patient's blood pressure range in the last 24 hours was: BP  Min: 139/75  Max: 184/95.The patient's inpatient anti-hypertensive regimen is listed below:  Current Antihypertensives  hydroCHLOROthiazide tablet 25 mg, Daily, Oral  hydrALAZINE injection 10 mg, Every 6 hours PRN, Intravenous    Plan  - cont above

## 2025-03-17 NOTE — ASSESSMENT & PLAN NOTE
CT abdomen and pelvis shows: Large 7 cm simple cyst in the right hemiabdomen is of uncertain etiology possibly mesenteric cyst. This was not seen previously.   Consulted general surgery >> no interventions for now

## 2025-03-17 NOTE — PLAN OF CARE
The patient experienced episodes of pain and elevated BP.  Medications administered per order and tolerated well.  The patient verbalized understanding of plan of care. The patient denies questions at this time.

## 2025-03-17 NOTE — PROVATION PATIENT INSTRUCTIONS
Discharge Summary/Instructions after an Endoscopic Procedure  Patient Name: Mirlande Boogie  Patient MRN: [id number]  Patient YOB: 1980 Monday, March 17, 2025  Milind Voss DO  Dear patient,  As a result of recent federal legislation (The Federal Cures Act), you may   receive lab or pathology results from your procedure in your [facility]   account before your physician is able to contact you. Your physician or   their representative will relay the results to you with their   recommendations at their soonest availability.  Thank you,  RESTRICTIONS:  During your procedure today, you received medications for sedation.  These   medications may affect your judgment, balance and coordination.  Therefore,   for 24 hours, you have the following restrictions:   - DO NOT drive a car, operate machinery, make legal/financial decisions,   sign important papers or drink alcohol.    ACTIVITY:  Today: no heavy lifting, straining or running due to procedural   sedation/anesthesia.  The following day: return to full activity including work.  DIET:  Eat and drink normally unless instructed otherwise.     TREATMENT FOR COMMON SIDE EFFECTS:  - Mild abdominal pain, nausea, belching, bloating or excessive gas:  rest,   eat lightly and use a heating pad.  - Sore Throat: treat with throat lozenges and/or gargle with warm salt   water.  - Because air was used during the procedure, expelling large amounts of air   from your rectum or belching is normal.  - If a bowel prep was taken, you may not have a bowel movement for 1-3 days.    This is normal.  SYMPTOMS TO WATCH FOR AND REPORT TO YOUR PHYSICIAN:  1. Abdominal pain or bloating, other than gas cramps.  2. Chest pain.  3. Back pain.  4. Signs of infection such as: chills or fever occurring within 24 hours   after the procedure.  5. Rectal bleeding, which would show as bright red, maroon, or black stools.   (A tablespoon of blood from the rectum is not serious, especially  if   hemorrhoids are present.)  6. Vomiting.  7. Weakness or dizziness.  GO DIRECTLY TO THE NEAREST EMERGENCY ROOM IF YOU HAVE ANY OF THE FOLLOWING:      Difficulty breathing              Chills and/or fever over 101 F   Persistent vomiting and/or vomiting blood   Severe abdominal pain   Severe chest pain   Black, tarry stools   Bleeding- more than one tablespoon   Any other symptom or condition that you feel may need urgent attention  Your doctor recommends these additional instructions:  If any biopsies were taken, your doctors clinic will contact you in 1 to 2   weeks with any results.  - Continue present medications.   - Await pathology results.   - Advance diet as tolerated.   - [facility].   - No further bowel movements since admission. Advance diet as tolerated.  - Can continue empiric PPI at least until able to follow up in the clinic as   outpatient.  - Follow up in the clinic to discuss possible outpatient colonoscopy.  - Avoid NSAIDs  - Available if needed. Thank you.  For questions, problems or results please call your physician - Milind Voss DO at Work:  [phone].  [facility], EMERGENCY ROOM PHONE NUMBER: [phone]  IF A COMPLICATION OR EMERGENCY SITUATION ARISES AND YOU ARE UNABLE TO REACH   YOUR PHYSICIAN - GO DIRECTLY TO THE EMERGENCY ROOM.  Milind Voss DO  3/17/2025 1:55:08 PM  This report has been verified and signed electronically.  Dear patient,  As a result of recent federal legislation (The Federal Cures Act), you may   receive lab or pathology results from your procedure in your [facility]   account before your physician is able to contact you. Your physician or   their representative will relay the results to you with their   recommendations at their soonest availability.  Thank you,  PROVATION

## 2025-03-17 NOTE — TRANSFER OF CARE
"Anesthesia Transfer of Care Note    Patient: Mirlande Boogie    Procedure(s) Performed: Procedure(s) (LRB):  EGD (ESOPHAGOGASTRODUODENOSCOPY) (N/A)    Patient location: GI    Anesthesia Type: general    Transport from OR: Transported from OR on room air with adequate spontaneous ventilation    Post pain: adequate analgesia    Post assessment: no apparent anesthetic complications and tolerated procedure well    Post vital signs: stable    Level of consciousness: awake, alert and oriented    Nausea/Vomiting: no nausea/vomiting    Complications: none    Transfer of care protocol was followed    Last vitals: Visit Vitals  BP (!) 172/91 (BP Location: Left arm, Patient Position: Lying)   Pulse 80   Temp 36.9 °C (98.4 °F) (Skin)   Resp 18   Ht 5' 3" (1.6 m)   Wt 130.7 kg (288 lb 2.3 oz)   LMP 03/10/2025   SpO2 100%   Breastfeeding No   BMI 51.04 kg/m²     "

## 2025-03-17 NOTE — ASSESSMENT & PLAN NOTE
Body mass index is 51.04 kg/m². Morbid obesity complicates all aspects of disease management from diagnostic modalities to treatment. Weight loss encouraged and health benefits explained to patient.

## 2025-03-18 LAB
ALBUMIN SERPL BCP-MCNC: 3.3 G/DL (ref 3.5–5.2)
ALP SERPL-CCNC: 64 U/L (ref 40–150)
ALT SERPL W/O P-5'-P-CCNC: 7 U/L (ref 10–44)
ANION GAP SERPL CALC-SCNC: 11 MMOL/L (ref 8–16)
ANISOCYTOSIS BLD QL SMEAR: ABNORMAL
AST SERPL-CCNC: 16 U/L (ref 10–40)
BASOPHILS # BLD AUTO: 0.06 K/UL (ref 0–0.2)
BASOPHILS NFR BLD: 0.4 % (ref 0–1.9)
BILIRUB SERPL-MCNC: 1.3 MG/DL (ref 0.1–1)
BUN SERPL-MCNC: 11 MG/DL (ref 6–20)
CALCIUM SERPL-MCNC: 8.9 MG/DL (ref 8.7–10.5)
CHLORIDE SERPL-SCNC: 101 MMOL/L (ref 95–110)
CO2 SERPL-SCNC: 24 MMOL/L (ref 23–29)
CREAT SERPL-MCNC: 0.9 MG/DL (ref 0.5–1.4)
DIFFERENTIAL METHOD BLD: ABNORMAL
EOSINOPHIL # BLD AUTO: 0.2 K/UL (ref 0–0.5)
EOSINOPHIL NFR BLD: 1.4 % (ref 0–8)
ERYTHROCYTE [DISTWIDTH] IN BLOOD BY AUTOMATED COUNT: 28.7 % (ref 11.5–14.5)
EST. GFR  (NO RACE VARIABLE): >60 ML/MIN/1.73 M^2
GLUCOSE SERPL-MCNC: 79 MG/DL (ref 70–110)
HCT VFR BLD AUTO: 30.1 % (ref 37–48.5)
HGB BLD-MCNC: 8.2 G/DL (ref 12–16)
HYPOCHROMIA BLD QL SMEAR: ABNORMAL
IMM GRANULOCYTES # BLD AUTO: 0.07 K/UL (ref 0–0.04)
IMM GRANULOCYTES NFR BLD AUTO: 0.5 % (ref 0–0.5)
INR PPP: 1 (ref 0.8–1.2)
LYMPHOCYTES # BLD AUTO: 3.3 K/UL (ref 1–4.8)
LYMPHOCYTES NFR BLD: 23.6 % (ref 18–48)
MAGNESIUM SERPL-MCNC: 1.9 MG/DL (ref 1.6–2.6)
MCH RBC QN AUTO: 17.2 PG (ref 27–31)
MCHC RBC AUTO-ENTMCNC: 27.2 G/DL (ref 32–36)
MCV RBC AUTO: 63 FL (ref 82–98)
MONOCYTES # BLD AUTO: 1.1 K/UL (ref 0.3–1)
MONOCYTES NFR BLD: 7.7 % (ref 4–15)
NEUTROPHILS # BLD AUTO: 9.2 K/UL (ref 1.8–7.7)
NEUTROPHILS NFR BLD: 66.4 % (ref 38–73)
NRBC BLD-RTO: 0 /100 WBC
OVALOCYTES BLD QL SMEAR: ABNORMAL
PHOSPHATE SERPL-MCNC: 3.5 MG/DL (ref 2.7–4.5)
PLATELET # BLD AUTO: 379 K/UL (ref 150–450)
PLATELET BLD QL SMEAR: ABNORMAL
PMV BLD AUTO: ABNORMAL FL (ref 9.2–12.9)
POIKILOCYTOSIS BLD QL SMEAR: SLIGHT
POTASSIUM SERPL-SCNC: 3.7 MMOL/L (ref 3.5–5.1)
PROT SERPL-MCNC: 7.7 G/DL (ref 6–8.4)
PROTHROMBIN TIME: 11.4 SEC (ref 9–12.5)
RBC # BLD AUTO: 4.78 M/UL (ref 4–5.4)
SODIUM SERPL-SCNC: 136 MMOL/L (ref 136–145)
WBC # BLD AUTO: 13.83 K/UL (ref 3.9–12.7)

## 2025-03-18 PROCEDURE — 80053 COMPREHEN METABOLIC PANEL: CPT | Performed by: NURSE PRACTITIONER

## 2025-03-18 PROCEDURE — 85610 PROTHROMBIN TIME: CPT | Performed by: INTERNAL MEDICINE

## 2025-03-18 PROCEDURE — 25000003 PHARM REV CODE 250: Performed by: INTERNAL MEDICINE

## 2025-03-18 PROCEDURE — 87070 CULTURE OTHR SPECIMN AEROBIC: CPT | Performed by: INTERNAL MEDICINE

## 2025-03-18 PROCEDURE — 85025 COMPLETE CBC W/AUTO DIFF WBC: CPT | Performed by: NURSE PRACTITIONER

## 2025-03-18 PROCEDURE — 63600175 PHARM REV CODE 636 W HCPCS: Mod: TB,JZ | Performed by: NURSE PRACTITIONER

## 2025-03-18 PROCEDURE — 99231 SBSQ HOSP IP/OBS SF/LOW 25: CPT | Mod: ,,, | Performed by: SURGERY

## 2025-03-18 PROCEDURE — 63600175 PHARM REV CODE 636 W HCPCS: Performed by: RADIOLOGY

## 2025-03-18 PROCEDURE — 0W9G3ZZ DRAINAGE OF PERITONEAL CAVITY, PERCUTANEOUS APPROACH: ICD-10-PCS | Performed by: INTERNAL MEDICINE

## 2025-03-18 PROCEDURE — 63600175 PHARM REV CODE 636 W HCPCS

## 2025-03-18 PROCEDURE — 11000001 HC ACUTE MED/SURG PRIVATE ROOM

## 2025-03-18 PROCEDURE — 25000003 PHARM REV CODE 250: Performed by: NURSE PRACTITIONER

## 2025-03-18 PROCEDURE — 87206 SMEAR FLUORESCENT/ACID STAI: CPT | Performed by: INTERNAL MEDICINE

## 2025-03-18 PROCEDURE — 36415 COLL VENOUS BLD VENIPUNCTURE: CPT | Performed by: INTERNAL MEDICINE

## 2025-03-18 PROCEDURE — 87075 CULTR BACTERIA EXCEPT BLOOD: CPT | Performed by: INTERNAL MEDICINE

## 2025-03-18 PROCEDURE — 87102 FUNGUS ISOLATION CULTURE: CPT | Performed by: INTERNAL MEDICINE

## 2025-03-18 PROCEDURE — 84100 ASSAY OF PHOSPHORUS: CPT | Performed by: NURSE PRACTITIONER

## 2025-03-18 PROCEDURE — 87116 MYCOBACTERIA CULTURE: CPT | Performed by: INTERNAL MEDICINE

## 2025-03-18 PROCEDURE — 83735 ASSAY OF MAGNESIUM: CPT | Performed by: NURSE PRACTITIONER

## 2025-03-18 PROCEDURE — 36415 COLL VENOUS BLD VENIPUNCTURE: CPT | Performed by: NURSE PRACTITIONER

## 2025-03-18 PROCEDURE — 87205 SMEAR GRAM STAIN: CPT | Performed by: INTERNAL MEDICINE

## 2025-03-18 RX ORDER — PANTOPRAZOLE SODIUM 40 MG/1
40 TABLET, DELAYED RELEASE ORAL 2 TIMES DAILY
Status: DISCONTINUED | OUTPATIENT
Start: 2025-03-18 | End: 2025-03-19 | Stop reason: HOSPADM

## 2025-03-18 RX ORDER — OXYCODONE HYDROCHLORIDE 5 MG/1
5 TABLET ORAL EVERY 4 HOURS PRN
Qty: 12 TABLET | Refills: 0 | Status: SHIPPED | OUTPATIENT
Start: 2025-03-18 | End: 2025-03-19

## 2025-03-18 RX ORDER — FENTANYL CITRATE 50 UG/ML
INJECTION, SOLUTION INTRAMUSCULAR; INTRAVENOUS
Status: COMPLETED | OUTPATIENT
Start: 2025-03-18 | End: 2025-03-18

## 2025-03-18 RX ORDER — MIDAZOLAM HYDROCHLORIDE 2 MG/2ML
INJECTION, SOLUTION INTRAMUSCULAR; INTRAVENOUS
Status: DISPENSED
Start: 2025-03-18 | End: 2025-03-18

## 2025-03-18 RX ORDER — LANOLIN ALCOHOL/MO/W.PET/CERES
1 CREAM (GRAM) TOPICAL DAILY
Status: DISCONTINUED | OUTPATIENT
Start: 2025-03-18 | End: 2025-03-19 | Stop reason: HOSPADM

## 2025-03-18 RX ORDER — HYDROCHLOROTHIAZIDE 25 MG/1
25 TABLET ORAL DAILY
Qty: 30 TABLET | Refills: 6 | Status: SHIPPED | OUTPATIENT
Start: 2025-03-18 | End: 2025-03-19

## 2025-03-18 RX ORDER — FENTANYL CITRATE 50 UG/ML
INJECTION, SOLUTION INTRAMUSCULAR; INTRAVENOUS
Status: DISPENSED
Start: 2025-03-18 | End: 2025-03-18

## 2025-03-18 RX ORDER — LIDOCAINE HYDROCHLORIDE 10 MG/ML
INJECTION, SOLUTION INFILTRATION; PERINEURAL
Status: COMPLETED
Start: 2025-03-18 | End: 2025-03-18

## 2025-03-18 RX ORDER — MIDAZOLAM HYDROCHLORIDE 1 MG/ML
INJECTION, SOLUTION INTRAMUSCULAR; INTRAVENOUS
Status: COMPLETED | OUTPATIENT
Start: 2025-03-18 | End: 2025-03-18

## 2025-03-18 RX ORDER — PANTOPRAZOLE SODIUM 40 MG/1
40 TABLET, DELAYED RELEASE ORAL 2 TIMES DAILY
Qty: 60 TABLET | Refills: 0 | Status: SHIPPED | OUTPATIENT
Start: 2025-03-18 | End: 2025-03-19

## 2025-03-18 RX ORDER — FERROUS SULFATE 325(65) MG
325 TABLET, DELAYED RELEASE (ENTERIC COATED) ORAL DAILY
Qty: 30 TABLET | Refills: 0 | Status: SHIPPED | OUTPATIENT
Start: 2025-03-18 | End: 2025-03-19 | Stop reason: HOSPADM

## 2025-03-18 RX ADMIN — FENTANYL CITRATE 25 MCG: 50 INJECTION INTRAMUSCULAR; INTRAVENOUS at 10:03

## 2025-03-18 RX ADMIN — ALUMINUM HYDROXIDE, MAGNESIUM HYDROXIDE, AND SIMETHICONE 30 ML: 1200; 120; 1200 SUSPENSION ORAL at 04:03

## 2025-03-18 RX ADMIN — LIDOCAINE HYDROCHLORIDE 100 MG: 10 INJECTION, SOLUTION INFILTRATION; PERINEURAL at 10:03

## 2025-03-18 RX ADMIN — HYDROMORPHONE HYDROCHLORIDE 1 MG: 1 INJECTION, SOLUTION INTRAMUSCULAR; INTRAVENOUS; SUBCUTANEOUS at 08:03

## 2025-03-18 RX ADMIN — MIDAZOLAM 1 MG: 1 INJECTION INTRAMUSCULAR; INTRAVENOUS at 10:03

## 2025-03-18 RX ADMIN — POTASSIUM BICARBONATE 50 MEQ: 977.5 TABLET, EFFERVESCENT ORAL at 11:03

## 2025-03-18 RX ADMIN — HYDROCHLOROTHIAZIDE 25 MG: 25 TABLET ORAL at 11:03

## 2025-03-18 RX ADMIN — FERROUS SULFATE TAB 325 MG (65 MG ELEMENTAL FE) 1 EACH: 325 (65 FE) TAB at 11:03

## 2025-03-18 RX ADMIN — PANTOPRAZOLE SODIUM 40 MG: 40 TABLET, DELAYED RELEASE ORAL at 11:03

## 2025-03-18 RX ADMIN — HYDROMORPHONE HYDROCHLORIDE 1 MG: 1 INJECTION, SOLUTION INTRAMUSCULAR; INTRAVENOUS; SUBCUTANEOUS at 07:03

## 2025-03-18 RX ADMIN — PANTOPRAZOLE SODIUM 40 MG: 40 TABLET, DELAYED RELEASE ORAL at 08:03

## 2025-03-18 NOTE — ASSESSMENT & PLAN NOTE
Iron deficiency anemia   Likely due to menorrhagia   EGD showed gastritis, otherwise normal, Biopsy taken  Stool occult was positive in a setting of acute gastroenteritis     Recent Labs     03/16/25 2007 03/17/25  0329 03/18/25  0335   HGB 8.4* 8.4* 8.2*   HCT 30.8* 29.5* 30.1*     Plan  - Monitor serial CBC: Daily  - Transfuse PRBC if patient becomes hemodynamically unstable, symptomatic or H/H drops below 7/21.  - Patient has received 1 units of PRBCs on 3/16/25  - Patient's anemia is currently stable  - Patient has outpatient GYN follow up and planned for tubal libation and ablation   - iron supplements on discharge

## 2025-03-18 NOTE — INTERVAL H&P NOTE
The patient has been examined and the H&P has been reviewed:    I concur with the findings and no changes have occurred since H&P was written.    The procedure was discussed in detail, including risks and alternatives. The patient voices understanding and all questions were answered. The patient agrees to proceed as planned.    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

## 2025-03-18 NOTE — PROGRESS NOTES
General Surgery Progress Note    Admit Date: 3/15/2025  S/P: Procedure(s) (LRB):  EGD (ESOPHAGOGASTRODUODENOSCOPY) (N/A)    Post-operative Day: 1 Day Post-Op    Hospital Day: 4    SUBJECTIVE:   Underwent upper endoscopy yesterday.  Today she underwent aspiration of right upper abdominal fluid collection which removed 80 cc of clearish fluid. Patient has tolerated clear liquids though still with some abdominal cramping pain and diarrhea.  She does feel like it is slowly improving.    OBJECTIVE:     Vital Signs (Most Recent)  Temp:  [97.6 °F (36.4 °C)-99 °F (37.2 °C)] 98.5 °F (36.9 °C)  Pulse:  [73-97] 74  Resp:  [16-20] 18  SpO2:  [92 %-100 %] 96 %  BP: (116-164)/() 135/77    I&Os:  I/O last 3 completed shifts:  In: 970 [P.O.:570; I.V.:400]  Out: -     Physical Exam:  Gen: NAD, AAOx3  HEENT: Anicteric sclera  Pulm: unlabored, symmetrical   Abd: Soft, mild discomfort with palpation in the lower abdomen    Laboratory:  CBC:   Recent Labs   Lab 03/18/25  0335   WBC 13.83*   RBC 4.78   HGB 8.2*   HCT 30.1*      MCV 63*   MCH 17.2*   MCHC 27.2*     BMP:   Recent Labs   Lab 03/18/25  0335   GLU 79      K 3.7      CO2 24   BUN 11   CREATININE 0.9   CALCIUM 8.9     Labs within the past 24 hours have been reviewed.    ASSESSMENT/PLAN:     Patient Active Problem List    Diagnosis Date Noted    Acute gastroenteritis 03/17/2025    GI bleed 03/16/2025    Mesenteric cyst 03/16/2025    Ovarian cyst 03/16/2025    Symptomatic anemia 03/15/2025    Occult blood in stools 03/15/2025    Excess skin of abdomen 06/30/2021    HTN (hypertension) 10/12/2020    Obstructive sleep apnea 01/01/2019    Mixed hyperlipidemia 05/06/2018    Class 3 severe obesity with body mass index (BMI) of 45.0 to 49.9 in adult 05/06/2018         44 y.o. female with abdominal pain, nausea, vomiting, diarrhea, intra-abdominal fluid collection  -per report, IR aspiration of fluid appeared to be simple, studies were sent  -low suspicion that  this was a causative etiology for her abdominal symptoms  -upper endoscopy showed patchy erythema of the mucosa without obvious bleeding, biopsies were taken, follow up results; the patient is he is scope being discussed  -continue to treat symptoms symptomatically  -okay to advance diet as tolerated from surgical standpoint  -surgery to sign off, please re-consult with any other questions or new concerns

## 2025-03-18 NOTE — ANESTHESIA POSTPROCEDURE EVALUATION
Anesthesia Post Evaluation    Patient: Mirlande Boogie    Procedure(s) Performed: Procedure(s) (LRB):  EGD (ESOPHAGOGASTRODUODENOSCOPY) (N/A)    Final Anesthesia Type: general      Patient location during evaluation: PACU  Patient participation: Yes- Able to Participate  Level of consciousness: awake  Post-procedure vital signs: reviewed and stable  Pain management: adequate  Airway patency: patent    PONV status at discharge: No PONV  Anesthetic complications: no      Cardiovascular status: blood pressure returned to baseline  Respiratory status: unassisted  Hydration status: euvolemic  Follow-up not needed.              Vitals Value Taken Time   /77 03/18/25 15:23   Temp 36.9 °C (98.5 °F) 03/18/25 15:23   Pulse 74 03/18/25 15:23   Resp 18 03/18/25 15:23   SpO2 96 % 03/18/25 15:23         Event Time   Out of Recovery 03/17/2025 14:05:20         Pain/Artie Score: Pain Rating Prior to Med Admin: 7 (3/18/2025  7:07 AM)  Pain Rating Post Med Admin: 0 (3/18/2025 10:37 AM)  Artie Score: 10 (3/17/2025  1:55 PM)

## 2025-03-18 NOTE — PROGRESS NOTES
[facility] Medicine  Progress Note    Patient Name: Mirlande Boogie  MRN: [id number]  Patient Class: OP- Observation   Admission Date: 3/15/2025  Length of Stay: 0 days  Attending Physician: Jose Ramon Valladares MD  Primary Care Provider: Carrie Wild MD        Subjective     Principal Problem:Symptomatic anemia        HPI:    Mirlande oBogie is a 44 year old female with a past medical history of anemia, elevated BP readings without hypertension, obesity, hypothyroidism not on medication, who presented to the ED with a complaint of lower abdominal cramping, nausea, vomiting and diarrhea since Wednesday. She states that she ate a ham and cheese sandwich from [employer] that tasted off, and the following day began having abdominal cramping, nausea, vomiting and diarrhea. She reports about 4 episodes of diarrhea today, and saw blood in the specimen that she obtained when she arrived to the ED. She does report taking ibuprofen frequently, and denies hematemesis. She states that she feels dehydrated and believes she has food poisoning. She denies fever but reports chills. She states that she just ended her menstrual cycle. She denies other complaint.    ED work up included a CBC which was significant for an elevated WBC of 15.6, H/H 6.9/27.2. She has a history of anemia and hasn't been taking her oral supplements. She also ended her menstrual cycle a few days ago. 2 units of PRBC were ordered by the ED to be transfused. CMP significant for CO2 22, glucose 113. Beta hydroxybutyrate 0.2. Lipase 31, Lactate dehydrogenase 199, Ferritin 3. Urinalysis negative for evidence of infection. CT abdomen and pelvis revealed: No acute abnormality in the abdomen. Large 7 cm simple cyst in the right hemiabdomen is of uncertain etiology possibly mesenteric cyst.  This was not seen previously. Left ovarian 2 cm cyst. Uterine myomas. Blood cultures were obtained. She was initiated on Zosyn in the ED. Stool  studies were collected and sent to lab. Occult blood was seen on labs, as well as many neutrophils on the stool WBC. Rotavirus negative. GI consulted, General surgery consulted. Hospital medicine consulted for admission and further management.     Overview/Hospital Course:  Patient is a 44 year old female with history of anemia, hypothyroidism and obesity admitted after abdominal cramps, diarrhea with blood and nausea and vomiting likely due to food poisoning. Patient initially received Zosyn, but this was stopped on D1. Her stools was positive for occult blood and many WBC. Patient has baseline Hb around 8, Hb was 6.9 on admission. Patient reports menorrhagia on her last period, also had a recent vaginal biopsy and planned for tubal ligation and ablation outpatient. Patient was transfused and Hb is stable. GI is consulted and planned for EGD. Patient was also found to have 7 cm incidental cyst and general surgery was consulted. She was treated symptomatically for nausea, vomiting and diarrhea.  EGD shows gastritis but otherwise unremarkable. Patient was started on clear liquid diet. Patient underwent IR drainage of the abdominal cyst 3/18. Fluid sent for culture and gram stain.     Interval History: Patient is NPO this morning. States that she still has intermittent pain. She tolerated clear liquids last night and this morning. Hb is stable.     Review of Systems  Objective:     Vital Signs (Most Recent):  Temp: 97.6 °F (36.4 °C) (03/18/25 1157)  Pulse: 75 (03/18/25 1157)  Resp: 18 (03/18/25 1157)  BP: 136/74 (03/18/25 1157)  SpO2: 96 % (03/18/25 1157) Vital Signs (24h Range):  Temp:  [97.6 °F (36.4 °C)-99 °F (37.2 °C)] 97.6 °F (36.4 °C)  Pulse:  [] 75  Resp:  [16-20] 18  SpO2:  [92 %-100 %] 96 %  BP: (116-187)/() 136/74     Weight: 130.7 kg (288 lb 2.3 oz)  Body mass index is 51.04 kg/m².    Intake/Output Summary (Last 24 hours) at 3/18/2025 1242  Last data filed at 3/18/2025 3323  Gross per 24 hour    Intake 970 ml   Output --   Net 970 ml         Physical Exam  Vitals and nursing note reviewed.   Constitutional:       General: She is not in acute distress.     Appearance: She is obese. She is not toxic-appearing.   HENT:      Head: Atraumatic.      Mouth/Throat:      Mouth: Mucous membranes are moist.      Pharynx: Oropharynx is clear.   Eyes:      General: No scleral icterus.     Conjunctiva/sclera: Conjunctivae normal.      Pupils: Pupils are equal, round, and reactive to light.   Cardiovascular:      Rate and Rhythm: Normal rate and regular rhythm.      Heart sounds: No murmur heard.  Pulmonary:      Effort: No respiratory distress.      Breath sounds: No wheezing, rhonchi or rales.   Abdominal:      General: Abdomen is flat. Bowel sounds are normal.      Palpations: Abdomen is soft.   Musculoskeletal:         General: No swelling or deformity.      Cervical back: No rigidity or tenderness.   Skin:     Coloration: Skin is not jaundiced or pale.      Findings: No bruising, erythema or rash.   Neurological:      General: No focal deficit present.      Mental Status: She is alert and oriented to person, place, and time.      Cranial Nerves: No cranial nerve deficit.      Sensory: No sensory deficit.      Motor: No weakness.   Psychiatric:         Mood and Affect: Mood normal.         Behavior: Behavior normal.       Significant Labs: All pertinent labs within the past 24 hours have been reviewed.  CBC:   Recent Labs   Lab 03/16/25 2007 03/17/25 0329 03/18/25 0335   WBC 15.27* 16.55* 13.83*   HGB 8.4* 8.4* 8.2*   HCT 30.8* 29.5* 30.1*    367 379     CMP:   Recent Labs   Lab 03/17/25 0329 03/18/25  0335    136   K 3.5 3.7    101   CO2 25 24   GLU 81 79   BUN 9 11   CREATININE 0.8 0.9   CALCIUM 8.8 8.9   PROT 7.9 7.7   ALBUMIN 3.4* 3.3*   BILITOT 1.7* 1.3*   ALKPHOS 66 64   AST 18 16   ALT 8* 7*   ANIONGAP 10 11       Significant Imaging: I have reviewed all pertinent imaging  results/findings within the past 24 hours.      Assessment & Plan  Symptomatic anemia  Iron deficiency anemia   Likely due to menorrhagia   EGD showed gastritis, otherwise normal, Biopsy taken  Stool occult was positive in a setting of acute gastroenteritis     Recent Labs     03/16/25 2007 03/17/25  0329 03/18/25  0335   HGB 8.4* 8.4* 8.2*   HCT 30.8* 29.5* 30.1*     Plan  - Monitor serial CBC: Daily  - Transfuse PRBC if patient becomes hemodynamically unstable, symptomatic or H/H drops below 7/21.  - Patient has received 1 units of PRBCs on 3/16/25  - Patient's anemia is currently stable  - Patient has outpatient GYN follow up and planned for tubal libation and ablation   - iron supplements on discharge   Class 3 severe obesity with body mass index (BMI) of 45.0 to 49.9 in adult  Body mass index is 51.04 kg/m². Morbid obesity complicates all aspects of disease management from diagnostic modalities to treatment. Weight loss encouraged and health benefits explained to patient.       HTN (hypertension)  Patient's blood pressure range in the last 24 hours was: BP  Min: 116/66  Max: 187/85.The patient's inpatient anti-hypertensive regimen is listed below:  Current Antihypertensives  hydroCHLOROthiazide tablet 25 mg, Daily, Oral  hydrALAZINE injection 10 mg, Every 6 hours PRN, Intravenous  hydrochlorothiazide (HYDRODIURIL) tablet, Daily, Oral    Plan  - cont above   GI bleed  Some blood mixed stools likely due to gastroenteritis   Cont Protonix BID, changed to PO  EGD 3/17 showed gastritis   Outpatient colonoscopy   Mesenteric cyst  CT abdomen and pelvis shows: Large 7 cm simple cyst in the right hemiabdomen is of uncertain etiology possibly mesenteric cyst. This was not seen previously.   Consulted general surgery >> no interventions for now   S/p IR drainage on 3/18, follow up fluid gram stain and culture     Ovarian cyst  Ovarian cyst and uterine myomas seen on CT.   Patient already had outpatient pelvic US, she  defer to follow up outpatient     Acute gastroenteritis  Stool occult positive, stool WBC positive    C diff negative   Cont symptomatic Rx       VTE Risk Mitigation (From admission, onward)           Ordered     IP VTE HIGH RISK PATIENT  Once         03/16/25 0028     Place sequential compression device  Until discontinued         03/16/25 0028     Reason for No Pharmacological VTE Prophylaxis  Once        Question:  Reasons:  Answer:  Active Bleeding    03/16/25 0011     Place sequential compression device  Until discontinued         03/16/25 0011                    Discharge Planning   CUCO: 3/19/2025     Code Status: Full Code   Medical Readiness for Discharge Date: 3/18/2025  Discharge Plan A: Home with family                        Jose Ramon Valladares MD  Department of Hospital Medicine   [facility]/Surg

## 2025-03-18 NOTE — ASSESSMENT & PLAN NOTE
Patient's blood pressure range in the last 24 hours was: BP  Min: 116/66  Max: 187/85.The patient's inpatient anti-hypertensive regimen is listed below:  Current Antihypertensives  hydroCHLOROthiazide tablet 25 mg, Daily, Oral  hydrALAZINE injection 10 mg, Every 6 hours PRN, Intravenous  hydrochlorothiazide (HYDRODIURIL) tablet, Daily, Oral    Plan  - cont above

## 2025-03-18 NOTE — ASSESSMENT & PLAN NOTE
Some blood mixed stools likely due to gastroenteritis   Cont Protonix BID, changed to PO  EGD 3/17 showed gastritis   Outpatient colonoscopy

## 2025-03-18 NOTE — ASSESSMENT & PLAN NOTE
CT abdomen and pelvis shows: Large 7 cm simple cyst in the right hemiabdomen is of uncertain etiology possibly mesenteric cyst. This was not seen previously.   Consulted general surgery >> no interventions for now   S/p IR drainage on 3/18, follow up fluid gram stain and culture

## 2025-03-18 NOTE — PLAN OF CARE
Problem: Adult Inpatient Plan of Care  Goal: Plan of Care Review  Outcome: Progressing  Goal: Patient-Specific Goal (Individualized)  Outcome: Progressing  Goal: Absence of Hospital-Acquired Illness or Injury  Outcome: Progressing     Problem: Gastrointestinal Bleeding  Goal: Optimal Coping with Acute Illness  Outcome: Progressing

## 2025-03-18 NOTE — SUBJECTIVE & OBJECTIVE
Interval History: Patient is NPO this morning. States that she still has intermittent pain. She tolerated clear liquids last night and this morning. Hb is stable.     Review of Systems  Objective:     Vital Signs (Most Recent):  Temp: 97.6 °F (36.4 °C) (03/18/25 1157)  Pulse: 75 (03/18/25 1157)  Resp: 18 (03/18/25 1157)  BP: 136/74 (03/18/25 1157)  SpO2: 96 % (03/18/25 1157) Vital Signs (24h Range):  Temp:  [97.6 °F (36.4 °C)-99 °F (37.2 °C)] 97.6 °F (36.4 °C)  Pulse:  [] 75  Resp:  [16-20] 18  SpO2:  [92 %-100 %] 96 %  BP: (116-187)/() 136/74     Weight: 130.7 kg (288 lb 2.3 oz)  Body mass index is 51.04 kg/m².    Intake/Output Summary (Last 24 hours) at 3/18/2025 1242  Last data filed at 3/18/2025 0423  Gross per 24 hour   Intake 970 ml   Output --   Net 970 ml         Physical Exam  Vitals and nursing note reviewed.   Constitutional:       General: She is not in acute distress.     Appearance: She is obese. She is not toxic-appearing.   HENT:      Head: Atraumatic.      Mouth/Throat:      Mouth: Mucous membranes are moist.      Pharynx: Oropharynx is clear.   Eyes:      General: No scleral icterus.     Conjunctiva/sclera: Conjunctivae normal.      Pupils: Pupils are equal, round, and reactive to light.   Cardiovascular:      Rate and Rhythm: Normal rate and regular rhythm.      Heart sounds: No murmur heard.  Pulmonary:      Effort: No respiratory distress.      Breath sounds: No wheezing, rhonchi or rales.   Abdominal:      General: Abdomen is flat. Bowel sounds are normal.      Palpations: Abdomen is soft.   Musculoskeletal:         General: No swelling or deformity.      Cervical back: No rigidity or tenderness.   Skin:     Coloration: Skin is not jaundiced or pale.      Findings: No bruising, erythema or rash.   Neurological:      General: No focal deficit present.      Mental Status: She is alert and oriented to person, place, and time.      Cranial Nerves: No cranial nerve deficit.      Sensory:  No sensory deficit.      Motor: No weakness.   Psychiatric:         Mood and Affect: Mood normal.         Behavior: Behavior normal.       Significant Labs: All pertinent labs within the past 24 hours have been reviewed.  CBC:   Recent Labs   Lab 03/16/25 2007 03/17/25 0329 03/18/25 0335   WBC 15.27* 16.55* 13.83*   HGB 8.4* 8.4* 8.2*   HCT 30.8* 29.5* 30.1*    367 379     CMP:   Recent Labs   Lab 03/17/25 0329 03/18/25 0335    136   K 3.5 3.7    101   CO2 25 24   GLU 81 79   BUN 9 11   CREATININE 0.8 0.9   CALCIUM 8.8 8.9   PROT 7.9 7.7   ALBUMIN 3.4* 3.3*   BILITOT 1.7* 1.3*   ALKPHOS 66 64   AST 18 16   ALT 8* 7*   ANIONGAP 10 11       Significant Imaging: I have reviewed all pertinent imaging results/findings within the past 24 hours.

## 2025-03-18 NOTE — PLAN OF CARE
Problem: Adult Inpatient Plan of Care  Goal: Plan of Care Review  Outcome: Progressing     Problem: Adult Inpatient Plan of Care  Goal: Absence of Hospital-Acquired Illness or Injury  Outcome: Progressing     Problem: Adult Inpatient Plan of Care  Goal: Optimal Comfort and Wellbeing  Outcome: Progressing     Problem: Bariatric Environmental Safety  Goal: Safety Maintained with Care  Outcome: Progressing     Problem: Gastrointestinal Bleeding  Goal: Optimal Coping with Acute Illness  Outcome: Progressing     Problem: Infection  Goal: Absence of Infection Signs and Symptoms  Outcome: Progressing

## 2025-03-18 NOTE — NURSING
Patient reports clear vaginal discharge that presented about 1 week ago. Patient thought it was related to ovulation, but has lasted past ovulation cycle. Hospitalist notified.

## 2025-03-18 NOTE — NURSING
Pt arrived via stretcher from room 213 for a cyst/abscess drainage.   Pt AAOx4.- procedure explained and consent obtained by VENKAT Cisneros, for Dr Lopez.  Time out performed.   Pt received Fentanyl 50 mcg and Versed 2 mg IV under the direct supervision of qualified MD.   Direct patient monitoring provided by RN stable for duration of procedure.     80cc of clear material drained.   Specimen submitted to lab per  orders.   Report given to MOLLY Marshall.  Pt transported back to room via bed in stable condition.

## 2025-03-19 VITALS
BODY MASS INDEX: 51.05 KG/M2 | TEMPERATURE: 99 F | OXYGEN SATURATION: 95 % | SYSTOLIC BLOOD PRESSURE: 160 MMHG | DIASTOLIC BLOOD PRESSURE: 83 MMHG | HEART RATE: 78 BPM | WEIGHT: 288.13 LBS | RESPIRATION RATE: 18 BRPM | HEIGHT: 63 IN

## 2025-03-19 LAB
ANISOCYTOSIS BLD QL SMEAR: ABNORMAL
BASOPHILS # BLD AUTO: 0.08 K/UL (ref 0–0.2)
BASOPHILS NFR BLD: 0.8 % (ref 0–1.9)
DIFFERENTIAL METHOD BLD: ABNORMAL
EOSINOPHIL # BLD AUTO: 0.3 K/UL (ref 0–0.5)
EOSINOPHIL NFR BLD: 2.4 % (ref 0–8)
ERYTHROCYTE [DISTWIDTH] IN BLOOD BY AUTOMATED COUNT: 29.1 % (ref 11.5–14.5)
GRAM STN SPEC: NORMAL
GRAM STN SPEC: NORMAL
HCT VFR BLD AUTO: 30.6 % (ref 37–48.5)
HGB BLD-MCNC: 8.2 G/DL (ref 12–16)
HYPOCHROMIA BLD QL SMEAR: ABNORMAL
IMM GRANULOCYTES # BLD AUTO: 0.06 K/UL (ref 0–0.04)
IMM GRANULOCYTES NFR BLD AUTO: 0.6 % (ref 0–0.5)
LYMPHOCYTES # BLD AUTO: 3 K/UL (ref 1–4.8)
LYMPHOCYTES NFR BLD: 29.1 % (ref 18–48)
MAGNESIUM SERPL-MCNC: 1.9 MG/DL (ref 1.6–2.6)
MCH RBC QN AUTO: 17.3 PG (ref 27–31)
MCHC RBC AUTO-ENTMCNC: 26.8 G/DL (ref 32–36)
MCV RBC AUTO: 64 FL (ref 82–98)
MONOCYTES # BLD AUTO: 0.8 K/UL (ref 0.3–1)
MONOCYTES NFR BLD: 8.1 % (ref 4–15)
NEUTROPHILS # BLD AUTO: 6.1 K/UL (ref 1.8–7.7)
NEUTROPHILS NFR BLD: 59 % (ref 38–73)
NRBC BLD-RTO: 0 /100 WBC
OVALOCYTES BLD QL SMEAR: ABNORMAL
PHOSPHATE SERPL-MCNC: 4.2 MG/DL (ref 2.7–4.5)
PLATELET # BLD AUTO: 340 K/UL (ref 150–450)
PLATELET BLD QL SMEAR: ABNORMAL
PMV BLD AUTO: ABNORMAL FL (ref 9.2–12.9)
POIKILOCYTOSIS BLD QL SMEAR: SLIGHT
POLYCHROMASIA BLD QL SMEAR: ABNORMAL
RBC # BLD AUTO: 4.75 M/UL (ref 4–5.4)
TARGETS BLD QL SMEAR: ABNORMAL
WBC # BLD AUTO: 10.26 K/UL (ref 3.9–12.7)

## 2025-03-19 PROCEDURE — 84100 ASSAY OF PHOSPHORUS: CPT | Performed by: NURSE PRACTITIONER

## 2025-03-19 PROCEDURE — 25000003 PHARM REV CODE 250: Performed by: NURSE PRACTITIONER

## 2025-03-19 PROCEDURE — 36415 COLL VENOUS BLD VENIPUNCTURE: CPT | Performed by: NURSE PRACTITIONER

## 2025-03-19 PROCEDURE — 63700000 PHARM REV CODE 250 ALT 637 W/O HCPCS: Performed by: INTERNAL MEDICINE

## 2025-03-19 PROCEDURE — 85025 COMPLETE CBC W/AUTO DIFF WBC: CPT | Performed by: NURSE PRACTITIONER

## 2025-03-19 PROCEDURE — 83735 ASSAY OF MAGNESIUM: CPT | Performed by: NURSE PRACTITIONER

## 2025-03-19 PROCEDURE — 25000003 PHARM REV CODE 250: Performed by: INTERNAL MEDICINE

## 2025-03-19 RX ORDER — OXYCODONE AND ACETAMINOPHEN 5; 325 MG/1; MG/1
1 TABLET ORAL EVERY 4 HOURS PRN
Refills: 0 | Status: DISCONTINUED | OUTPATIENT
Start: 2025-03-19 | End: 2025-03-19 | Stop reason: HOSPADM

## 2025-03-19 RX ORDER — HYDROCHLOROTHIAZIDE 25 MG/1
25 TABLET ORAL DAILY
Qty: 30 TABLET | Refills: 6 | Status: SHIPPED | OUTPATIENT
Start: 2025-03-19

## 2025-03-19 RX ORDER — OXYCODONE HYDROCHLORIDE 5 MG/1
5 TABLET ORAL EVERY 4 HOURS PRN
Qty: 12 TABLET | Refills: 0 | Status: SHIPPED | OUTPATIENT
Start: 2025-03-19

## 2025-03-19 RX ORDER — PANTOPRAZOLE SODIUM 40 MG/1
40 TABLET, DELAYED RELEASE ORAL 2 TIMES DAILY
Qty: 60 TABLET | Refills: 0 | Status: SHIPPED | OUTPATIENT
Start: 2025-03-19 | End: 2025-04-18

## 2025-03-19 RX ADMIN — FLUCONAZOLE 150 MG: 50 TABLET ORAL at 11:03

## 2025-03-19 RX ADMIN — HYDROCHLOROTHIAZIDE 25 MG: 25 TABLET ORAL at 08:03

## 2025-03-19 RX ADMIN — FERROUS SULFATE TAB 325 MG (65 MG ELEMENTAL FE) 1 EACH: 325 (65 FE) TAB at 08:03

## 2025-03-19 RX ADMIN — ALUMINUM HYDROXIDE, MAGNESIUM HYDROXIDE, AND SIMETHICONE 30 ML: 1200; 120; 1200 SUSPENSION ORAL at 08:03

## 2025-03-19 RX ADMIN — PANTOPRAZOLE SODIUM 40 MG: 40 TABLET, DELAYED RELEASE ORAL at 08:03

## 2025-03-19 NOTE — PLAN OF CARE
Problem: Adult Inpatient Plan of Care  Goal: Plan of Care Review  3/19/2025 1343 by Isha Guaman, RN  Outcome: Progressing  3/19/2025 1343 by Isha Guaman, RN  Outcome: Progressing     Problem: Adult Inpatient Plan of Care  Goal: Readiness for Transition of Care  3/19/2025 1343 by Isha Guaman, RN  Outcome: Progressing  3/19/2025 1343 by Isha Gauman, RN  Outcome: Progressing

## 2025-03-19 NOTE — ASSESSMENT & PLAN NOTE
Patient's blood pressure range in the last 24 hours was: BP  Min: 116/66  Max: 150/88.The patient's inpatient anti-hypertensive regimen is listed below:  Patient takes HCTZ PRN at home, this was changed to daily

## 2025-03-19 NOTE — ASSESSMENT & PLAN NOTE
Iron deficiency anemia   Likely due to menorrhagia   EGD showed gastritis, otherwise normal, Biopsy taken  Stool occult was positive in a setting of acute gastroenteritis     Recent Labs     03/17/25  0329 03/18/25  0335 03/19/25  0354   HGB 8.4* 8.2* 8.2*   HCT 29.5* 30.1* 30.6*     - Patient has received 1 units of PRBCs on 3/16/25  - Patient's anemia is currently stable  - Patient has outpatient GYN follow up and planned for tubal libation and ablation   - iron supplements on discharge

## 2025-03-19 NOTE — NURSING
Patient discharged home with discharge paperwork. Patient wheeled to personal vehicle driven by sister.

## 2025-03-19 NOTE — PLAN OF CARE
Problem: Adult Inpatient Plan of Care  Goal: Plan of Care Review  Outcome: Progressing  Goal: Patient-Specific Goal (Individualized)  Outcome: Progressing     Problem: Sepsis/Septic Shock  Goal: Absence of Bleeding  Outcome: Progressing  Goal: Absence of Infection Signs and Symptoms  Outcome: Progressing

## 2025-03-19 NOTE — ASSESSMENT & PLAN NOTE
CT abdomen and pelvis shows: Large 7 cm simple cyst in the right hemiabdomen is of uncertain etiology possibly mesenteric cyst. This was not seen previously.   Consulted general surgery >> no interventions for now   S/p IR drainage on 3/18, fluid analysis showed no WBC, no organisms

## 2025-03-19 NOTE — DISCHARGE SUMMARY
[facility] Medicine  Discharge Summary      Patient Name: Mirlande Boogie  MRN: [id number]  CLIFF: [id number]  Patient Class: IP- Inpatient  Admission Date: 3/15/2025  Hospital Length of Stay: 1 days  Discharge Date and Time:  03/19/2025 8:36 AM  Attending Physician: Jose Ramon Valladares MD   Discharging Provider: Jose Ramon Valladares MD  Primary Care Provider: Carrie Wild MD    Primary Care Team: Networked reference to record PCT     HPI:     Mirladne Boogie is a 44 year old female with a past medical history of anemia, elevated BP readings without hypertension, obesity, hypothyroidism not on medication, who presented to the ED with a complaint of lower abdominal cramping, nausea, vomiting and diarrhea since Wednesday. She states that she ate a ham and cheese sandwich from [employer] that tasted off, and the following day began having abdominal cramping, nausea, vomiting and diarrhea. She reports about 4 episodes of diarrhea today, and saw blood in the specimen that she obtained when she arrived to the ED. She does report taking ibuprofen frequently, and denies hematemesis. She states that she feels dehydrated and believes she has food poisoning. She denies fever but reports chills. She states that she just ended her menstrual cycle. She denies other complaint.    ED work up included a CBC which was significant for an elevated WBC of 15.6, H/H 6.9/27.2. She has a history of anemia and hasn't been taking her oral supplements. She also ended her menstrual cycle a few days ago. 2 units of PRBC were ordered by the ED to be transfused. CMP significant for CO2 22, glucose 113. Beta hydroxybutyrate 0.2. Lipase 31, Lactate dehydrogenase 199, Ferritin 3. Urinalysis negative for evidence of infection. CT abdomen and pelvis revealed: No acute abnormality in the abdomen. Large 7 cm simple cyst in the right hemiabdomen is of uncertain etiology possibly mesenteric cyst.  This was not seen  previously. Left ovarian 2 cm cyst. Uterine myomas. Blood cultures were obtained. She was initiated on Zosyn in the ED. Stool studies were collected and sent to lab. Occult blood was seen on labs, as well as many neutrophils on the stool WBC. Rotavirus negative. GI consulted, General surgery consulted. Hospital medicine consulted for admission and further management.     Procedure(s) (LRB):  EGD (ESOPHAGOGASTRODUODENOSCOPY) (N/A)      Hospital Course:   Patient is a 44 year old female with history of anemia, hypothyroidism and obesity admitted after abdominal cramps, diarrhea with blood and nausea and vomiting likely due to food poisoning. Patient initially received Zosyn, but this was stopped on D1. Her stools was positive for occult blood and many WBC. Patient has baseline Hb around 8, Hb was 6.9 on admission. Patient reports menorrhagia on her last period, also had a recent vaginal biopsy and planned for tubal ligation and ablation outpatient. Patient was transfused and Hb is stable. GI is consulted and planned for EGD. Patient was also found to have 7 cm incidental cyst and general surgery was consulted. She was treated symptomatically for nausea, vomiting and diarrhea.  EGD shows gastritis but otherwise unremarkable. Patient was started on clear liquid diet. Patient underwent IR drainage of the abdominal cyst 3/18. Fluid sent for culture and gram stain which shows no WBC or no organisms. Surgery signed off. Diet advanced to low fiber and patient tolerating well.     Patient seen and examined on the day of discharge. Abdominal pain, nausea, vomiting and diarrhea improved. Patient is appropriate for discharge today.      Goals of Care Treatment Preferences:  Code Status: Full Code      SDOH Screening:  The patient was screened for food insecurity, housing instability, transportation needs, utility difficulties, and interpersonal safety. The social determinant(s) of health identified as a concern this admission  are:  Housing instability  Food insecurity    Will discuss with case management and/or community health workers.    Social Drivers of Health with Concerns     Food Insecurity: Food Insecurity Present (3/18/2025)   Housing Stability: High Risk (3/18/2025)        Consults:   Consults (From admission, onward)          Status Ordering Provider     Inpatient consult to General Surgery  Once        Provider:  Oseas Long Jr., MD    Completed TILA NUNEZ     Inpatient consult to Gastroenterology  Once        Provider:  Milind Voss DO    Completed TILA NUNEZ            Assessment & Plan  Symptomatic anemia  Iron deficiency anemia   Likely due to menorrhagia   EGD showed gastritis, otherwise normal, Biopsy taken  Stool occult was positive in a setting of acute gastroenteritis     Recent Labs     03/17/25  0329 03/18/25  0335 03/19/25  0354   HGB 8.4* 8.2* 8.2*   HCT 29.5* 30.1* 30.6*     - Patient has received 1 units of PRBCs on 3/16/25  - Patient's anemia is currently stable  - Patient has outpatient GYN follow up and planned for tubal libation and ablation   - iron supplements on discharge   Class 3 severe obesity with body mass index (BMI) of 45.0 to 49.9 in adult  Body mass index is 51.04 kg/m². Morbid obesity complicates all aspects of disease management from diagnostic modalities to treatment. Weight loss encouraged and health benefits explained to patient.       HTN (hypertension)  Patient's blood pressure range in the last 24 hours was: BP  Min: 116/66  Max: 150/88.The patient's inpatient anti-hypertensive regimen is listed below:  Patient takes HCTZ PRN at home, this was changed to daily   GI bleed  Some blood mixed stools likely due to gastroenteritis   Cont Protonix BID, changed to PO  EGD 3/17 showed gastritis   Outpatient colonoscopy   Mesenteric cyst  CT abdomen and pelvis shows: Large 7 cm simple cyst in the right hemiabdomen is of uncertain etiology possibly mesenteric cyst. This  was not seen previously.   Consulted general surgery >> no interventions for now   S/p IR drainage on 3/18, fluid analysis showed no WBC, no organisms     Ovarian cyst  Ovarian cyst and uterine myomas seen on CT.   Patient already had outpatient pelvic US, she defer to follow up outpatient     Acute gastroenteritis  Stool occult positive, stool WBC positive    C diff negative   Cont symptomatic Rx       Final Active Diagnoses:    Diagnosis Date Noted POA    PRINCIPAL PROBLEM:  Symptomatic anemia [D64.9] 03/15/2025 Yes    Acute gastroenteritis [K52.9] 03/17/2025 Yes    GI bleed [K92.2] 03/16/2025 Yes    Mesenteric cyst [K66.8] 03/16/2025 Yes    Ovarian cyst [N83.209] 03/16/2025 Yes    HTN (hypertension) [I10] 10/12/2020 Yes    Class 3 severe obesity with body mass index (BMI) of 45.0 to 49.9 in adult [E66.813, E66.01, Z68.42] 05/06/2018 Not Applicable      Problems Resolved During this Admission:    Diagnosis Date Noted Date Resolved POA    Sepsis [A41.9] 03/16/2025 03/17/2025 Yes       Discharged Condition: good    Disposition: Home or Self Care    Follow Up:   Follow-up Information       Carrie Wild MD Follow up.    Specialty: Family Medicine  Contact information:  [address] [facility] [address] [phone]                           Patient Instructions:   No discharge procedures on file.    Significant Diagnostic Studies: Labs: CMP   Recent Labs   Lab 03/18/25  0335      K 3.7      CO2 24   GLU 79   BUN 11   CREATININE 0.9   CALCIUM 8.9   PROT 7.7   ALBUMIN 3.3*   BILITOT 1.3*   ALKPHOS 64   AST 16   ALT 7*   ANIONGAP 11    and CBC   Recent Labs   Lab 03/18/25  0335 03/19/25  0354   WBC 13.83* 10.26   HGB 8.2* 8.2*   HCT 30.1* 30.6*    340       Pending Diagnostic Studies:       None           Medications:  Reconciled Home Medications:      Medication List        START taking these medications      ferrous sulfate 325 (65 FE) MG EC tablet  Take 1 tablet (325 mg total) by mouth  once daily.     oxyCODONE 5 MG immediate release tablet  Commonly known as: ROXICODONE  Take 1 tablet (5 mg total) by mouth every 4 (four) hours as needed for Pain.     pantoprazole 40 MG tablet  Commonly known as: PROTONIX  Take 1 tablet (40 mg total) by mouth 2 (two) times daily.            CHANGE how you take these medications      hydroCHLOROthiazide 25 MG tablet  Commonly known as: HYDRODIURIL  Take 1 tablet (25 mg total) by mouth once daily.  What changed:   how much to take  how to take this  when to take this  Another medication with the same name was removed. Continue taking this medication, and follow the directions you see here.            CONTINUE taking these medications      butalbital-acetaminophen-caff -40 mg Cap  take 1 tablet by mouth every four to six hours as needed for headache; MAX OF 4 TABLETS PER 24 HOURS.     cholecalciferol (vitamin D3) 1,250 mcg (50,000 unit) capsule  TAKE 1 CAPSULE BY MOUTH ONCE WEEKLY.     fluconazole 150 MG Tab  Commonly known as: DIFLUCAN  Take 1 tablet by mouth once as needed for antibiotic induced yeast infection.     miSOPROStoL 200 MCG Tab  Commonly known as: CYTOTEC  Take 1 tablet (200 mcg total) by mouth 1 hour before procedure.     * MOUNJARO 2.5 mg/0.5 mL Pnij  Generic drug: tirzepatide  Inject 2.5 mg into the skin once weekly for 4 weeks.     * MOUNJARO 5 mg/0.5 mL Pnij  Generic drug: tirzepatide  INJECT 5MG UNDER THE SKIN ONCE A WEEK FOR 4 WEEKS     * MOUNJARO 7.5 mg/0.5 mL Pnij  Generic drug: tirzepatide  Inject 7.5MG into the skin once a week     * MOUNJARO 10 mg/0.5 mL Pnij  Generic drug: tirzepatide  Inject 10mg into the skin once a week for 4 weeks     * MOUNJARO 12.5 mg/0.5 mL Pnij  Generic drug: tirzepatide  Inject 12.5 mg into the skin every 7 days.     * MOUNJARO 12.5 mg/0.5 mL Pnij  Generic drug: tirzepatide  inject 12.5mg sc once a week for 4 weeks     * MOUNJARO 15 mg/0.5 mL Pnij  Generic drug: tirzepatide  inject 15mg under the skin once a  "week for 4 weeks     * MOUNJARO 15 mg/0.5 mL Pnij  Generic drug: tirzepatide  Inject 15 mg into the skin every 7 days.     * MOUNJARO 15 mg/0.5 mL Pnij  Generic drug: tirzepatide  Inject 15 mg into the skin every 7 days.     * MOUNJARO 15 mg/0.5 mL Pnij  Generic drug: tirzepatide  Inject 15mg subcutaneously into the skin once a week.     * MOUNJARO 15 mg/0.5 mL Pnij  Generic drug: tirzepatide  Inject 15 mg into the skin every 7 days.     * MOUNJARO 15 mg/0.5 mL Pnij  Generic drug: tirzepatide  inject 15mg into the skin once weekly     OZEMPIC 0.25 mg or 0.5 mg(2 mg/1.5 mL) pen injector  Generic drug: semaglutide  Inject 0.5 mg into the skin once a week.     pen needle, diabetic 31 gauge x 1/4" Ndle  Commonly known as: PEN NEEDLE  1 each by Misc.(Non-Drug; Combo Route) route once a week. Use with ozemoic           * This list has 12 medication(s) that are the same as other medications prescribed for you. Read the directions carefully, and ask your doctor or other care provider to review them with you.                  Indwelling Lines/Drains at time of discharge:   Lines/Drains/Airways       None                   Time spent on the discharge of patient: 40 minutes         Jose Ramon Valladares MD  Department of Hospital Medicine  [facility]/Surg  "

## 2025-03-19 NOTE — PLAN OF CARE
Pt clear for DC from case management standpoint. Discharging to home.    Scheduled PCP apt using smart scheduling suggestion. Apt added to AVS.       03/19/25 1103   Final Note   Assessment Type Final Discharge Note   Anticipated Discharge Disposition Home   Hospital Resources/Appts/Education Provided Appointments scheduled and added to AVS   Post-Acute Status   Discharge Delays None known at this time

## 2025-03-19 NOTE — PLAN OF CARE
Problem: Adult Inpatient Plan of Care  Goal: Plan of Care Review  3/19/2025 1554 by Isha Guaman RN  Outcome: Met  3/19/2025 1343 by Isha Guaman RN  Outcome: Progressing  3/19/2025 1343 by Isha Guaman RN  Outcome: Progressing  Goal: Patient-Specific Goal (Individualized)  3/19/2025 1554 by Isha Guaman RN  Outcome: Met  3/19/2025 1343 by Isha Guaman RN  Outcome: Progressing  Goal: Absence of Hospital-Acquired Illness or Injury  3/19/2025 1554 by Isha Guaman RN  Outcome: Met  3/19/2025 1343 by Isha Guaman RN  Outcome: Progressing  Goal: Optimal Comfort and Wellbeing  3/19/2025 1554 by Isha Guaman RN  Outcome: Met  3/19/2025 1343 by Isha Guaman RN  Outcome: Progressing  Goal: Readiness for Transition of Care  3/19/2025 1554 by Isha Guaman RN  Outcome: Met  3/19/2025 1343 by Isha Guaman RN  Outcome: Progressing  3/19/2025 1343 by Isha Guaman RN  Outcome: Progressing     Problem: Bariatric Environmental Safety  Goal: Safety Maintained with Care  3/19/2025 1554 by Isha Guaman RN  Outcome: Met  3/19/2025 1343 by Isha Guaman RN  Outcome: Progressing     Problem: Gastrointestinal Bleeding  Goal: Optimal Coping with Acute Illness  3/19/2025 1554 by Isha Guaman RN  Outcome: Met  3/19/2025 1343 by Isha Guaman RN  Outcome: Progressing  Goal: Hemostasis  3/19/2025 1554 by Isha Guaman RN  Outcome: Met  3/19/2025 1343 by Isha Guaman, RN  Outcome: Progressing     Problem: Sepsis/Septic Shock  Goal: Optimal Coping  3/19/2025 1554 by Isha Guaman RN  Outcome: Met  3/19/2025 1343 by Isha Guaman RN  Outcome: Progressing  Goal: Absence of Bleeding  3/19/2025 1554 by Isha Guaman, RN  Outcome: Met  3/19/2025 1343 by Isha Guaman RN  Outcome: Progressing  Goal: Blood Glucose Level Within Targeted Range  3/19/2025 1554 by Isha Guaman, RN  Outcome: Met  3/19/2025 1343 by Isha Guaman, RN  Outcome:  Progressing  Goal: Absence of Infection Signs and Symptoms  3/19/2025 1554 by Isha Guaman, RN  Outcome: Met  3/19/2025 1343 by Isha Guaman RN  Outcome: Progressing  Goal: Optimal Nutrition Intake  3/19/2025 1554 by Isha Guaman, RN  Outcome: Met  3/19/2025 1343 by Isha Guaman RN  Outcome: Progressing     Problem: Infection  Goal: Absence of Infection Signs and Symptoms  3/19/2025 1554 by Isha Guaman, RN  Outcome: Met  3/19/2025 1343 by Isha Guaman, RN  Outcome: Progressing

## 2025-03-20 ENCOUNTER — RESULTS FOLLOW-UP (OUTPATIENT)
Dept: GASTROENTEROLOGY | Facility: CLINIC | Age: 45
End: 2025-03-20

## 2025-03-20 LAB
ACID FAST MOD KINY STN SPEC: NORMAL
BACTERIA SPEC ANAEROBE CULT: NORMAL

## 2025-03-21 LAB
BACTERIA BLD CULT: NORMAL
BACTERIA BLD CULT: NORMAL
BACTERIA SPEC AEROBE CULT: NO GROWTH

## 2025-03-27 LAB — FUNGUS SPEC CULT: NORMAL

## 2025-05-31 ENCOUNTER — HOSPITAL ENCOUNTER (EMERGENCY)
Facility: HOSPITAL | Age: 45
Discharge: HOME | End: 2025-05-31
Attending: EMERGENCY MEDICINE
Payer: COMMERCIAL

## 2025-05-31 VITALS
DIASTOLIC BLOOD PRESSURE: 94 MMHG | SYSTOLIC BLOOD PRESSURE: 155 MMHG | HEART RATE: 75 BPM | HEIGHT: 63 IN | RESPIRATION RATE: 20 BRPM | BODY MASS INDEX: 51.03 KG/M2 | WEIGHT: 288 LBS | TEMPERATURE: 98 F | OXYGEN SATURATION: 99 %

## 2025-05-31 DIAGNOSIS — J34.89 SINUS PRESSURE: ICD-10-CM

## 2025-05-31 DIAGNOSIS — J32.9 SINUSITIS, UNSPECIFIED CHRONICITY, UNSPECIFIED LOCATION: Primary | ICD-10-CM

## 2025-05-31 DIAGNOSIS — R51.9 NONINTRACTABLE HEADACHE, UNSPECIFIED CHRONICITY PATTERN, UNSPECIFIED HEADACHE TYPE: ICD-10-CM

## 2025-05-31 PROCEDURE — 99284 EMERGENCY DEPT VISIT MOD MDM: CPT | Mod: 25

## 2025-05-31 PROCEDURE — 25000242 PHARM REV CODE 250 ALT 637 W/ HCPCS: Performed by: PHYSICIAN ASSISTANT

## 2025-05-31 PROCEDURE — 96374 THER/PROPH/DIAG INJ IV PUSH: CPT

## 2025-05-31 PROCEDURE — 25000003 PHARM REV CODE 250: Performed by: PHYSICIAN ASSISTANT

## 2025-05-31 PROCEDURE — 63600175 PHARM REV CODE 636 W HCPCS: Mod: TB,JZ | Performed by: PHYSICIAN ASSISTANT

## 2025-05-31 PROCEDURE — 96375 TX/PRO/DX INJ NEW DRUG ADDON: CPT

## 2025-05-31 PROCEDURE — 96361 HYDRATE IV INFUSION ADD-ON: CPT

## 2025-05-31 RX ORDER — FLUTICASONE PROPIONATE 50 MCG
2 SPRAY, SUSPENSION (ML) NASAL ONCE
Status: COMPLETED | OUTPATIENT
Start: 2025-05-31 | End: 2025-05-31

## 2025-05-31 RX ORDER — METHYLPREDNISOLONE SOD SUCC 125 MG
125 VIAL (EA) INJECTION
Status: COMPLETED | OUTPATIENT
Start: 2025-05-31 | End: 2025-05-31

## 2025-05-31 RX ORDER — DIPHENHYDRAMINE HYDROCHLORIDE 50 MG/ML
12.5 INJECTION, SOLUTION INTRAMUSCULAR; INTRAVENOUS
Status: COMPLETED | OUTPATIENT
Start: 2025-05-31 | End: 2025-05-31

## 2025-05-31 RX ORDER — KETOROLAC TROMETHAMINE 30 MG/ML
15 INJECTION, SOLUTION INTRAMUSCULAR; INTRAVENOUS
Status: COMPLETED | OUTPATIENT
Start: 2025-05-31 | End: 2025-05-31

## 2025-05-31 RX ADMIN — FLUTICASONE PROPIONATE 100 MCG: 50 SPRAY, METERED NASAL at 05:05

## 2025-05-31 RX ADMIN — SODIUM CHLORIDE 1000 ML: 9 INJECTION, SOLUTION INTRAVENOUS at 04:05

## 2025-05-31 RX ADMIN — METHYLPREDNISOLONE SODIUM SUCCINATE 125 MG: 125 INJECTION, POWDER, FOR SOLUTION INTRAMUSCULAR; INTRAVENOUS at 06:05

## 2025-05-31 RX ADMIN — KETOROLAC TROMETHAMINE 15 MG: 30 INJECTION, SOLUTION INTRAMUSCULAR at 04:05

## 2025-05-31 RX ADMIN — DIPHENHYDRAMINE HYDROCHLORIDE 12.5 MG: 50 INJECTION, SOLUTION INTRAMUSCULAR; INTRAVENOUS at 06:05

## 2025-07-01 ENCOUNTER — HOSPITAL ENCOUNTER (OUTPATIENT)
Dept: PREADMISSION TESTING | Facility: HOSPITAL | Age: 45
Discharge: HOME | End: 2025-07-01
Attending: INTERNAL MEDICINE
Payer: COMMERCIAL

## 2025-07-01 VITALS
TEMPERATURE: 98 F | BODY MASS INDEX: 48.55 KG/M2 | RESPIRATION RATE: 18 BRPM | WEIGHT: 274 LBS | OXYGEN SATURATION: 99 % | HEART RATE: 79 BPM | HEIGHT: 63 IN

## 2025-07-01 DIAGNOSIS — Z01.818 PREOP TESTING: Primary | ICD-10-CM

## 2025-07-01 LAB
ANION GAP (SMH): 6 MMOL/L (ref 8–16)
BUN SERPL-MCNC: 12 MG/DL (ref 6–20)
CALCIUM SERPL-MCNC: 9.3 MG/DL (ref 8.7–10.5)
CHLORIDE SERPL-SCNC: 104 MMOL/L (ref 95–110)
CO2 SERPL-SCNC: 27 MMOL/L (ref 23–29)
CREAT SERPL-MCNC: 1 MG/DL (ref 0.5–1.4)
ERYTHROCYTE [DISTWIDTH] IN BLOOD BY AUTOMATED COUNT: 21.7 % (ref 11.5–14.5)
GFR SERPLBLD CREATININE-BSD FMLA CKD-EPI: >60 ML/MIN/1.73/M2
GLUCOSE SERPL-MCNC: 96 MG/DL (ref 70–110)
HCT VFR BLD AUTO: 27.8 % (ref 37–48.5)
HGB BLD-MCNC: 7.7 GM/DL (ref 12–16)
MCH RBC QN AUTO: 17.7 PG (ref 27–31)
MCHC RBC AUTO-ENTMCNC: 27.7 G/DL (ref 32–36)
MCV RBC AUTO: 64 FL (ref 82–98)
PLATELET # BLD AUTO: 468 K/UL (ref 150–450)
PMV BLD AUTO: ABNORMAL FL
POTASSIUM SERPL-SCNC: 3.6 MMOL/L (ref 3.5–5.1)
RBC # BLD AUTO: 4.34 M/UL (ref 4–5.4)
SODIUM SERPL-SCNC: 137 MMOL/L (ref 136–145)
WBC # BLD AUTO: 10.35 K/UL (ref 3.9–12.7)

## 2025-07-01 PROCEDURE — 82310 ASSAY OF CALCIUM: CPT | Performed by: STUDENT IN AN ORGANIZED HEALTH CARE EDUCATION/TRAINING PROGRAM

## 2025-07-01 PROCEDURE — 85027 COMPLETE CBC AUTOMATED: CPT | Performed by: STUDENT IN AN ORGANIZED HEALTH CARE EDUCATION/TRAINING PROGRAM

## 2025-07-01 PROCEDURE — 36415 COLL VENOUS BLD VENIPUNCTURE: CPT | Performed by: STUDENT IN AN ORGANIZED HEALTH CARE EDUCATION/TRAINING PROGRAM

## 2025-07-02 ENCOUNTER — ANESTHESIA EVENT (OUTPATIENT)
Dept: SURGERY | Facility: HOSPITAL | Age: 45
End: 2025-07-02
Payer: COMMERCIAL

## 2025-07-03 ENCOUNTER — ANESTHESIA (OUTPATIENT)
Dept: SURGERY | Facility: HOSPITAL | Age: 45
End: 2025-07-03
Payer: COMMERCIAL

## 2025-07-03 ENCOUNTER — HOSPITAL ENCOUNTER (OUTPATIENT)
Facility: HOSPITAL | Age: 45
Discharge: HOME | End: 2025-07-03
Attending: INTERNAL MEDICINE | Admitting: INTERNAL MEDICINE
Payer: COMMERCIAL

## 2025-07-03 VITALS
HEART RATE: 78 BPM | BODY MASS INDEX: 48.55 KG/M2 | OXYGEN SATURATION: 100 % | HEIGHT: 63 IN | RESPIRATION RATE: 16 BRPM | SYSTOLIC BLOOD PRESSURE: 175 MMHG | DIASTOLIC BLOOD PRESSURE: 86 MMHG | TEMPERATURE: 98 F | WEIGHT: 274 LBS

## 2025-07-03 DIAGNOSIS — Z12.12 SCREENING FOR COLORECTAL CANCER: ICD-10-CM

## 2025-07-03 DIAGNOSIS — Z12.11 SCREENING FOR COLORECTAL CANCER: ICD-10-CM

## 2025-07-03 DIAGNOSIS — Z12.11 SCREENING FOR COLON CANCER: Primary | ICD-10-CM

## 2025-07-03 PROCEDURE — 25000003 PHARM REV CODE 250

## 2025-07-03 PROCEDURE — 37000009 HC ANESTHESIA EA ADD 15 MINS: Performed by: INTERNAL MEDICINE

## 2025-07-03 PROCEDURE — 63600175 PHARM REV CODE 636 W HCPCS

## 2025-07-03 PROCEDURE — 45385 COLONOSCOPY W/LESION REMOVAL: CPT | Mod: 33 | Performed by: INTERNAL MEDICINE

## 2025-07-03 PROCEDURE — 37000008 HC ANESTHESIA 1ST 15 MINUTES: Performed by: INTERNAL MEDICINE

## 2025-07-03 PROCEDURE — 27201114 HC TRAP (ANY): Performed by: INTERNAL MEDICINE

## 2025-07-03 PROCEDURE — 27201089 HC SNARE, DISP (ANY): Performed by: INTERNAL MEDICINE

## 2025-07-03 RX ORDER — LIDOCAINE HYDROCHLORIDE 20 MG/ML
INJECTION, SOLUTION EPIDURAL; INFILTRATION; INTRACAUDAL; PERINEURAL
Status: DISCONTINUED | OUTPATIENT
Start: 2025-07-03 | End: 2025-07-03

## 2025-07-03 RX ORDER — PROPOFOL 10 MG/ML
VIAL (ML) INTRAVENOUS
Status: DISCONTINUED | OUTPATIENT
Start: 2025-07-03 | End: 2025-07-03

## 2025-07-03 RX ADMIN — PROPOFOL 40 MG: 10 INJECTION, EMULSION INTRAVENOUS at 07:07

## 2025-07-03 RX ADMIN — PROPOFOL 60 MG: 10 INJECTION, EMULSION INTRAVENOUS at 07:07

## 2025-07-03 RX ADMIN — PROPOFOL 80 MG: 10 INJECTION, EMULSION INTRAVENOUS at 07:07

## 2025-07-03 RX ADMIN — LIDOCAINE HYDROCHLORIDE 50 MG: 20 INJECTION, SOLUTION INTRAVENOUS at 07:07

## 2025-07-03 RX ADMIN — SODIUM CHLORIDE: 900 INJECTION INTRAVENOUS at 07:07

## 2025-07-03 NOTE — H&P
GASTROENTEROLOGY PRE-PROCEDURE H&P NOTE  Patient Name: Mirlande Boogie  Patient MRN: [id number]  Patient : 1980    Service date: 7/3/2025    PCP: Carrie Wild MD    No chief complaint on file.      HPI: Patient is a 45 y.o. female with PMHx as below here for evaluation of for screening with severe DAVIDSON in setting of menorrhagia.     Lab Results   Component Value Date    WBC 10.35 2025    HGB 7.7 (L) 2025    HCT 27.8 (L) 2025    MCV 64 (L) 2025     (H) 2025       Lab Results   Component Value Date    IRON 10 (L) 03/15/2025    TRANSFERRIN 336 03/15/2025    TIBC 470 (H) 03/15/2025    LABIRON 4 (L) 10/12/2020    FESATURATED 2 (L) 03/15/2025      BMP  Lab Results   Component Value Date     2025    K 3.6 2025     2025    CO2 27 2025    BUN 12 2025    CREATININE 1.0 2025    CALCIUM 9.3 2025    ANIONGAP 6 (L) 2025    EGFRNORACEVR >60 2025         Past Medical History:  Past Medical History:   Diagnosis Date    Anemia     Back pain     CHO intolerance     Elevated BP without diagnosis of hypertension 2018    Excessive daytime sleepiness     General anesthetics causing adverse effect in therapeutic use     highly sensitive    GERD (gastroesophageal reflux disease)     ??    Hypothyroidism     pt states unaware of this    Morbid obesity 2018    Obstructive sleep apnea 2019    Plantar fasciitis     PONV (postoperative nausea and vomiting)     Ventral hernia         Past Surgical History:  Past Surgical History:   Procedure Laterality Date    BREAST SURGERY      reduction x2     SECTION      x2    ESOPHAGOGASTRODUODENOSCOPY N/A 2025    Procedure: EGD (ESOPHAGOGASTRODUODENOSCOPY);  Surgeon: Milind Voss DO;  Location: [facility];  Service: Endoscopy;  Laterality: N/A;    HERNIA REPAIR      [id number]    PANNICULECTOMY N/A 2021    Procedure: PANNICULECTOMY;  Surgeon: Alessandro ALEXANDER  "MD Desiree;  Location: [facility];  Service: Plastics;  Laterality: N/A;    WISDOM TOOTH EXTRACTION      all 4 removed        Home Medications:  Prescriptions Prior to Admission[1]            Review of patient's allergies indicates:   Allergen Reactions    Anesthetics - jake type- parabens Other (See Comments)     Hard to wake up     Codeine      sensitive       Social History:   Social History     Occupational History    Not on file   Tobacco Use    Smoking status: Never    Smokeless tobacco: Never   Substance and Sexual Activity    Alcohol use: No    Drug use: No    Sexual activity: Never       Family History:   Family History   Problem Relation Name Age of Onset    Pulmonary fibrosis Mother      Early death Father  64       Review of Systems:  A 10 point review of systems was performed and was normal, except as mentioned in the HPI, including constitutional, HEENT, heme, lymph, cardiovascular, respiratory, gastrointestinal, genitourinary, neurologic, endocrine, psychiatric and musculoskeletal.      OBJECTIVE:    Physical Exam:  24 Hour Vital Sign Ranges:    Most recent vitals: LMP 06/26/2025 (Exact Date)   Breastfeeding No    GEN: well-developed, well-nourished, awake and alert, non-toxic appearing adult  HEENT: PERRL, sclera anicteric, oral mucosa pink and moist without lesion  NECK: trachea midline; Good ROM  CV: regular rate and rhythm, no murmurs or gallops  RESP: clear to auscultation bilaterally, no wheezes, rhonci or rales  ABD: soft, non-tender, non-distended, normal bowel sounds  EXT: no swelling or edema, 2+ pulses distally  SKIN: no rashes or jaundice  PSYCH: normal affect    Labs:   Recent Labs     07/01/25  0750   WBC 10.35   MCV 64*   *     Recent Labs     07/01/25  0750      K 3.6      CO2 27   BUN 12   GLU 96     No results for input(s): "ALB" in the last 72 hours.    Invalid input(s): "ALKP", "SGOT", "SGPT", "TBIL", "DBIL", "TPRO"  No results for input(s): "PT", "INR", "PTT" " in the last 72 hours.      IMPRESSION / RECOMMENDATIONS:  Colonoscopy with interventions as warranted.   RIsks, benefits, alternatives discussed in detail regarding upcoming procedures and sedation. Some of the more common endoscopic complications include but not limited to immediate or delayed perforation, bleeding, infections, pain, inadvertent injury to surrounding tissue / organs and possible need for surgical evaluation. Patient expressed understanding, all questions answered and will proceed with procedure as planned.     Amandeep Loya  7/3/2025  7:04 AM           [1]   Medications Prior to Admission   Medication Sig Dispense Refill Last Dose/Taking    butalbital-acetaminophen-caff -40 mg Cap take 1 tablet by mouth every four to six hours as needed for headache; MAX OF 4 TABLETS PER 24 HOURS. 30 capsule 1     hydroCHLOROthiazide (HYDRODIURIL) 25 MG tablet Take 1 tablet (25 mg total) by mouth once daily. 30 tablet 6     tirzepatide (MOUNJARO) 15 mg/0.5 mL PnIj inject 15mg under the skin once a week for 4 weeks 4 pen 3     tirzepatide (MOUNJARO) 15 mg/0.5 mL PnIj Inject 15mg subcutaneously into the skin once a week. 2 mL 3     tirzepatide (MOUNJARO) 15 mg/0.5 mL PnIj Inject 15 mg into the skin every 7 days. 2 mL 3     tirzepatide (MOUNJARO) 15 mg/0.5 mL PnIj inject 15mg into the skin once weekly 2 mL 1

## 2025-07-03 NOTE — ANESTHESIA PREPROCEDURE EVALUATION
"                                                                                                             07/03/2025  Mirlande Boogie is a 45 y.o., female.      Pre-op Assessment    I have reviewed the Patient Summary Reports.     I have reviewed the Nursing Notes. I have reviewed the NPO Status.   I have reviewed the Medications.     Review of Systems  Anesthesia Hx:    Patient says that she is very sensitive to anesthetics and slow to wake up.  She is unaware of the "jake-type parabens" listed in her allergy list.           Denies Family Hx of Anesthesia complications.   Personal Hx of Anesthesia complications, Post-Operative Nausea/Vomiting, in the past, but not with recent anesthetics / prophylaxis                    Social:  Non-Smoker, No Alcohol Use       Hematology/Oncology:    Oncology Normal    -- Anemia:                                  EENT/Dental:  EENT/Dental Normal           Cardiovascular:     Hypertension           hyperlipidemia                               Pulmonary:       Recent URI (Sinus infection 1 month ago, treated and now completely resolved) Sleep Apnea, CPAP           Education provided regarding risk of obstructive sleep apnea            Renal/:  Renal/ Normal                 Hepatic/GI:     GERD, well controlled Liver Disease,  History of GI bleed             Musculoskeletal:  Musculoskeletal Normal                Neurological:  Neurology Normal                                      Endocrine:   Hypothyroidism  Patient takes Mounjaro for "insulin resistance."  Last dose 2 weeks ago.  No GI symptoms.      Morbid Obesity / BMI > 40  Psych:  Psychiatric Normal                    Physical Exam  General: Cooperative, Alert and Oriented    Airway:  Mallampati: III   Mouth Opening: Normal  TM Distance: > 6 cm  Tongue: Large  Neck ROM: Normal ROM    Dental:  Intact    Chest/Lungs:  Clear to auscultation, Normal Respiratory Rate    Heart:  Rate: Normal  Rhythm: Regular Rhythm  Sounds: " Normal        Anesthesia Plan  Type of Anesthesia, risks & benefits discussed:    Anesthesia Type: Gen Natural Airway  Intra-op Monitoring Plan: Standard ASA Monitors  Induction:  IV  Informed Consent: Informed consent signed with the Patient and all parties understand the risks and agree with anesthesia plan.  All questions answered.   ASA Score: 3    Ready For Surgery From Anesthesia Perspective.     .

## 2025-07-03 NOTE — PROVATION PATIENT INSTRUCTIONS
Discharge Summary/Instructions after an Endoscopic Procedure  Patient Name: Mirlande Boogie  Patient MRN: [id number]  Patient YOB: 1980  Thursday, July 3, 2025  Amandeep Loya MD  RESTRICTIONS:  During your procedure today, you received medications for sedation.  These   medications may affect your judgment, balance and coordination.  Therefore,   for 24 hours, you have the following restrictions:   - DO NOT drive a car, operate machinery, make legal/financial decisions,   sign important papers or drink alcohol.    ACTIVITY:  Today: no heavy lifting, straining or running due to procedural   sedation/anesthesia.  The following day: return to full activity including work.  DIET:  Eat and drink normally unless instructed otherwise.     TREATMENT FOR COMMON SIDE EFFECTS:  - Mild abdominal pain, nausea, belching, bloating or excessive gas:  rest,   eat lightly and use a heating pad.  - Sore Throat: treat with throat lozenges and/or gargle with warm salt   water.  - Because air was used during the procedure, expelling large amounts of air   from your rectum or belching is normal.  - If a bowel prep was taken, you may not have a bowel movement for 1-3 days.    This is normal.  SYMPTOMS TO WATCH FOR AND REPORT TO YOUR PHYSICIAN:  1. Abdominal pain or bloating, other than gas cramps.  2. Chest pain.  3. Back pain.  4. Signs of infection such as: chills or fever occurring within 24 hours   after the procedure.  5. Rectal bleeding, which would show as bright red, maroon, or black stools.   (A tablespoon of blood from the rectum is not serious, especially if   hemorrhoids are present.)  6. Vomiting.  7. Weakness or dizziness.  GO DIRECTLY TO THE NEAREST EMERGENCY ROOM IF YOU HAVE ANY OF THE FOLLOWING:      Difficulty breathing              Chills and/or fever over 101 F   Persistent vomiting and/or vomiting blood   Severe abdominal pain   Severe chest pain   Black, tarry stools   Bleeding- more than one  tablespoon   Any other symptom or condition that you feel may need urgent attention  Your doctor recommends these additional instructions:  If any biopsies were taken, your doctors clinic will contact you in 1 to 2   weeks with any results.  - Patient has a contact number available for emergencies.  The signs and   symptoms of potential delayed complications were discussed with the   patient.  Return to normal activities tomorrow.  Written discharge   instructions were provided to the patient.   - Resume previous diet.   - Continue present medications.   - Await pathology results.   - Repeat colonoscopy in 5 years for surveillance.   - Return to GI clinic PRN.   - Recommend IV Iron infusions  - Past EGD 3/25 unremarkable.  - Proceed to GYN procedure for menorrhagia.  - Discharge patient to home (with escort).  For questions, problems or results please call your physician - Amandeep Loya MD at Work:  [phone].  Replaced by [facility], EMERGENCY ROOM PHONE NUMBER: [phone]  IF A COMPLICATION OR EMERGENCY SITUATION ARISES AND YOU ARE UNABLE TO REACH   YOUR PHYSICIAN - GO DIRECTLY TO THE EMERGENCY ROOM.  MD Amandeep Pepper MD  7/3/2025 8:08:28 AM  This report has been verified and signed electronically.  Dear patient,  As a result of recent federal legislation (The Federal Cures Act), you may   receive lab or pathology results from your procedure in your [facility]   account before your physician is able to contact you. Your physician or   their representative will relay the results to you with their   recommendations at their soonest availability.  Thank you,  PROVATION

## 2025-07-03 NOTE — ANESTHESIA POSTPROCEDURE EVALUATION
Anesthesia Post Evaluation    Patient: Mirlande Boogie    Procedure(s) Performed: Procedure(s) (LRB):  COLONOSCOPY (N/A)    Final Anesthesia Type: general      Patient location during evaluation: GI PACU  Patient participation: Yes- Able to Participate  Level of consciousness: awake and alert  Post-procedure vital signs: reviewed and stable  Pain management: adequate  Airway patency: patent    PONV status at discharge: No PONV  Anesthetic complications: no      Cardiovascular status: stable  Respiratory status: unassisted  Hydration status: euvolemic  Follow-up not needed.              Vitals Value Taken Time   /86 07/03/25 08:38   Temp 36.8 °C (98.2 °F) 07/03/25 08:10   Pulse 78 07/03/25 08:39   Resp 16 07/03/25 08:25   SpO2 100 % 07/03/25 08:39   Vitals shown include unfiled device data.      No case tracking events are documented in the log.      Pain/Artie Score: No data recorded

## 2025-07-03 NOTE — TRANSFER OF CARE
"Anesthesia Transfer of Care Note    Patient: Mirlande Boogie    Procedure(s) Performed: Procedure(s) (LRB):  COLONOSCOPY (N/A)    Patient location: GI    Anesthesia Type: general    Transport from OR: Transported from OR on room air with adequate spontaneous ventilation    Post pain: adequate analgesia    Post assessment: no apparent anesthetic complications    Post vital signs: stable    Level of consciousness: awake    Nausea/Vomiting: no nausea/vomiting    Complications: none    Transfer of care protocol was followed      Last vitals: Visit Vitals  BP (!) 182/85 (BP Location: Left arm, Patient Position: Lying)   Pulse 81   Temp 36.7 °C (98.1 °F) (Oral)   Resp 16   Ht 5' 3" (1.6 m)   Wt 124.3 kg (274 lb)   LMP 06/26/2025 (Exact Date)   SpO2 100%   Breastfeeding No   BMI 48.54 kg/m²     "

## 2025-07-30 ENCOUNTER — PATIENT MESSAGE (OUTPATIENT)
Dept: GASTROENTEROLOGY | Facility: CLINIC | Age: 45
End: 2025-07-30
Payer: COMMERCIAL

## 2025-07-30 NOTE — TELEPHONE ENCOUNTER
Looks like that was Dr. Amandeep Loya that recommended IV iron. Recommend she contact his office unless she wants to be seen by someone in our office.

## 2025-07-31 ENCOUNTER — TELEPHONE (OUTPATIENT)
Dept: HEMATOLOGY/ONCOLOGY | Facility: CLINIC | Age: 45
End: 2025-07-31
Payer: COMMERCIAL

## 2025-07-31 NOTE — TELEPHONE ENCOUNTER
Patient return called to reschedule appt.  Patient was rescheduled to Tea NP on 08/08 at 10:30.  Patient verbal understanding of time, date, location of appointment and had no further questions. Also directed patient to my chart for appointment details

## 2025-07-31 NOTE — TELEPHONE ENCOUNTER
Called  patient about scheduled appt and left detail voicemail with telephone number to call back.

## 2025-08-04 ENCOUNTER — TELEPHONE (OUTPATIENT)
Dept: HEMATOLOGY/ONCOLOGY | Facility: CLINIC | Age: 45
End: 2025-08-04
Payer: COMMERCIAL

## 2025-08-04 DIAGNOSIS — D64.9 SYMPTOMATIC ANEMIA: Primary | ICD-10-CM

## 2025-08-04 NOTE — TELEPHONE ENCOUNTER
----- Message from Nurse Jones sent at 7/31/2025 10:10 AM CDT -----  Regarding: Sooner Appt  Good morning,    Patient is looking to get a sooner appt if you have one available.  She is scheduled on 08/08 in [place] with one of my NP. If there is a sooner appt please give her a call to schedule.    Thank you,    Karen George LPN Navigator  [facility] [address] [place] [address]  [phone]-main line  [phone]

## 2025-08-04 NOTE — NURSING
Received message from [facility] patient would like to be see in [place] spoke to Ms Boogie appt rescheduled time and location confirmed she verbalized understanding   Oncology Navigation   Intake  Cancer Type: Benign hem (DAVIDSON)  Type of Referral: -- (Dr Amandeep Loya)  Date of Referral: 08/04/25  Initial Nurse Navigator Contact: 08/04/25  Referral to Initial Contact Timeline (days): 0  First Appointment Available: 08/07/25  Appointment Date: -- (Dr De Luna)  First Available Date vs. Scheduled Date (days): 0     Treatment                              Acuity      Follow Up  No follow-ups on file.

## 2025-08-06 ENCOUNTER — TELEPHONE (OUTPATIENT)
Facility: CLINIC | Age: 45
End: 2025-08-06
Payer: COMMERCIAL

## 2025-08-06 NOTE — TELEPHONE ENCOUNTER
LVM to confirm pt upcoming appt with  on 8/7/2025. Office number was left on  for any further questions or concerns.

## 2025-08-07 ENCOUNTER — OFFICE VISIT (OUTPATIENT)
Dept: HEMATOLOGY/ONCOLOGY | Facility: CLINIC | Age: 45
End: 2025-08-07
Payer: COMMERCIAL

## 2025-08-07 ENCOUNTER — LAB VISIT (OUTPATIENT)
Dept: LAB | Facility: HOSPITAL | Age: 45
End: 2025-08-07
Attending: INTERNAL MEDICINE
Payer: COMMERCIAL

## 2025-08-07 VITALS
WEIGHT: 261.69 LBS | DIASTOLIC BLOOD PRESSURE: 111 MMHG | SYSTOLIC BLOOD PRESSURE: 198 MMHG | TEMPERATURE: 98 F | RESPIRATION RATE: 16 BRPM | HEIGHT: 63 IN | BODY MASS INDEX: 46.37 KG/M2 | HEART RATE: 78 BPM

## 2025-08-07 DIAGNOSIS — D50.0 IRON DEFICIENCY ANEMIA DUE TO CHRONIC BLOOD LOSS: Primary | ICD-10-CM

## 2025-08-07 DIAGNOSIS — D64.9 SYMPTOMATIC ANEMIA: ICD-10-CM

## 2025-08-07 PROBLEM — D50.9 IRON DEFICIENCY ANEMIA: Status: ACTIVE | Noted: 2025-08-07

## 2025-08-07 LAB
ABSOLUTE EOSINOPHIL (SMH): 0.19 K/UL
ABSOLUTE MONOCYTE (SMH): 0.7 K/UL (ref 0.3–1)
ABSOLUTE NEUTROPHIL COUNT (SMH): 4.1 K/UL (ref 1.8–7.7)
ALBUMIN SERPL-MCNC: 4 G/DL (ref 3.5–5.2)
ALP SERPL-CCNC: 67 UNIT/L (ref 55–135)
ALT SERPL-CCNC: 6 UNIT/L (ref 10–44)
ANION GAP (SMH): 7 MMOL/L (ref 8–16)
ANISOCYTOSIS BLD QL SMEAR: SLIGHT
AST SERPL-CCNC: 14 UNIT/L (ref 10–40)
BASOPHILS # BLD AUTO: 0.07 K/UL
BASOPHILS NFR BLD AUTO: 0.8 %
BILIRUB SERPL-MCNC: 0.9 MG/DL (ref 0.1–1)
BUN SERPL-MCNC: 12 MG/DL (ref 6–20)
CALCIUM SERPL-MCNC: 9.5 MG/DL (ref 8.7–10.5)
CHLORIDE SERPL-SCNC: 104 MMOL/L (ref 95–110)
CO2 SERPL-SCNC: 28 MMOL/L (ref 23–29)
CREAT SERPL-MCNC: 0.9 MG/DL (ref 0.5–1.4)
ERYTHROCYTE [DISTWIDTH] IN BLOOD BY AUTOMATED COUNT: 22.7 % (ref 11.5–14.5)
FERRITIN SERPL-MCNC: 3.6 NG/ML (ref 20–300)
GFR SERPLBLD CREATININE-BSD FMLA CKD-EPI: >60 ML/MIN/1.73/M2
GLUCOSE SERPL-MCNC: 122 MG/DL (ref 70–110)
HCT VFR BLD AUTO: 30 % (ref 37–48.5)
HGB BLD-MCNC: 8 GM/DL (ref 12–16)
IMM GRANULOCYTES # BLD AUTO: 0.04 K/UL (ref 0–0.04)
IMM GRANULOCYTES NFR BLD AUTO: 0.5 % (ref 0–0.5)
IRON SATN MFR SERPL: <10 % (ref 20–50)
IRON SERPL-MCNC: 22 UG/DL (ref 30–160)
LDH SERPL-CCNC: 163 U/L (ref 110–260)
LYMPHOCYTES # BLD AUTO: 3.49 K/UL (ref 1–4.8)
MCH RBC QN AUTO: 17.1 PG (ref 27–31)
MCHC RBC AUTO-ENTMCNC: 26.7 G/DL (ref 32–36)
MCV RBC AUTO: 64 FL (ref 82–98)
NUCLEATED RBC (/100WBC) (SMH): 0 /100 WBC
PLATELET # BLD AUTO: 444 K/UL (ref 150–450)
PLATELET BLD QL SMEAR: NORMAL
PMV BLD AUTO: ABNORMAL FL
POTASSIUM SERPL-SCNC: 3.5 MMOL/L (ref 3.5–5.1)
PROT SERPL-MCNC: 8 GM/DL (ref 6–8.4)
RBC # BLD AUTO: 4.68 M/UL (ref 4–5.4)
RELATIVE EOSINOPHIL (SMH): 2.2 % (ref 0–8)
RELATIVE LYMPHOCYTE (SMH): 40.6 % (ref 18–48)
RELATIVE MONOCYTE (SMH): 8.1 % (ref 4–15)
RELATIVE NEUTROPHIL (SMH): 47.8 % (ref 38–73)
RETICS/RBC NFR AUTO: 1.9 % (ref 0.5–2.5)
SODIUM SERPL-SCNC: 139 MMOL/L (ref 136–145)
TIBC SERPL-MCNC: 512 UG/DL (ref 250–450)
TRANSFERRIN SERPL-MCNC: 366 MG/DL (ref 200–375)
WBC # BLD AUTO: 8.6 K/UL (ref 3.9–12.7)

## 2025-08-07 PROCEDURE — 1159F MED LIST DOCD IN RCRD: CPT | Mod: CPTII,S$GLB,, | Performed by: INTERNAL MEDICINE

## 2025-08-07 PROCEDURE — 83615 LACTATE (LD) (LDH) ENZYME: CPT

## 2025-08-07 PROCEDURE — 3077F SYST BP >= 140 MM HG: CPT | Mod: CPTII,S$GLB,, | Performed by: INTERNAL MEDICINE

## 2025-08-07 PROCEDURE — 3008F BODY MASS INDEX DOCD: CPT | Mod: CPTII,S$GLB,, | Performed by: INTERNAL MEDICINE

## 2025-08-07 PROCEDURE — 85025 COMPLETE CBC W/AUTO DIFF WBC: CPT

## 2025-08-07 PROCEDURE — 99999 PR PBB SHADOW E&M-EST. PATIENT-LVL V: CPT | Mod: PBBFAC,,, | Performed by: INTERNAL MEDICINE

## 2025-08-07 PROCEDURE — 99204 OFFICE O/P NEW MOD 45 MIN: CPT | Mod: S$GLB,,, | Performed by: INTERNAL MEDICINE

## 2025-08-07 PROCEDURE — 85045 AUTOMATED RETICULOCYTE COUNT: CPT

## 2025-08-07 PROCEDURE — 83540 ASSAY OF IRON: CPT

## 2025-08-07 PROCEDURE — 82040 ASSAY OF SERUM ALBUMIN: CPT

## 2025-08-07 PROCEDURE — 82728 ASSAY OF FERRITIN: CPT

## 2025-08-07 PROCEDURE — 3080F DIAST BP >= 90 MM HG: CPT | Mod: CPTII,S$GLB,, | Performed by: INTERNAL MEDICINE

## 2025-08-07 PROCEDURE — 36415 COLL VENOUS BLD VENIPUNCTURE: CPT

## 2025-08-07 RX ORDER — HEPARIN 100 UNIT/ML
500 SYRINGE INTRAVENOUS
OUTPATIENT
Start: 2025-08-07

## 2025-08-07 RX ORDER — EPINEPHRINE 0.3 MG/.3ML
0.3 INJECTION SUBCUTANEOUS ONCE AS NEEDED
OUTPATIENT
Start: 2025-08-07

## 2025-08-07 RX ORDER — SODIUM CHLORIDE 0.9 % (FLUSH) 0.9 %
10 SYRINGE (ML) INJECTION
OUTPATIENT
Start: 2025-08-07

## 2025-08-07 NOTE — Clinical Note
Two sets of blood.  One set for today.  Then patient will get IV iron therapy Injectafer x2 protocol.  I will see patient 2 weeks after completion of IV iron therapy with the blood work for recheck.

## 2025-08-07 NOTE — PROGRESS NOTES
HPI      45 years old female self-referral for iron deficiency anemia.    Colonoscopy 07/03/2025 internal hemorrhoids 2 small polyps descending colon removed.    EGD no evidence of bleeding.    Fatigue malaise    Past Medical History:   Diagnosis Date    Anemia     Back pain     CHO intolerance     Elevated BP without diagnosis of hypertension 05/29/2018    Excessive daytime sleepiness     General anesthetics causing adverse effect in therapeutic use     highly sensitive    GERD (gastroesophageal reflux disease)     ??    Hypothyroidism     pt states unaware of this    Morbid obesity 05/06/2018    Obstructive sleep apnea 2019    Plantar fasciitis     PONV (postoperative nausea and vomiting)     Ventral hernia      Social History     Socioeconomic History    Marital status:    Tobacco Use    Smoking status: Never    Smokeless tobacco: Never   Substance and Sexual Activity    Alcohol use: No    Drug use: No    Sexual activity: Never     Social Drivers of Health     Financial Resource Strain: High Risk (8/7/2025)    Overall Financial Resource Strain (CARDIA)     Difficulty of Paying Living Expenses: Hard   Food Insecurity: Food Insecurity Present (8/7/2025)    Hunger Vital Sign     Worried About Running Out of Food in the Last Year: Never true     Ran Out of Food in the Last Year: Sometimes true   Transportation Needs: No Transportation Needs (8/7/2025)    PRAPARE - Transportation     Lack of Transportation (Medical): No     Lack of Transportation (Non-Medical): No   Physical Activity: Insufficiently Active (8/7/2025)    Exercise Vital Sign     Days of Exercise per Week: 1 day     Minutes of Exercise per Session: 20 min   Stress: No Stress Concern Present (8/7/2025)    [employer] - Occupational Stress Questionnaire     Feeling of Stress : Not at all   Housing Stability: Low Risk  (8/7/2025)    Housing Stability Vital Sign     Unable to Pay for Housing in the Last Year: No      Number of Times Moved in the Last Year: 0     Homeless in the Last Year: No         Subjective      Review of Systems   Constitutional: Negative for appetite change, fatigue and unexpected weight change.   HENT: Negative for mouth sores.   Eyes: Negative for visual disturbance.   Respiratory: Negative for cough and shortness of breath.   Cardiovascular: Negative for chest pain.   Gastrointestinal: Negative for diarrhea.   Genitourinary: Negative for frequency.   Musculoskeletal: Negative for back pain.   Skin: Negative for rash.   Neurological: Negative for headaches.   Hematological: Negative for adenopathy.   Psychiatric/Behavioral: The patient is not nervous/anxious.   All other systems reviewed and are negative.     Objective    Physical Exam   Vitals:    08/07/25 1309   BP: (!) 198/111   Pulse: 78   Resp:    Temp: 98 °F (36.7 °C)       Awake alert  Normal rate  Normal respiratory effort  Nonfocal  No rash no lesion    Lab Results   Component Value Date    WBC 10.35 07/01/2025    HGB 7.7 (L) 07/01/2025    HCT 27.8 (L) 07/01/2025    MCV 64 (L) 07/01/2025     (H) 07/01/2025       CMP  Sodium   Date Value Ref Range Status   07/01/2025 137 136 - 145 mmol/L Final     Potassium   Date Value Ref Range Status   07/01/2025 3.6 3.5 - 5.1 mmol/L Final     Chloride   Date Value Ref Range Status   07/01/2025 104 95 - 110 mmol/L Final     CO2   Date Value Ref Range Status   07/01/2025 27 23 - 29 mmol/L Final     Glucose   Date Value Ref Range Status   07/01/2025 96 70 - 110 mg/dL Final     BUN   Date Value Ref Range Status   07/01/2025 12 6 - 20 mg/dL Final     Creatinine   Date Value Ref Range Status   07/01/2025 1.0 0.5 - 1.4 mg/dL Final     Calcium   Date Value Ref Range Status   07/01/2025 9.3 8.7 - 10.5 mg/dL Final     Total Protein   Date Value Ref Range Status   03/18/2025 7.7 6.0 - 8.4 g/dL Final     Albumin   Date Value Ref Range Status   03/18/2025 3.3 (L) 3.5 - 5.2 g/dL Final     Total Bilirubin   Date Value  Ref Range Status   03/18/2025 1.3 (H) 0.1 - 1.0 mg/dL Final     Comment:     For infants and newborns, interpretation of results should be based  on gestational age, weight and in agreement with clinical  observations.    Premature Infant recommended reference ranges:  Up to 24 hours.............<8.0 mg/dL  Up to 48 hours............<12.0 mg/dL  3-5 days..................<15.0 mg/dL  6-29 days.................<15.0 mg/dL       Alkaline Phosphatase   Date Value Ref Range Status   03/18/2025 64 40 - 150 U/L Final     AST   Date Value Ref Range Status   03/18/2025 16 10 - 40 U/L Final     ALT   Date Value Ref Range Status   03/18/2025 7 (L) 10 - 44 U/L Final     Anion Gap   Date Value Ref Range Status   07/01/2025 6 (L) 8 - 16 mmol/L Final     eGFR   Date Value Ref Range Status   07/01/2025 >60 >60 mL/min/1.73/m2 Final   03/18/2025 >60 >60 mL/min/1.73 m^2 Final         Assessment    Microcytic anemia iron deficiency    Fatigue malaise    Prior history of transfusion to GI bleed  .  Status post colonoscopy EGD July - follows GI  > repeat iron study ferritin  > planned for Injectafer x2 follow up with lab for treatment evaluation.  Anticipating patient will need more IV iron therapy in the future  > I will see her after completion of 2 Injectafer      Plan    Symptomatic anemia  -     Ambulatory referral/consult to Hematology / Oncology

## 2025-08-08 ENCOUNTER — TELEPHONE (OUTPATIENT)
Dept: HEMATOLOGY/ONCOLOGY | Facility: CLINIC | Age: 45
End: 2025-08-08
Payer: COMMERCIAL

## 2025-08-08 NOTE — TELEPHONE ENCOUNTER
Spoke to pt. Explained that provider does not need to see pt until after she completes IV iron. Pt verbalized understanding.      ----- Message from Leslie sent at 8/8/2025  1:24 PM CDT -----  Pt had her labs done and needs an appt., to see Annamarie Hughes.      [phone]

## 2025-08-20 ENCOUNTER — PATIENT MESSAGE (OUTPATIENT)
Dept: HEMATOLOGY/ONCOLOGY | Facility: CLINIC | Age: 45
End: 2025-08-20
Payer: COMMERCIAL

## 2025-08-21 ENCOUNTER — INFUSION (OUTPATIENT)
Dept: INFUSION THERAPY | Facility: HOSPITAL | Age: 45
End: 2025-08-21
Attending: INTERNAL MEDICINE
Payer: COMMERCIAL

## 2025-08-21 VITALS
OXYGEN SATURATION: 99 % | RESPIRATION RATE: 16 BRPM | TEMPERATURE: 98 F | HEART RATE: 82 BPM | SYSTOLIC BLOOD PRESSURE: 202 MMHG | DIASTOLIC BLOOD PRESSURE: 96 MMHG

## 2025-08-21 DIAGNOSIS — K92.2 GASTROINTESTINAL HEMORRHAGE, UNSPECIFIED GASTROINTESTINAL HEMORRHAGE TYPE: ICD-10-CM

## 2025-08-21 DIAGNOSIS — D50.0 IRON DEFICIENCY ANEMIA DUE TO CHRONIC BLOOD LOSS: Primary | ICD-10-CM

## 2025-08-21 DIAGNOSIS — D64.9 SYMPTOMATIC ANEMIA: ICD-10-CM

## 2025-08-21 PROCEDURE — 96374 THER/PROPH/DIAG INJ IV PUSH: CPT

## 2025-08-21 PROCEDURE — 63600175 PHARM REV CODE 636 W HCPCS: Mod: JZ,TB | Performed by: INTERNAL MEDICINE

## 2025-08-21 PROCEDURE — 25000003 PHARM REV CODE 250: Performed by: INTERNAL MEDICINE

## 2025-08-21 PROCEDURE — A4216 STERILE WATER/SALINE, 10 ML: HCPCS | Performed by: INTERNAL MEDICINE

## 2025-08-21 RX ORDER — HEPARIN 100 UNIT/ML
500 SYRINGE INTRAVENOUS
OUTPATIENT
Start: 2025-08-21

## 2025-08-21 RX ORDER — SODIUM CHLORIDE 0.9 % (FLUSH) 0.9 %
10 SYRINGE (ML) INJECTION
Status: DISCONTINUED | OUTPATIENT
Start: 2025-08-21 | End: 2025-08-21 | Stop reason: HOSPADM

## 2025-08-21 RX ORDER — SODIUM CHLORIDE 0.9 % (FLUSH) 0.9 %
10 SYRINGE (ML) INJECTION
OUTPATIENT
Start: 2025-08-21

## 2025-08-21 RX ORDER — EPINEPHRINE 0.3 MG/.3ML
0.3 INJECTION SUBCUTANEOUS ONCE AS NEEDED
OUTPATIENT
Start: 2025-08-21 | End: 2037-01-17

## 2025-08-21 RX ADMIN — Medication 10 ML: at 11:08

## 2025-08-21 RX ADMIN — FERRIC CARBOXYMALTOSE INJECTION 750 MG: 50 INJECTION, SOLUTION INTRAVENOUS at 11:08

## 2025-08-27 ENCOUNTER — TELEPHONE (OUTPATIENT)
Dept: OBSTETRICS AND GYNECOLOGY | Facility: CLINIC | Age: 45
End: 2025-08-27
Payer: COMMERCIAL

## 2025-08-28 ENCOUNTER — INFUSION (OUTPATIENT)
Dept: INFUSION THERAPY | Facility: HOSPITAL | Age: 45
End: 2025-08-28
Attending: INTERNAL MEDICINE
Payer: COMMERCIAL

## 2025-08-28 VITALS
OXYGEN SATURATION: 98 % | DIASTOLIC BLOOD PRESSURE: 113 MMHG | RESPIRATION RATE: 18 BRPM | SYSTOLIC BLOOD PRESSURE: 214 MMHG | HEART RATE: 89 BPM | BODY MASS INDEX: 51.56 KG/M2 | HEIGHT: 63 IN | TEMPERATURE: 99 F | WEIGHT: 291 LBS

## 2025-08-28 DIAGNOSIS — K92.2 GASTROINTESTINAL HEMORRHAGE, UNSPECIFIED GASTROINTESTINAL HEMORRHAGE TYPE: ICD-10-CM

## 2025-08-28 DIAGNOSIS — D64.9 SYMPTOMATIC ANEMIA: ICD-10-CM

## 2025-08-28 DIAGNOSIS — D50.0 IRON DEFICIENCY ANEMIA DUE TO CHRONIC BLOOD LOSS: Primary | ICD-10-CM

## 2025-08-28 RX ORDER — EPINEPHRINE 0.3 MG/.3ML
0.3 INJECTION SUBCUTANEOUS ONCE AS NEEDED
OUTPATIENT
Start: 2025-08-28 | End: 2037-01-24

## 2025-08-28 RX ORDER — EPINEPHRINE 0.3 MG/.3ML
0.3 INJECTION SUBCUTANEOUS ONCE AS NEEDED
Status: DISCONTINUED | OUTPATIENT
Start: 2025-08-28 | End: 2025-08-28 | Stop reason: HOSPADM

## 2025-08-28 RX ORDER — HEPARIN 100 UNIT/ML
500 SYRINGE INTRAVENOUS
Status: DISCONTINUED | OUTPATIENT
Start: 2025-08-28 | End: 2025-08-28 | Stop reason: HOSPADM

## 2025-08-28 RX ORDER — HEPARIN 100 UNIT/ML
500 SYRINGE INTRAVENOUS
Status: CANCELLED | OUTPATIENT
Start: 2025-08-28

## 2025-08-28 RX ORDER — SODIUM CHLORIDE 0.9 % (FLUSH) 0.9 %
10 SYRINGE (ML) INJECTION
Status: CANCELLED | OUTPATIENT
Start: 2025-08-28

## 2025-08-28 RX ORDER — SODIUM CHLORIDE 0.9 % (FLUSH) 0.9 %
10 SYRINGE (ML) INJECTION
Status: DISCONTINUED | OUTPATIENT
Start: 2025-08-28 | End: 2025-08-28 | Stop reason: HOSPADM

## 2025-09-02 ENCOUNTER — INFUSION (OUTPATIENT)
Dept: INFUSION THERAPY | Facility: HOSPITAL | Age: 45
End: 2025-09-02
Attending: INTERNAL MEDICINE
Payer: COMMERCIAL

## 2025-09-02 VITALS
RESPIRATION RATE: 17 BRPM | HEART RATE: 82 BPM | OXYGEN SATURATION: 98 % | WEIGHT: 289.13 LBS | DIASTOLIC BLOOD PRESSURE: 97 MMHG | TEMPERATURE: 98 F | SYSTOLIC BLOOD PRESSURE: 171 MMHG | HEIGHT: 63 IN | BODY MASS INDEX: 51.23 KG/M2

## 2025-09-02 DIAGNOSIS — K92.2 GASTROINTESTINAL HEMORRHAGE, UNSPECIFIED GASTROINTESTINAL HEMORRHAGE TYPE: ICD-10-CM

## 2025-09-02 DIAGNOSIS — D64.9 SYMPTOMATIC ANEMIA: ICD-10-CM

## 2025-09-02 DIAGNOSIS — D50.0 IRON DEFICIENCY ANEMIA DUE TO CHRONIC BLOOD LOSS: Primary | ICD-10-CM

## 2025-09-02 PROCEDURE — 96374 THER/PROPH/DIAG INJ IV PUSH: CPT

## 2025-09-02 PROCEDURE — A4216 STERILE WATER/SALINE, 10 ML: HCPCS | Performed by: INTERNAL MEDICINE

## 2025-09-02 PROCEDURE — 25000003 PHARM REV CODE 250: Performed by: INTERNAL MEDICINE

## 2025-09-02 PROCEDURE — 63600175 PHARM REV CODE 636 W HCPCS: Mod: JZ,TB | Performed by: INTERNAL MEDICINE

## 2025-09-02 RX ORDER — HEPARIN 100 UNIT/ML
500 SYRINGE INTRAVENOUS
Status: CANCELLED | OUTPATIENT
Start: 2025-09-02

## 2025-09-02 RX ORDER — EPINEPHRINE 0.3 MG/.3ML
0.3 INJECTION SUBCUTANEOUS ONCE AS NEEDED
Status: DISCONTINUED | OUTPATIENT
Start: 2025-09-02 | End: 2025-09-02

## 2025-09-02 RX ORDER — HEPARIN 100 UNIT/ML
500 SYRINGE INTRAVENOUS
Status: DISCONTINUED | OUTPATIENT
Start: 2025-09-02 | End: 2025-09-02

## 2025-09-02 RX ORDER — SODIUM CHLORIDE 0.9 % (FLUSH) 0.9 %
10 SYRINGE (ML) INJECTION
Status: CANCELLED | OUTPATIENT
Start: 2025-09-02

## 2025-09-02 RX ORDER — SODIUM CHLORIDE 0.9 % (FLUSH) 0.9 %
10 SYRINGE (ML) INJECTION
Status: DISCONTINUED | OUTPATIENT
Start: 2025-09-02 | End: 2025-09-02

## 2025-09-02 RX ORDER — EPINEPHRINE 0.3 MG/.3ML
0.3 INJECTION SUBCUTANEOUS ONCE AS NEEDED
Status: CANCELLED | OUTPATIENT
Start: 2025-09-02 | End: 2037-01-28

## 2025-09-02 RX ADMIN — Medication 10 ML: at 07:09

## 2025-09-02 RX ADMIN — Medication 10 ML: at 08:09

## 2025-09-02 RX ADMIN — FERRIC CARBOXYMALTOSE INJECTION 750 MG: 50 INJECTION, SOLUTION INTRAVENOUS at 07:09
